# Patient Record
Sex: FEMALE | Race: WHITE | NOT HISPANIC OR LATINO | Employment: UNEMPLOYED | URBAN - METROPOLITAN AREA
[De-identification: names, ages, dates, MRNs, and addresses within clinical notes are randomized per-mention and may not be internally consistent; named-entity substitution may affect disease eponyms.]

---

## 2017-01-10 ENCOUNTER — HOSPITAL ENCOUNTER (OUTPATIENT)
Dept: ULTRASOUND IMAGING | Facility: HOSPITAL | Age: 42
Discharge: HOME | End: 2017-01-10
Attending: UROLOGY
Payer: COMMERCIAL

## 2017-01-10 DIAGNOSIS — N13.30 HYDRONEPHROSIS: ICD-10-CM

## 2017-01-10 PROCEDURE — 76770 US EXAM ABDO BACK WALL COMP: CPT

## 2017-02-16 ENCOUNTER — ALLSCRIPTS OFFICE VISIT (OUTPATIENT)
Dept: OTHER | Facility: OTHER | Age: 42
End: 2017-02-16

## 2018-01-13 VITALS
SYSTOLIC BLOOD PRESSURE: 112 MMHG | HEART RATE: 68 BPM | DIASTOLIC BLOOD PRESSURE: 72 MMHG | HEIGHT: 64 IN | BODY MASS INDEX: 28.17 KG/M2 | WEIGHT: 165 LBS

## 2018-02-04 ENCOUNTER — APPOINTMENT (EMERGENCY)
Dept: RADIOLOGY | Facility: HOSPITAL | Age: 43
End: 2018-02-04
Payer: COMMERCIAL

## 2018-02-04 ENCOUNTER — APPOINTMENT (EMERGENCY)
Dept: CT IMAGING | Facility: HOSPITAL | Age: 43
End: 2018-02-04
Payer: COMMERCIAL

## 2018-02-04 ENCOUNTER — HOSPITAL ENCOUNTER (EMERGENCY)
Facility: HOSPITAL | Age: 43
Discharge: HOME | End: 2018-02-05
Attending: EMERGENCY MEDICINE | Admitting: EMERGENCY MEDICINE
Payer: COMMERCIAL

## 2018-02-04 DIAGNOSIS — R10.11 RUQ ABDOMINAL PAIN: Primary | ICD-10-CM

## 2018-02-04 DIAGNOSIS — R07.89 CHEST WALL PAIN: ICD-10-CM

## 2018-02-04 LAB
ALBUMIN SERPL BCP-MCNC: 3.5 G/DL (ref 3.5–5)
ALP SERPL-CCNC: 50 U/L (ref 46–116)
ALT SERPL W P-5'-P-CCNC: 21 U/L (ref 12–78)
ANION GAP SERPL CALCULATED.3IONS-SCNC: 9 MMOL/L (ref 4–13)
APTT PPP: 29 SECONDS (ref 23–35)
AST SERPL W P-5'-P-CCNC: 20 U/L (ref 5–45)
B-HCG SERPL-ACNC: <2 MIU/ML
BASOPHILS # BLD AUTO: 0.03 THOUSANDS/ΜL (ref 0–0.1)
BASOPHILS NFR BLD AUTO: 0 % (ref 0–1)
BILIRUB SERPL-MCNC: 0.1 MG/DL (ref 0.2–1)
BUN SERPL-MCNC: 9 MG/DL (ref 5–25)
CALCIUM SERPL-MCNC: 9.4 MG/DL (ref 8.3–10.1)
CHLORIDE SERPL-SCNC: 104 MMOL/L (ref 100–108)
CK SERPL-CCNC: 83 U/L (ref 26–192)
CO2 SERPL-SCNC: 28 MMOL/L (ref 21–32)
CREAT SERPL-MCNC: 0.84 MG/DL (ref 0.6–1.3)
DEPRECATED D DIMER PPP: <270 NG/ML (FEU) (ref 0–424)
EOSINOPHIL # BLD AUTO: 0.2 THOUSAND/ΜL (ref 0–0.61)
EOSINOPHIL NFR BLD AUTO: 2 % (ref 0–6)
ERYTHROCYTE [DISTWIDTH] IN BLOOD BY AUTOMATED COUNT: 14.7 % (ref 11.6–15.1)
GFR SERPL CREATININE-BSD FRML MDRD: 87 ML/MIN/1.73SQ M
GLUCOSE SERPL-MCNC: 103 MG/DL (ref 65–140)
HCT VFR BLD AUTO: 35.8 % (ref 34.8–46.1)
HGB BLD-MCNC: 11.7 G/DL (ref 11.5–15.4)
INR PPP: 0.92 (ref 0.86–1.16)
LACTATE SERPL-SCNC: 1.8 MMOL/L (ref 0.5–2)
LIPASE SERPL-CCNC: 214 U/L (ref 73–393)
LYMPHOCYTES # BLD AUTO: 2.71 THOUSANDS/ΜL (ref 0.6–4.47)
LYMPHOCYTES NFR BLD AUTO: 33 % (ref 14–44)
MCH RBC QN AUTO: 27.1 PG (ref 26.8–34.3)
MCHC RBC AUTO-ENTMCNC: 32.7 G/DL (ref 31.4–37.4)
MCV RBC AUTO: 83 FL (ref 82–98)
MONOCYTES # BLD AUTO: 0.94 THOUSAND/ΜL (ref 0.17–1.22)
MONOCYTES NFR BLD AUTO: 11 % (ref 4–12)
NEUTROPHILS # BLD AUTO: 4.38 THOUSANDS/ΜL (ref 1.85–7.62)
NEUTS SEG NFR BLD AUTO: 54 % (ref 43–75)
PLATELET # BLD AUTO: 275 THOUSANDS/UL (ref 149–390)
PMV BLD AUTO: 10.6 FL (ref 8.9–12.7)
POTASSIUM SERPL-SCNC: 4.1 MMOL/L (ref 3.5–5.3)
PROT SERPL-MCNC: 7.4 G/DL (ref 6.4–8.2)
PROTHROMBIN TIME: 12.6 SECONDS (ref 12.1–14.4)
RBC # BLD AUTO: 4.31 MILLION/UL (ref 3.81–5.12)
SODIUM SERPL-SCNC: 141 MMOL/L (ref 136–145)
TROPONIN I SERPL-MCNC: <0.02 NG/ML
WBC # BLD AUTO: 8.26 THOUSAND/UL (ref 4.31–10.16)

## 2018-02-04 PROCEDURE — 85610 PROTHROMBIN TIME: CPT | Performed by: EMERGENCY MEDICINE

## 2018-02-04 PROCEDURE — 71275 CT ANGIOGRAPHY CHEST: CPT

## 2018-02-04 PROCEDURE — 83605 ASSAY OF LACTIC ACID: CPT | Performed by: EMERGENCY MEDICINE

## 2018-02-04 PROCEDURE — 71046 X-RAY EXAM CHEST 2 VIEWS: CPT

## 2018-02-04 PROCEDURE — 83690 ASSAY OF LIPASE: CPT | Performed by: EMERGENCY MEDICINE

## 2018-02-04 PROCEDURE — 85025 COMPLETE CBC W/AUTO DIFF WBC: CPT | Performed by: EMERGENCY MEDICINE

## 2018-02-04 PROCEDURE — 96360 HYDRATION IV INFUSION INIT: CPT

## 2018-02-04 PROCEDURE — 84484 ASSAY OF TROPONIN QUANT: CPT | Performed by: EMERGENCY MEDICINE

## 2018-02-04 PROCEDURE — 80053 COMPREHEN METABOLIC PANEL: CPT | Performed by: EMERGENCY MEDICINE

## 2018-02-04 PROCEDURE — 96361 HYDRATE IV INFUSION ADD-ON: CPT

## 2018-02-04 PROCEDURE — 82550 ASSAY OF CK (CPK): CPT | Performed by: EMERGENCY MEDICINE

## 2018-02-04 PROCEDURE — 85379 FIBRIN DEGRADATION QUANT: CPT | Performed by: EMERGENCY MEDICINE

## 2018-02-04 PROCEDURE — 36415 COLL VENOUS BLD VENIPUNCTURE: CPT

## 2018-02-04 PROCEDURE — 93005 ELECTROCARDIOGRAM TRACING: CPT

## 2018-02-04 PROCEDURE — 85730 THROMBOPLASTIN TIME PARTIAL: CPT | Performed by: EMERGENCY MEDICINE

## 2018-02-04 PROCEDURE — 84702 CHORIONIC GONADOTROPIN TEST: CPT | Performed by: EMERGENCY MEDICINE

## 2018-02-04 PROCEDURE — 74177 CT ABD & PELVIS W/CONTRAST: CPT

## 2018-02-04 RX ORDER — SODIUM CHLORIDE 9 MG/ML
125 INJECTION, SOLUTION INTRAVENOUS CONTINUOUS
Status: DISCONTINUED | OUTPATIENT
Start: 2018-02-04 | End: 2018-02-05 | Stop reason: HOSPADM

## 2018-02-04 RX ADMIN — SODIUM CHLORIDE 1000 ML: 0.9 INJECTION, SOLUTION INTRAVENOUS at 22:59

## 2018-02-04 RX ADMIN — IOHEXOL 100 ML: 350 INJECTION, SOLUTION INTRAVENOUS at 22:58

## 2018-02-05 VITALS
HEART RATE: 76 BPM | BODY MASS INDEX: 29.1 KG/M2 | SYSTOLIC BLOOD PRESSURE: 123 MMHG | TEMPERATURE: 97.9 F | OXYGEN SATURATION: 100 % | DIASTOLIC BLOOD PRESSURE: 72 MMHG | RESPIRATION RATE: 16 BRPM | WEIGHT: 169.53 LBS

## 2018-02-05 LAB
ATRIAL RATE: 78 BPM
CLARITY, POC: CLEAR
COLOR, POC: CLEAR
EXT BILIRUBIN, UA: NEGATIVE
EXT BLOOD URINE: NEGATIVE
EXT GLUCOSE, UA: NEGATIVE
EXT KETONES: NEGATIVE
EXT NITRITE, UA: NEGATIVE
EXT PH, UA: 7.5
EXT PROTEIN, UA: NEGATIVE
EXT SPECIFIC GRAVITY, UA: 1
EXT UROBILINOGEN: 0.2
P AXIS: 50 DEGREES
PR INTERVAL: 136 MS
QRS AXIS: 53 DEGREES
QRSD INTERVAL: 78 MS
QT INTERVAL: 382 MS
QTC INTERVAL: 435 MS
T WAVE AXIS: 16 DEGREES
VENTRICULAR RATE: 78 BPM
WBC # BLD EST: NEGATIVE 10*3/UL

## 2018-02-05 PROCEDURE — 81002 URINALYSIS NONAUTO W/O SCOPE: CPT | Performed by: EMERGENCY MEDICINE

## 2018-02-05 PROCEDURE — 99284 EMERGENCY DEPT VISIT MOD MDM: CPT

## 2018-02-05 PROCEDURE — 93010 ELECTROCARDIOGRAM REPORT: CPT | Performed by: INTERNAL MEDICINE

## 2018-02-05 RX ORDER — OMEPRAZOLE 20 MG/1
20 CAPSULE, DELAYED RELEASE ORAL DAILY
Qty: 20 CAPSULE | Refills: 0 | Status: SHIPPED | OUTPATIENT
Start: 2018-02-05 | End: 2020-12-26 | Stop reason: ALTCHOICE

## 2018-02-05 RX ORDER — NAPROXEN SODIUM 550 MG/1
550 TABLET ORAL 2 TIMES DAILY WITH MEALS
Qty: 20 TABLET | Refills: 0 | Status: SHIPPED | OUTPATIENT
Start: 2018-02-05 | End: 2020-12-26 | Stop reason: ALTCHOICE

## 2018-02-05 NOTE — DISCHARGE INSTRUCTIONS
Abdominal Pain   WHAT YOU NEED TO KNOW:   Abdominal pain can be dull, achy, or sharp  You may have pain in one area of your abdomen, or in your entire abdomen  Your pain may be caused by a condition such as constipation, food sensitivity or poisoning, infection, or a blockage  Abdominal pain can also be from a hernia, appendicitis, or an ulcer  Liver, gallbladder, or kidney conditions can also cause abdominal pain  The cause of your abdominal pain may be unknown  DISCHARGE INSTRUCTIONS:   Return to the emergency department if:   · You have new chest pain or shortness of breath  · You have pulsing pain in your upper abdomen or lower back that suddenly becomes constant  · Your pain is in the right lower abdominal area and worsens with movement  · You have a fever over 100 4°F (38°C) or shaking chills  · You are vomiting and cannot keep food or liquids down  · Your pain does not improve or gets worse over the next 8 to 12 hours  · You see blood in your vomit or bowel movements, or they look black and tarry  · Your skin or the whites of your eyes turn yellow  · You are a woman and have a large amount of vaginal bleeding that is not your monthly period  Contact your healthcare provider if:   · You have pain in your lower back  · You are a man and have pain in your testicles  · You have pain when you urinate  · You have questions or concerns about your condition or care  Follow up with your healthcare provider within 24 hours or as directed:  Write down your questions so you remember to ask them during your visits  Medicines:   · Medicines  may be given to calm your stomach and prevent vomiting or to decrease pain  Ask how to take pain medicine safely  · Take your medicine as directed  Contact your healthcare provider if you think your medicine is not helping or if you have side effects  Tell him of her if you are allergic to any medicine   Keep a list of the medicines, vitamins, and herbs you take  Include the amounts, and when and why you take them  Bring the list or the pill bottles to follow-up visits  Carry your medicine list with you in case of an emergency  © 2017 [address]  Information is for End User's use only and may not be sold, redistributed or otherwise used for commercial purposes  All illustrations and images included in CareNotes® are the copyrighted property of [employer]  or [employer]  The above information is an  only  It is not intended as medical advice for individual conditions or treatments  Talk to your doctor, nurse or pharmacist before following any medical regimen to see if it is safe and effective for you  Chest Wall Pain   WHAT YOU NEED TO KNOW:   Chest wall pain may be caused by problems with the muscles, cartilage, or bones of the chest wall  Chest wall pain may also be caused by pain that spreads to your chest from another part of your body  The pain may be aching, severe, dull, or sharp  It may come and go, or it may be constant  The pain may be worse when you move in certain ways, breathe deeply, or cough  DISCHARGE INSTRUCTIONS:   Call 911 if:   · You have any of the following signs of a heart attack:      ¨ Squeezing, pressure, or pain in your chest that lasts longer than 5 minutes or returns    ¨ Discomfort or pain in your back, neck, jaw, stomach, or arm     ¨ Trouble breathing    ¨ Nausea or vomiting    ¨ Lightheadedness or a sudden cold sweat, especially with chest pain or trouble breathing    Return to the emergency department if:   · You have severe pain  Contact your healthcare provider if:   · You develop a rash  · You have other new symptoms  · Your pain does not improve, even with treatment  · You have questions or concerns about your condition or care  Medicines:   You may need any of the following:  · NSAIDs , such as ibuprofen, help decrease swelling, pain, and fever  This medicine is available with or without a doctor's order  NSAIDs can cause stomach bleeding or kidney problems in certain people  If you take blood thinner medicine, always ask your healthcare provider if NSAIDs are safe for you  Always read the medicine label and follow directions  · Acetaminophen  decreases pain  It is available without a doctor's order  Ask how much to take and how often to take it  Follow directions  Acetaminophen can cause liver damage if not taken correctly  · A cream  may be applied to your chest to decrease pain  · Take your medicine as directed  Contact your healthcare provider if you think your medicine is not helping or if you have side effects  Tell him of her if you are allergic to any medicine  Keep a list of the medicines, vitamins, and herbs you take  Include the amounts, and when and why you take them  Bring the list or the pill bottles to follow-up visits  Carry your medicine list with you in case of an emergency  Follow up with your healthcare provider as directed:  Write down your questions so you remember to ask them during your visits  Self-care:   · Rest  as needed  Avoid activities that make your chest wall pain worse  · Apply heat  on your chest for 20 to 30 minutes every 2 hours for as many days as directed  Heat helps decrease pain and muscle spasms  · Apply ice  on your chest for 15 to 20 minutes every hour or as directed  Use an ice pack, or put crushed ice in a plastic bag  Cover it with a towel  Ice helps prevent tissue damage and decreases swelling and pain  © 2017 [address] [employer]  Information is for End User's use only and may not be sold, redistributed or otherwise used for commercial purposes  All illustrations and images included in CareNotes® are the copyrighted property of [employer]  or [address]  The above information is an  only   It is not intended as medical advice for individual conditions or treatments  Talk to your doctor, nurse or pharmacist before following any medical regimen to see if it is safe and effective for you

## 2018-02-05 NOTE — ED PROVIDER NOTES
History  Chief Complaint   Patient presents with    Abdominal Pain     Pt presents to ED from home w/ abd pain URQ for two days  Pt (+) constipation, but had BM today  Pt (+) hx IBS  Patient is a 40year old female with 2 days of RUQ abdominal pain and interscapular back pain  (+) pleuritic pain  No sob  No cough  No fever  No N/V/D  No GI bleeding  No urinary sx  Has had prior TL and laparoscopic endometriosis surgery in the past  States she drove here but daughter can drive home  Declines pain medication  Had sinus infection 1 week ago  Was last seen in this ED on 12/8/16 for right flank pain  [facility] website checked on this patient and no Rx found  (+) constipation  History provided by:  Patient and relative (daughter)   used: No        Prior to Admission Medications   Prescriptions Last Dose Informant Patient Reported? Taking? CHLOROPHYLL PO   Yes No   Sig: Take 1 Dose by mouth daily   LYSINE PO   Yes No   Sig: Take 1 tablet by mouth daily   Spirulina 500 MG TABS   Yes No   Sig: Take 1 tablet by mouth daily   UNKNOWN TO PATIENT   Yes No   UNKNOWN TO PATIENT   Yes No   UNKNOWN TO PATIENT   Yes No   UNKNOWN TO PATIENT   Yes No   b complex vitamins tablet   Yes No   Sig: Take 1 tablet by mouth daily   ibuprofen (MOTRIN) 600 mg tablet   No No   Sig: Take 1 tablet by mouth every 8 (eight) hours as needed for mild pain or moderate pain (with food) for up to 7 days   multivitamin (THERAGRAN) TABS   Yes No   Sig: Take 1 tablet by mouth daily      Facility-Administered Medications: None       Past Medical History:   Diagnosis Date    Asthma     ACTIVITY INDUCED ASTHMA    IBS (irritable bowel syndrome)        Past Surgical History:   Procedure Laterality Date    ABDOMINAL SURGERY      LAPROSCOPY FOR ENDOMETRIOSIS    CERVICAL BIOPSY  W/ LOOP ELECTRODE EXCISION      FINGER SURGERY      LEFT MIDDLE FINGER SURGERY 5 YRS AGO    TUBAL LIGATION         History reviewed   No pertinent family history  I have reviewed and agree with the history as documented  Social History   Substance Use Topics    Smoking status: Former Smoker    Smokeless tobacco: Never Used      Comment: NO SMOKING IN 11 YEARS    Alcohol use No        Review of Systems   Constitutional: Negative for fever  Respiratory: Negative for cough and shortness of breath  Cardiovascular: Negative for chest pain  Gastrointestinal: Positive for abdominal pain and constipation  Negative for diarrhea, nausea and vomiting  Genitourinary: Negative for difficulty urinating  Musculoskeletal: Positive for back pain  All other systems reviewed and are negative  Physical Exam  ED Triage Vitals [02/04/18 2114]   Temperature Pulse Respirations Blood Pressure SpO2   97 9 °F (36 6 °C) 74 16 130/83 100 %      Temp Source Heart Rate Source Patient Position - Orthostatic VS BP Location FiO2 (%)   Oral Monitor Lying Right arm --      Pain Score       6           Orthostatic Vital Signs  Vitals:    02/04/18 2114   BP: 130/83   Pulse: 74   Patient Position - Orthostatic VS: Lying       Physical Exam   Constitutional: She is oriented to person, place, and time  She appears well-developed and well-nourished  She appears distressed (mild)  HENT:   Head: Normocephalic and atraumatic  Mouth/Throat: Oropharynx is clear and moist    Eyes: No scleral icterus  Neck: No tracheal deviation present  Cardiovascular: Normal rate, regular rhythm and normal heart sounds  No murmur heard  Pulmonary/Chest: Effort normal and breath sounds normal  No stridor  No respiratory distress  She has no wheezes  She has no rales  She exhibits tenderness (R lower lateral chest wall tenderness)  Abdominal: Soft  Bowel sounds are normal  She exhibits no distension  There is tenderness (RUQ)  There is no rebound and no guarding  Musculoskeletal: She exhibits no edema, tenderness (No back tenderness) or deformity     Neurological: She is alert and oriented to person, place, and time  Skin: Skin is warm and dry  No rash noted  Psychiatric: She has a normal mood and affect  Nursing note and vitals reviewed  ED Medications  Medications   sodium chloride 0 9 % infusion (not administered)   sodium chloride 0 9 % bolus 1,000 mL (1,000 mL Intravenous New Bag 2/4/18 2259)   iohexol (OMNIPAQUE) 350 MG/ML injection (MULTI-DOSE) 100 mL (100 mL Intravenous Given 2/4/18 2258)       Diagnostic Studies  Results Reviewed     Procedure Component Value Units Date/Time    POCT urinalysis dipstick [[id number]]  (Normal) Resulted:  02/05/18 0012    Lab Status:  Final result Specimen:  Urine Updated:  02/05/18 0012     Color, UA clear     Clarity, UA clear     EXT Glucose, UA negative     EXT Bilirubin, UA (Ref: Negative) negative     EXT Ketones, UA (Ref: Negative) negative     EXT Spec Grav, UA 1 005     EXT Blood, UA (Ref: Negative) negative     EXT pH, UA 7 5     EXT Protein, UA (Ref: Negative) negative     EXT Urobilinogen, UA (Ref: 0 2- 1 0) 0 2     EXT Leukocytes, UA (Ref: Negative) negative     EXT Nitrite, UA (Ref: Negative) negative    Lactic acid, plasma [[id number]]  (Normal) Collected:  02/04/18 2258    Lab Status:  Final result Specimen:  Blood from Arm, Right Updated:  02/04/18 2327     LACTIC ACID 1 8 mmol/L     Narrative:         Result may be elevated if tourniquet was used during collection  Troponin I [[id number]]  (Normal) Collected:  02/04/18 2258    Lab Status:  Final result Specimen:  Blood from Arm, Right Updated:  02/04/18 2326     Troponin I <0 02 ng/mL     Narrative:         [employer] Chemistry analyzer 99% cutoff is > 0 04 ng/mL in network labs    o cTnI 99% cutoff is useful only when applied to patients in the clinical setting of myocardial ischemia  o cTnI 99% cutoff should be interpreted in the context of clinical history, ECG findings and possibly cardiac imaging to establish correct diagnosis    o cTnI 99% cutoff may be suggestive but clearly not indicative of a coronary event without the clinical setting of myocardial ischemia  D-Dimer [[id number]]  (Normal) Collected:  02/04/18 2258    Lab Status:  Final result Specimen:  Blood from Arm, Right Updated:  02/04/18 2323     D-Dimer, Quant <270 ng/ml (FEU)     Protime-INR [[id number]]  (Normal) Collected:  02/04/18 2258    Lab Status:  Final result Specimen:  Blood from Arm, Right Updated:  02/04/18 2319     Protime 12 6 seconds      INR 0 92    APTT [[id number]]  (Normal) Collected:  02/04/18 2258    Lab Status:  Final result Specimen:  Blood from Arm, Right Updated:  02/04/18 2319     PTT 29 seconds     Narrative:          Therapeutic Heparin Range = 60-90 seconds    Quantitative hCG [[id number]]  (Normal) Collected:  02/04/18 2156    Lab Status:  Final result Specimen:  Blood from Arm, Right Updated:  02/04/18 2231     HCG, Quant <2 mIU/mL     Narrative:          Expected Ranges:     Approximate               Approximate HCG  Gestation age          Concentration ( mIU/mL)  _____________          ______________________   Reeda Ponto                      HCG values  0 2-1                       5-50  1-2                           2-3                         100-5000  3-4                         500-71929  4-5                         1000-61636  5-6                         [id number]  6-8                         [id number]  8-12                        [id number]    Lipase [[id number]]  (Normal) Collected:  02/04/18 2156    Lab Status:  Final result Specimen:  Blood from Arm, Right Updated:  02/04/18 2231     Lipase 214 u/L     CK Total with Reflex CKMB [[id number]]  (Normal) Collected:  02/04/18 2156    Lab Status:  Final result Specimen:  Blood from Arm, Right Updated:  02/04/18 2231     Total CK 83 U/L     Comprehensive metabolic panel [[id number]]  (Abnormal) Collected:  02/04/18 2156    Lab Status:  Final result Specimen:  Blood from Arm, Right Updated:  02/04/18 2223     Sodium 141 mmol/L Potassium 4 1 mmol/L      Chloride 104 mmol/L      CO2 28 mmol/L      Anion Gap 9 mmol/L      BUN 9 mg/dL      Creatinine 0 84 mg/dL      Glucose 103 mg/dL      Calcium 9 4 mg/dL      AST 20 U/L      ALT 21 U/L      Alkaline Phosphatase 50 U/L      Total Protein 7 4 g/dL      Albumin 3 5 g/dL      Total Bilirubin 0 10 (L) mg/dL      eGFR 87 ml/min/1 73sq m     Narrative:         National Kidney Disease Education Program recommendations are as follows:  GFR calculation is accurate only with a steady state creatinine  Chronic Kidney disease less than 60 ml/min/1 73 sq  meters  Kidney failure less than 15 ml/min/1 73 sq  meters  CBC and differential [[id number]]  (Normal) Collected:  02/04/18 2156    Lab Status:  Final result Specimen:  Blood from Arm, Right Updated:  02/04/18 2209     WBC 8 26 Thousand/uL      RBC 4 31 Million/uL      Hemoglobin 11 7 g/dL      Hematocrit 35 8 %      MCV 83 fL      MCH 27 1 pg      MCHC 32 7 g/dL      RDW 14 7 %      MPV 10 6 fL      Platelets 994 Thousands/uL      Neutrophils Relative 54 %      Lymphocytes Relative 33 %      Monocytes Relative 11 %      Eosinophils Relative 2 %      Basophils Relative 0 %      Neutrophils Absolute 4 38 Thousands/µL      Lymphocytes Absolute 2 71 Thousands/µL      Monocytes Absolute 0 94 Thousand/µL      Eosinophils Absolute 0 20 Thousand/µL      Basophils Absolute 0 03 Thousands/µL                  PE Study with CT Abdomen and Pelvis with contrast   ED Interpretation by Julieta Montesinos MD (02/05 0005)   COMPARISON:   No relevant prior studies available  FINDINGS:   Pulmonary arteries: No filling defects identified within the pulmonary arteries to suggest pulmonary   embolism  Aorta: No acute findings  No thoracic aortic aneurysm  Lungs: No acute findings visualized  No mass  No consolidative infiltrate  Pleural space: No significant effusion  No pneumothorax  Heart: No significant cardiomegaly  No pericardial effusion     Bones/joints: No acute fracture  No dislocation  Soft tissues: Unremarkable  Lymph nodes: No significant lymph node enlargement  IMPRESSION:   No evidence of pulmonary embolism or other acute findings in the chest    COMPARISON:   CT RENAL STONE STUDY ABD-PELVIS WO CONTRAST 2016-12-08 16:00   FINDINGS:   Lower thorax: No acute findings  ABDOMEN:   Liver: Unremarkable  No obvious mass  Gallbladder and bile ducts: The gallbladder is contracted  No calcified gallstones visualized  No   significant biliary ductal dilatation  Pancreas: Unremarkable  No ductal dilation  No obvious mass  Spleen: Unremarkable  No splenomegaly  Adrenals: Unremarkable  No adrenal nodules or masses identified  Kidneys and ureters: Unremarkable  No solid mass  No hydronephrosis  Stomach and bowel: No evidence of bowel obstruction  No significant bowel wall thickening   appreciated  Appendix: The appendix is normal in caliber  No evidence of appendicitis  PELVIS:   Bladder: Unremarkable  No obvious mass  Reproductive: 1 2 cm left ovarian cyst or follicle  ABDOMEN and PELVIS:   Intraperitoneal space: Unremarkable  No free air  No significant fluid collection  Bones/joints: Grade I spondylolisthesis at L5/S1  No acute fractures  No dislocation  Soft tissues: No significant abnormalities in the superficial soft tissues  Vasculature: Unremarkable  No abdominal aortic aneurysm  Lymph nodes: No significant lymph node enlargement  IMPRESSION:   No acute findings visualized in the abdomen or pelvis  Thank you for allowing us to participate in the care of your patient  Dictated and Authenticated by: Gale Verdugo MD   02/04/2018 11:24 PM [place] Time ([id number])      XR chest 2 views   ED Interpretation by Ozzie Herrera MD (02/04 0689)   No acute disease read by me                    Procedures  ECG 12 Lead Documentation  Date/Time: 2/4/2018 11:14 PM  Performed by: Ava Ott  Authorized by: Ava Ott Indications / Diagnosis:  Upper back pain, abdominal pain  ECG reviewed by me, the ED Provider: yes    Patient location:  ED  Previous ECG:     Previous ECG:  Compared to current    Comparison ECG info:  4/25/12    Similarity:  No change  Quality:     Tracing quality:  Limited by artifact  Rate:     ECG rate:  78    ECG rate assessment: normal    Rhythm:     Rhythm: sinus rhythm    Ectopy:     Ectopy: none    QRS:     QRS axis:  Normal  Conduction:     Conduction: normal    ST segments:     ST segments:  Normal  T waves:     T waves: non-specific             Phone Contacts  ED Phone Contact    ED Course  ED Course as of Feb 05 0019   Mon Feb 05, 2018   0013 No acute abdomen prior to discharge  MDM  Number of Diagnoses or Management Options  Diagnosis management comments: DDx including but not limited to: appendicitis, gastroenteritis, gastritis, PUD, GERD, gastroparesis, hepatitis, pancreatitis, colitis, enteritis, diverticulitis, food poisoning, mesenteric adenitis, mesenteric ischemia, IBD, IBS, ileus, bowel obstruction, volvulus, internal hernia, AAA, cholecystitis, biliary colic, choledocholithiasis, pelvic pathology, renal colic, pyelonephritis, UTI, PE, PTX, pneumonia; doubt dissection or ACS or MI          Amount and/or Complexity of Data Reviewed  Clinical lab tests: ordered and reviewed  Tests in the radiology section of CPT®: ordered and reviewed  Decide to obtain previous medical records or to obtain history from someone other than the patient: yes  Review and summarize past medical records: yes  Independent visualization of images, tracings, or specimens: yes      CritCare Time    Disposition  Final diagnoses:   RUQ abdominal pain   Chest wall pain     Time reflects when diagnosis was documented in both MDM as applicable and the Disposition within this note     Time User Action Codes Description Comment    2/5/2018 12:18 AM Leatha Rogel Add [R10 11] RUQ abdominal pain     2/5/2018 12:18 AM Doris Houser [R07 89] Chest wall pain       ED Disposition     ED Disposition Condition Comment    Discharge  [facility] discharge to home/self care  Condition at discharge: Stable        Follow-up Information     Follow up With Specialties Details Why Contact Info    info link  Call in 2 days Return sooner if increased pain, fever, vomiting, diarrhea, difficulty breathing, weakness, difficulty urinating  [phone]          Patient's Medications   Discharge Prescriptions    NAPROXEN SODIUM (ANAPROX) 550 MG TABLET    Take 1 tablet (550 mg total) by mouth 2 (two) times a day with meals for 10 days       Start Date: 2/5/2018  End Date: 2/15/2018       Order Dose: 550 mg       Quantity: 20 tablet    Refills: 0    OMEPRAZOLE (PRILOSEC) 20 MG DELAYED RELEASE CAPSULE    Take 1 capsule (20 mg total) by mouth daily for 20 days       Start Date: 2/5/2018  End Date: 2/25/2018       Order Dose: 20 mg       Quantity: 20 capsule    Refills: 0     No discharge procedures on file      ED Provider  Electronically Signed by           Otilio Moscoso MD  02/05/18 44

## 2018-02-12 ENCOUNTER — TELEPHONE (OUTPATIENT)
Dept: UROLOGY | Facility: AMBULATORY SURGERY CENTER | Age: 43
End: 2018-02-12

## 2018-02-12 NOTE — TELEPHONE ENCOUNTER
Pt called stating she had been in ER recently and has already scheduled f/u with Urology  She wanted to know if any testing would be needed based on ER visit  LMOM stating nothing else would be required for f/u  Pt was seen at ED for RUQ abdominal pain

## 2018-02-16 DIAGNOSIS — N13.30 HYDRONEPHROSIS: ICD-10-CM

## 2018-03-05 ENCOUNTER — HOSPITAL ENCOUNTER (EMERGENCY)
Facility: HOSPITAL | Age: 43
Discharge: HOME | End: 2018-03-05
Attending: EMERGENCY MEDICINE
Payer: COMMERCIAL

## 2018-03-05 VITALS
TEMPERATURE: 98.1 F | RESPIRATION RATE: 20 BRPM | BODY MASS INDEX: 27.46 KG/M2 | SYSTOLIC BLOOD PRESSURE: 145 MMHG | WEIGHT: 160 LBS | HEART RATE: 84 BPM | OXYGEN SATURATION: 100 % | DIASTOLIC BLOOD PRESSURE: 63 MMHG

## 2018-03-05 DIAGNOSIS — J40 BRONCHITIS: Primary | ICD-10-CM

## 2018-03-05 PROCEDURE — 99284 EMERGENCY DEPT VISIT MOD MDM: CPT

## 2018-03-05 RX ORDER — ALBUTEROL SULFATE 90 UG/1
2 AEROSOL, METERED RESPIRATORY (INHALATION) ONCE
Status: COMPLETED | OUTPATIENT
Start: 2018-03-05 | End: 2018-03-05

## 2018-03-05 RX ORDER — AZITHROMYCIN 200 MG/5ML
POWDER, FOR SUSPENSION ORAL
Qty: 36.5 ML | Refills: 0 | Status: SHIPPED | OUTPATIENT
Start: 2018-03-05 | End: 2020-12-30 | Stop reason: HOSPADM

## 2018-03-05 RX ORDER — AZITHROMYCIN 250 MG/1
500 TABLET, FILM COATED ORAL ONCE
Status: DISCONTINUED | OUTPATIENT
Start: 2018-03-05 | End: 2018-03-05

## 2018-03-05 RX ORDER — AZITHROMYCIN 200 MG/5ML
500 POWDER, FOR SUSPENSION ORAL ONCE
Status: COMPLETED | OUTPATIENT
Start: 2018-03-05 | End: 2018-03-05

## 2018-03-05 RX ORDER — PREDNISONE 20 MG/1
40 TABLET ORAL DAILY
Qty: 8 TABLET | Refills: 0 | Status: SHIPPED | OUTPATIENT
Start: 2018-03-05 | End: 2018-03-09

## 2018-03-05 RX ORDER — PREDNISONE 20 MG/1
60 TABLET ORAL ONCE
Status: COMPLETED | OUTPATIENT
Start: 2018-03-05 | End: 2018-03-05

## 2018-03-05 RX ADMIN — ALBUTEROL SULFATE 2 PUFF: 90 AEROSOL, METERED RESPIRATORY (INHALATION) at 21:26

## 2018-03-05 RX ADMIN — AZITHROMYCIN 500 MG: 1200 POWDER, FOR SUSPENSION ORAL at 21:26

## 2018-03-05 RX ADMIN — PREDNISONE 60 MG: 20 TABLET ORAL at 21:25

## 2018-03-06 NOTE — DISCHARGE INSTRUCTIONS
Acute Bronchitis   WHAT YOU NEED TO KNOW:   Acute bronchitis is swelling and irritation in the air passages of your lungs  This irritation may cause you to cough or have other breathing problems  Acute bronchitis often starts because of another illness, such as a cold or the flu  The illness spreads from your nose and throat to your windpipe and airways  Bronchitis is often called a chest cold  Acute bronchitis lasts about 3 to 6 weeks and is usually not a serious illness  Your cough can last for several weeks  DISCHARGE INSTRUCTIONS:   Return to the emergency department if:   · You cough up blood  · Your lips or fingernails turn blue  · You feel like you are not getting enough air when you breathe  Contact your healthcare provider if:   · You have a fever  · Your breathing problems do not go away or get worse  · Your cough does not get better within 4 weeks  · You have questions or concerns about your condition or care  Self-care:   · Get more rest   Rest helps your body to heal  Slowly start to do more each day  Rest when you feel it is needed  · Avoid irritants in the air  Avoid chemicals, fumes, and dust  Wear a face mask if you must work around dust or fumes  Stay inside on days when air pollution levels are high  If you have allergies, stay inside when pollen counts are high  Do not use aerosol products, such as spray-on deodorant, bug spray, and hair spray  · Do not smoke or be around others who smoke  Nicotine and other chemicals in cigarettes and cigars damages the cilia that move mucus out of your lungs  Ask your healthcare provider for information if you currently smoke and need help to quit  E-cigarettes or smokeless tobacco still contain nicotine  Talk to your healthcare provider before you use these products  · Drink liquids as directed  Liquids help keep your air passages moist and help you cough up mucus   You may need to drink more liquids when you have acute bronchitis  Ask how much liquid to drink each day and which liquids are best for you  · Use a humidifier or vaporizer  Use a cool mist humidifier or a vaporizer to increase air moisture in your home  This may make it easier for you to breathe and help decrease your cough  Decrease risk for acute bronchitis:   · Get the vaccinations you need  Ask your healthcare provider if you should get vaccinated against the flu or pneumonia  · Prevent the spread of germs  You can decrease your risk of acute bronchitis and other illnesses by doing the following:     [facility] your hands often with soap and water  Carry germ-killing hand lotion or gel with you  You can use the lotion or gel to clean your hands when soap and water are not available  ¨ Do not touch your eyes, nose, or mouth unless you have washed your hands first     ¨ Always cover your mouth when you cough to prevent the spread of germs  It is best to cough into a tissue or your shirt sleeve instead of into your hand  Ask those around you cover their mouths when they cough  ¨ Try to avoid people who have a cold or the flu  If you are sick, stay away from others as much as possible  Medicines: Your healthcare provider may  give you any of the following:  · Ibuprofen or acetaminophen  are medicines that help lower your fever  They are available without a doctor's order  Ask your healthcare provider which medicine is right for you  Ask how much to take and how often to take it  Follow directions  These medicines can cause stomach bleeding if not taken correctly  Ibuprofen can cause kidney damage  Do not take ibuprofen if you have kidney disease, an ulcer, or allergies to aspirin  Acetaminophen can cause liver damage  Do not take more than 4,000 milligrams in 24 hours  · Decongestants  help loosen mucus in your lungs and make it easier to cough up  This can help you breathe easier  · Cough suppressants  decrease your urge to cough   If your cough produces mucus, do not take a cough suppressant unless your healthcare provider tells you to  Your healthcare provider may suggest that you take a cough suppressant at night so you can rest     · Inhalers  may be given  Your healthcare provider may give you one or more inhalers to help you breathe easier and cough less  An inhaler gives your medicine to open your airways  Ask your healthcare provider to show you how to use your inhaler correctly  · Take your medicine as directed  Contact your healthcare provider if you think your medicine is not helping or if you have side effects  Tell him of her if you are allergic to any medicine  Keep a list of the medicines, vitamins, and herbs you take  Include the amounts, and when and why you take them  Bring the list or the pill bottles to follow-up visits  Carry your medicine list with you in case of an emergency  Follow up with your healthcare provider as directed:  Write down questions you have so you will remember to ask them during your follow-up visits  © 2017 [employer] Information is for End User's use only and may not be sold, redistributed or otherwise used for commercial purposes  All illustrations and images included in CareNotes® are the copyrighted property of [employer]  or [employer]  The above information is an  only  It is not intended as medical advice for individual conditions or treatments  Talk to your doctor, nurse or pharmacist before following any medical regimen to see if it is safe and effective for you  Dysphagia   WHAT YOU NEED TO KNOW:   Dysphagia is trouble swallowing  It occurs when you have trouble moving food or liquid from your mouth to your esophagus or down to your stomach  It may occur when you eat, drink, or any time you try to swallow  DISCHARGE INSTRUCTIONS:   Return to the emergency department if:   · You choke on your own saliva  · You have chest pain  · You have shortness of breath  · You cannot eat or drink liquids at all  Contact your healthcare provider if:   · You lose weight without trying  · Your signs and symptoms get worse, or you have new signs or symptoms  · You have signs or symptoms of dehydration, such as increased thirst, dark yellow urine, or little or no urine  · You get colds often  · You have questions or concerns about your condition or care  Nutrition:  You may need to change the texture of the foods you eat to help reduce choking problems  Your healthcare provider may show you how to thicken liquids or soften foods to make them easier to swallow  Follow up with your healthcare provider as directed:  Write down your questions so you remember to ask them during your visits  © 2017 [facility] Information is for End User's use only and may not be sold, redistributed or otherwise used for commercial purposes  All illustrations and images included in CareNotes® are the copyrighted property of A [employer]  or [employer]  The above information is an  only  It is not intended as medical advice for individual conditions or treatments  Talk to your doctor, nurse or pharmacist before following any medical regimen to see if it is safe and effective for you

## 2018-03-06 NOTE — ED PROVIDER NOTES
History  Chief Complaint   Patient presents with    Shortness of Breath     pt reports starting with chest tightness yesterday, pt states she is unable to take a deep breathe + cough and some chest congestion with sore throat, herbal remedies taken at home with no relief, daughter with flu 2 weeks ago     78-year-old female presents to the emergency department with complaints of upper respiratory symptoms  States she has had a cough with sore throat and difficulty taking a deep breath over the past 2-3 days  States she has had a small amount of mucus production with cough  Denies shortness of breath at rest   States that she has been taking over-the-counter medications without relief of symptoms  Also requesting with medication  Patient states that she choked on a pepper several weeks ago and has had difficulty swallowing pills  States that she has been modifying her diet and that she is not having issues swallowing food  She denies pain in her chest or pain with swallowing  States she had a similar instance several years ago and had endoscopy done showing small tear in her soften his which healed without difficulty  Will give follow-up with Gastroenterology          History provided by:  Patient   used: No    Shortness of Breath   Severity:  Mild  Onset quality:  Gradual  Duration:  3 days  Timing:  Constant  Progression:  Waxing and waning  Chronicity:  New  Context: URI    Context: not activity, not animal exposure, not emotional upset, not fumes, not known allergens, not occupational exposure, not pollens, not smoke exposure, not strong odors and not weather changes    Relieved by:  Nothing  Associated symptoms: cough and sore throat    Associated symptoms: no abdominal pain, no chest pain, no claudication, no diaphoresis, no ear pain, no fever, no headaches, no hemoptysis, no neck pain, no PND, no rash, no sputum production, no syncope, no swollen glands, no vomiting and no wheezing        Prior to Admission Medications   Prescriptions Last Dose Informant Patient Reported? Taking? CHLOROPHYLL PO   Yes No   Sig: Take 1 Dose by mouth daily   LYSINE PO   Yes No   Sig: Take 1 tablet by mouth daily   Spirulina 500 MG TABS   Yes No   Sig: Take 1 tablet by mouth daily   UNKNOWN TO PATIENT   Yes No   UNKNOWN TO PATIENT   Yes No   UNKNOWN TO PATIENT   Yes No   UNKNOWN TO PATIENT   Yes No   b complex vitamins tablet   Yes No   Sig: Take 1 tablet by mouth daily   ibuprofen (MOTRIN) 600 mg tablet   No No   Sig: Take 1 tablet by mouth every 8 (eight) hours as needed for mild pain or moderate pain (with food) for up to 7 days   multivitamin (THERAGRAN) TABS   Yes No   Sig: Take 1 tablet by mouth daily   naproxen sodium (ANAPROX) 550 mg tablet   No No   Sig: Take 1 tablet (550 mg total) by mouth 2 (two) times a day with meals for 10 days   omeprazole (PriLOSEC) 20 mg delayed release capsule   No No   Sig: Take 1 capsule (20 mg total) by mouth daily for 20 days      Facility-Administered Medications: None       Past Medical History:   Diagnosis Date    Asthma     ACTIVITY INDUCED ASTHMA    IBS (irritable bowel syndrome)        Past Surgical History:   Procedure Laterality Date    ABDOMINAL SURGERY      LAPROSCOPY FOR ENDOMETRIOSIS    CERVICAL BIOPSY  W/ LOOP ELECTRODE EXCISION      FINGER SURGERY      LEFT MIDDLE FINGER SURGERY 5 YRS AGO    TUBAL LIGATION         History reviewed  No pertinent family history  I have reviewed and agree with the history as documented  Social History   Substance Use Topics    Smoking status: Former Smoker    Smokeless tobacco: Never Used      Comment: NO SMOKING IN 11 YEARS    Alcohol use No        Review of Systems   Constitutional: Negative for activity change, chills, diaphoresis and fever  HENT: Positive for sore throat  Negative for dental problem, drooling, ear pain, mouth sores, sinus pressure, sneezing, tinnitus and trouble swallowing      Eyes: Negative for pain, discharge and itching  Respiratory: Positive for cough and shortness of breath  Negative for hemoptysis, sputum production, chest tightness and wheezing  Cardiovascular: Negative for chest pain, claudication, syncope and PND  Gastrointestinal: Negative for abdominal pain and vomiting  Musculoskeletal: Negative for neck pain  Skin: Negative for rash and wound  Neurological: Negative for dizziness, light-headedness and headaches  All other systems reviewed and are negative  Physical Exam  ED Triage Vitals [03/05/18 2028]   Temperature Pulse Respirations Blood Pressure SpO2   98 1 °F (36 7 °C) 84 20 145/63 100 %      Temp Source Heart Rate Source Patient Position - Orthostatic VS BP Location FiO2 (%)   Oral Monitor Sitting Left arm --      Pain Score       2           Orthostatic Vital Signs  Vitals:    03/05/18 2028   BP: 145/63   Pulse: 84   Patient Position - Orthostatic VS: Sitting       Physical Exam   Constitutional: She is oriented to person, place, and time  Vital signs are normal  She appears well-developed and well-nourished  No distress  HENT:   Head: Normocephalic and atraumatic  Right Ear: Hearing, tympanic membrane, external ear and ear canal normal    Left Ear: Hearing, tympanic membrane, external ear and ear canal normal    Nose: Nose normal    Mouth/Throat: Uvula is midline and oropharynx is clear and moist  No oropharyngeal exudate  Eyes: Conjunctivae, EOM and lids are normal    Cardiovascular: Normal rate and regular rhythm  Exam reveals no gallop and no friction rub  No murmur heard  Pulmonary/Chest: Effort normal and breath sounds normal  No respiratory distress  She has no wheezes  She has no rhonchi  She has no rales  She exhibits no tenderness  Barky breath sounds with cough  Musculoskeletal: Normal range of motion  Neurological: She is alert and oriented to person, place, and time  Skin: She is not diaphoretic     Psychiatric: She has a normal mood and affect  Her behavior is normal    Vitals reviewed  ED Medications  Medications   predniSONE tablet 60 mg (60 mg Oral Given 3/5/18 2125)   albuterol (PROVENTIL HFA,VENTOLIN HFA) inhaler 2 puff (2 puffs Inhalation Given 3/5/18 2126)   azithromycin (ZITHROMAX) oral suspension 500 mg (500 mg Oral Given 3/5/18 2126)       Diagnostic Studies  Results Reviewed     None                 No orders to display              Procedures  Procedures       Phone Contacts  ED Phone Contact    ED Course  ED Course                                MDM  Number of Diagnoses or Management Options  Bronchitis:   Diagnosis management comments: Differential diagnosis includes but not limited to:  Upper respiratory infection, bronchitis, esophageal stricture  CritCare Time    Disposition  Final diagnoses:   Bronchitis     Time reflects when diagnosis was documented in both MDM as applicable and the Disposition within this note     Time User Action Codes Description Comment    3/5/2018  9:22 PM Rama Snyder Add [J40] Bronchitis       ED Disposition     ED Disposition Condition Comment    Discharge  [facility] discharge to home/self care  Condition at discharge: Stable        Follow-up Information     Follow up With Specialties Details Why [id number] [facility] Internal Medicine Schedule an appointment as soon as possible for a visit  [address] [place] [address] [facility] Gastroenterology Schedule an appointment as soon as possible for a visit  Anil Cardenashannahrosalio  [address]   [phone]        Discharge Medication List as of 3/5/2018  9:25 PM      START taking these medications    Details   azithromycin (ZITHROMAX) 200 mg/5 mL suspension 364 mg (9 1 ml) by mouth daily for 4 days  , Print      predniSONE 20 mg tablet Take 2 tablets (40 mg total) by mouth daily for 4 days, Starting Mon 3/5/2018, Until Fri 3/9/2018, Print         CONTINUE these medications which have NOT CHANGED    Details   b complex vitamins tablet Take 1 tablet by mouth daily, Until Discontinued, Historical Med      CHLOROPHYLL PO Take 1 Dose by mouth daily, Until Discontinued, Historical Med      ibuprofen (MOTRIN) 600 mg tablet Take 1 tablet by mouth every 8 (eight) hours as needed for mild pain or moderate pain (with food) for up to 7 days, Starting 12/8/2016, Until u 12/15/16, Print      LYSINE PO Take 1 tablet by mouth daily, Until Discontinued, Historical Med      multivitamin (THERAGRAN) TABS Take 1 tablet by mouth daily, Until Discontinued, Historical Med      naproxen sodium (ANAPROX) 550 mg tablet Take 1 tablet (550 mg total) by mouth 2 (two) times a day with meals for 10 days, Starting Mon 2/5/2018, Until Thu 2/15/2018, Print      omeprazole (PriLOSEC) 20 mg delayed release capsule Take 1 capsule (20 mg total) by mouth daily for 20 days, Starting Mon 2/5/2018, Until Sun 2/25/2018, Print      Spirulina 500 MG TABS Take 1 tablet by mouth daily, Until Discontinued, Historical Med      !! UNKNOWN TO PATIENT Until Discontinued, Historical Med      !! UNKNOWN TO PATIENT Until Discontinued, Historical Med      !! UNKNOWN TO PATIENT Until Discontinued, Historical Med      !! UNKNOWN TO PATIENT Until Discontinued, Historical Med       !! - Potential duplicate medications found  Please discuss with provider  No discharge procedures on file      ED Provider  Electronically Signed by           Jimenez Paredes PA-C  03/06/18 0550

## 2018-03-09 ENCOUNTER — LAB REQUISITION (OUTPATIENT)
Dept: LAB | Facility: HOSPITAL | Age: 43
End: 2018-03-09
Payer: COMMERCIAL

## 2018-03-09 DIAGNOSIS — Z00.00 ENCOUNTER FOR GENERAL ADULT MEDICAL EXAMINATION WITHOUT ABNORMAL FINDINGS: ICD-10-CM

## 2018-03-09 PROCEDURE — 86803 HEPATITIS C AB TEST: CPT | Performed by: PREVENTIVE MEDICINE

## 2018-03-11 LAB — HCV AB SER QL: NORMAL

## 2018-04-21 ENCOUNTER — APPOINTMENT (EMERGENCY)
Dept: RADIOLOGY | Facility: HOSPITAL | Age: 43
End: 2018-04-21
Payer: COMMERCIAL

## 2018-04-21 ENCOUNTER — HOSPITAL ENCOUNTER (EMERGENCY)
Facility: HOSPITAL | Age: 43
Discharge: HOME | End: 2018-04-21
Attending: EMERGENCY MEDICINE | Admitting: EMERGENCY MEDICINE
Payer: COMMERCIAL

## 2018-04-21 VITALS
WEIGHT: 164.46 LBS | RESPIRATION RATE: 20 BRPM | HEART RATE: 65 BPM | BODY MASS INDEX: 28.23 KG/M2 | OXYGEN SATURATION: 99 % | DIASTOLIC BLOOD PRESSURE: 72 MMHG | SYSTOLIC BLOOD PRESSURE: 125 MMHG | TEMPERATURE: 98.1 F

## 2018-04-21 DIAGNOSIS — K22.89 ESOPHAGEAL PAIN: Primary | ICD-10-CM

## 2018-04-21 PROCEDURE — 99284 EMERGENCY DEPT VISIT MOD MDM: CPT

## 2018-04-21 PROCEDURE — 71046 X-RAY EXAM CHEST 2 VIEWS: CPT

## 2018-04-21 RX ORDER — SUCRALFATE ORAL 1 G/10ML
1 SUSPENSION ORAL DAILY
COMMUNITY
End: 2021-01-18

## 2018-04-21 RX ORDER — MAGNESIUM HYDROXIDE/ALUMINUM HYDROXICE/SIMETHICONE 120; 1200; 1200 MG/30ML; MG/30ML; MG/30ML
20 SUSPENSION ORAL ONCE
Status: COMPLETED | OUTPATIENT
Start: 2018-04-21 | End: 2018-04-21

## 2018-04-21 RX ADMIN — LIDOCAINE HYDROCHLORIDE 15 ML: 20 SOLUTION ORAL; TOPICAL at 01:54

## 2018-04-21 RX ADMIN — ALUMINUM HYDROXIDE, MAGNESIUM HYDROXIDE, AND SIMETHICONE 20 ML: 200; 200; 20 SUSPENSION ORAL at 01:55

## 2018-04-21 NOTE — ED PROVIDER NOTES
History  Chief Complaint   Patient presents with    Breathing Difficulty     per pt  "she swallowed some motrin tablets due to some abdominal pain and it got stuck in her throat causing her some breathing difficulty, pt states she swallowed the pill @ 6pm yesterday "     Pt in ER with c/o difficulty breathing  She states that she took a tab of motrin tonight, felt that it was stuck, attempted to dislodge it by drinking water, and admits that although it feels like it is no longer there, she states that she feels as if she can't breathe  Pt with similar symptoms after she choked on a pepper over 1mth ago, and on a piece of cilantro yesterday  She has seen her PCP and is scheduled for an upper GI series on Tues  Pt is currently laying in bed comfortably, speaking in complete sentences and is not in any distress            Prior to Admission Medications   Prescriptions Last Dose Informant Patient Reported? Taking? CHLOROPHYLL PO   Yes No   Sig: Take 1 Dose by mouth daily   LYSINE PO   Yes No   Sig: Take 1 tablet by mouth daily   Spirulina 500 MG TABS   Yes No   Sig: Take 1 tablet by mouth daily   UNKNOWN TO PATIENT   Yes No   UNKNOWN TO PATIENT   Yes No   UNKNOWN TO PATIENT   Yes No   UNKNOWN TO PATIENT   Yes No   azithromycin (ZITHROMAX) 200 mg/5 mL suspension   No No   Si mg (9 1 ml) by mouth daily for 4 days     b complex vitamins tablet   Yes No   Sig: Take 1 tablet by mouth daily   ibuprofen (MOTRIN) 600 mg tablet   No No   Sig: Take 1 tablet by mouth every 8 (eight) hours as needed for mild pain or moderate pain (with food) for up to 7 days   multivitamin (THERAGRAN) TABS   Yes No   Sig: Take 1 tablet by mouth daily   naproxen sodium (ANAPROX) 550 mg tablet   No No   Sig: Take 1 tablet (550 mg total) by mouth 2 (two) times a day with meals for 10 days   omeprazole (PriLOSEC) 20 mg delayed release capsule   No No   Sig: Take 1 capsule (20 mg total) by mouth daily for 20 days   sucralfate (CARAFATE) 1 g/10 mL suspension   Yes Yes   Sig: Take 1 g by mouth daily      Facility-Administered Medications: None       Past Medical History:   Diagnosis Date    Asthma     ACTIVITY INDUCED ASTHMA    IBS (irritable bowel syndrome)        Past Surgical History:   Procedure Laterality Date    ABDOMINAL SURGERY      LAPROSCOPY FOR ENDOMETRIOSIS    CERVICAL BIOPSY  W/ LOOP ELECTRODE EXCISION      FINGER SURGERY      LEFT MIDDLE FINGER SURGERY 5 YRS AGO    TUBAL LIGATION         History reviewed  No pertinent family history  I have reviewed and agree with the history as documented  Social History   Substance Use Topics    Smoking status: Former Smoker    Smokeless tobacco: Never Used      Comment: NO SMOKING IN 11 YEARS    Alcohol use No        Review of Systems   Constitutional: Negative for chills and fever  Respiratory: Positive for choking and shortness of breath  Negative for cough, chest tightness, wheezing and stridor  Cardiovascular: Negative for chest pain  All other systems reviewed and are negative  Physical Exam  ED Triage Vitals [04/21/18 0115]   Temperature Pulse Respirations Blood Pressure SpO2   98 1 °F (36 7 °C) 65 20 125/72 99 %      Temp Source Heart Rate Source Patient Position - Orthostatic VS BP Location FiO2 (%)   Oral Monitor Lying Right arm --      Pain Score       5           Orthostatic Vital Signs  Vitals:    04/21/18 0115   BP: 125/72   Pulse: 65   Patient Position - Orthostatic VS: Lying       Physical Exam   Constitutional: She appears well-developed and well-nourished  No distress  HENT:   Head: Normocephalic and atraumatic  Mouth/Throat: Uvula is midline and mucous membranes are normal  No trismus in the jaw  No uvula swelling  No oropharyngeal exudate, posterior oropharyngeal edema, posterior oropharyngeal erythema or tonsillar abscesses  Eyes: Conjunctivae are normal  Pupils are equal, round, and reactive to light  Neck: Normal range of motion  Neck supple  Cardiovascular: Normal rate, regular rhythm and normal heart sounds  No murmur heard  Pulmonary/Chest: Effort normal and breath sounds normal  No respiratory distress  She has no wheezes  Abdominal: Soft  Bowel sounds are normal  She exhibits no distension  There is no tenderness  Musculoskeletal: Normal range of motion  She exhibits no edema or deformity  Neurological: She is alert  No cranial nerve deficit  Skin: Skin is warm and dry  No rash noted  She is not diaphoretic  No pallor  Psychiatric: Her behavior is normal  Her mood appears anxious  Nursing note and vitals reviewed  ED Medications  Medications   aluminum-magnesium hydroxide-simethicone (MYLANTA) 200-200-20 mg/5 mL oral suspension 20 mL (20 mL Oral Given 4/21/18 0155)   lidocaine viscous (XYLOCAINE) 2 % mucosal solution 15 mL (15 mL Swish & Swallow Given 4/21/18 0154)       Diagnostic Studies  Results Reviewed     None                 XR chest 2 views   ED Interpretation by Jennie Machuca DO (04/21 0234)   nad      Final Result by Marty Layton MD (04/21 1022)      No acute cardiopulmonary disease  Workstation performed: [id number]                    Procedures  Procedures       Phone Contacts  ED Phone Contact    ED Course  ED Course as of Apr 22 0808   Sat Apr 21, 2018   0232 XR chest 2 views                               MDM  Number of Diagnoses or Management Options  Esophageal pain:     CritCare Time    Disposition  Final diagnoses:   Esophageal pain     Time reflects when diagnosis was documented in both MDM as applicable and the Disposition within this note     Time User Action Codes Description Comment    4/21/2018  2:40 AM Lexine Alt Add [K22 8] Esophageal pain       ED Disposition     ED Disposition Condition Comment    Discharge  [facility] discharge to home/self care      Condition at discharge: Stable        Follow-up Information     Follow up With Specialties Details Why Contact Info Schedule an appointment as soon as possible for a visit in 1 day for follow up         Discharge Medication List as of 4/21/2018  2:46 AM      CONTINUE these medications which have NOT CHANGED    Details   sucralfate (CARAFATE) 1 g/10 mL suspension Take 1 g by mouth daily, Historical Med      azithromycin (ZITHROMAX) 200 mg/5 mL suspension 364 mg (9 1 ml) by mouth daily for 4 days  , Print      b complex vitamins tablet Take 1 tablet by mouth daily, Until Discontinued, Historical Med      CHLOROPHYLL PO Take 1 Dose by mouth daily, Until Discontinued, Historical Med      ibuprofen (MOTRIN) 600 mg tablet Take 1 tablet by mouth every 8 (eight) hours as needed for mild pain or moderate pain (with food) for up to 7 days, Starting 12/8/2016, Until u 12/15/16, Print      LYSINE PO Take 1 tablet by mouth daily, Until Discontinued, Historical Med      multivitamin (THERAGRAN) TABS Take 1 tablet by mouth daily, Until Discontinued, Historical Med      naproxen sodium (ANAPROX) 550 mg tablet Take 1 tablet (550 mg total) by mouth 2 (two) times a day with meals for 10 days, Starting Mon 2/5/2018, Until Thu 2/15/2018, Print      omeprazole (PriLOSEC) 20 mg delayed release capsule Take 1 capsule (20 mg total) by mouth daily for 20 days, Starting Mon 2/5/2018, Until Sun 2/25/2018, Print      Spirulina 500 MG TABS Take 1 tablet by mouth daily, Until Discontinued, Historical Med      !! UNKNOWN TO PATIENT Until Discontinued, Historical Med      !! UNKNOWN TO PATIENT Until Discontinued, Historical Med      !! UNKNOWN TO PATIENT Until Discontinued, Historical Med      !! UNKNOWN TO PATIENT Until Discontinued, Historical Med       !! - Potential duplicate medications found  Please discuss with provider  No discharge procedures on file      ED Provider  Electronically Signed by           Lilia Arthur DO  04/22/18 0869

## 2018-04-21 NOTE — DISCHARGE INSTRUCTIONS
Keep your appointment for your upper GI series on Tuesday  Esophagitis   WHAT YOU NEED TO KNOW:   Esophagitis is inflammation or irritation of the lining of the esophagus  DISCHARGE INSTRUCTIONS:   Call 911 for any of the following:   · You have chest pain that does not go away within a few minutes or gets worse  Return to the emergency department if:   · You feel like you have food stuck in your throat and you cannot cough it out  Contact your healthcare provider if:   · You have new or worsening symptoms, even after treatment  · You have questions or concerns about your condition or care  Medicines:   · Medicines  may be given to fight an infection or to control stomach acid  · Take your medicine as directed  Contact your healthcare provider if you think your medicine is not helping or if you have side effects  Tell him of her if you are allergic to any medicine  Keep a list of the medicines, vitamins, and herbs you take  Include the amounts, and when and why you take them  Bring the list or the pill bottles to follow-up visits  Carry your medicine list with you in case of an emergency  Follow up with your healthcare provider as directed: You may need ongoing tests or treatment  Write down your questions so you remember to ask them during your visits  Do not smoke:  Nicotine and other chemicals in cigarettes and cigars can cause blood vessel and lung damage  Ask your healthcare provider for information if you currently smoke and need help to quit  E-cigarettes or smokeless tobacco still contain nicotine  Talk to your healthcare provider before you use these products  Do not drink alcohol:  Alcohol can irritate your esophagus  Talk to your healthcare provider if you need help to stop drinking  Keep batteries and similar objects out of the reach of children:  Babies often put items in their mouths to explore them  Button batteries are easy to swallow and can cause serious damage  Keep the battery covers of electronic devices such as remote controls taped closed  Store all batteries and toxic materials where children cannot get to them  Use childproof locks to keep children away from dangerous materials  Manage or prevent esophagitis:   · Limit or do not eat foods that can lead to esophagitis  Foods such as oranges and salsa can irritate your esophagus  Caffeine and chocolate can cause acid reflux  High-fat and fried foods make your stomach digest food more slowly  This increases the amount of stomach acid your esophagus is exposed to  Eat small meals, and drink water with your meals  Soft foods such as yogurt and applesauce may help soothe your throat  Do not eat for at least 3 hours before you go to bed  · Prevent acid reflux  Do not bend over unless it is necessary  Acid may back up into your esophagus when you bend over  If possible, keep the head of your bed elevated while you sleep  This will help keep acid from backing up  Manage stress  Stress can make your symptoms worse or cause stomach acid to back up  · Drink more liquid when you take pills  Drink a full glass of water when you take your pills  Ask your healthcare provider if you can take your pills at least an hour before you go to bed  © 2017 [employer]  Information is for End User's use only and may not be sold, redistributed or otherwise used for commercial purposes  All illustrations and images included in CareNotes® are the copyrighted property of [employer]  or [employer]  The above information is an  only  It is not intended as medical advice for individual conditions or treatments  Talk to your doctor, nurse or pharmacist before following any medical regimen to see if it is safe and effective for you

## 2020-12-24 ENCOUNTER — HOSPITAL ENCOUNTER (EMERGENCY)
Facility: HOSPITAL | Age: 45
Discharge: HOME | DRG: 046 | End: 2020-12-24
Attending: EMERGENCY MEDICINE | Admitting: EMERGENCY MEDICINE
Payer: COMMERCIAL

## 2020-12-24 VITALS
OXYGEN SATURATION: 98 % | BODY MASS INDEX: 28.32 KG/M2 | WEIGHT: 165 LBS | DIASTOLIC BLOOD PRESSURE: 70 MMHG | TEMPERATURE: 98.4 F | SYSTOLIC BLOOD PRESSURE: 116 MMHG | HEART RATE: 75 BPM | RESPIRATION RATE: 18 BRPM

## 2020-12-24 DIAGNOSIS — G43.909 MIGRAINE: Primary | ICD-10-CM

## 2020-12-24 LAB
BILIRUB UR QL STRIP: NEGATIVE
CLARITY UR: CLEAR
COLOR UR: YELLOW
EXT PREG TEST URINE: NEGATIVE
EXT. CONTROL ED NAV: NORMAL
GLUCOSE UR STRIP-MCNC: NEGATIVE MG/DL
HGB UR QL STRIP.AUTO: NEGATIVE
KETONES UR STRIP-MCNC: ABNORMAL MG/DL
LEUKOCYTE ESTERASE UR QL STRIP: NEGATIVE
NITRITE UR QL STRIP: NEGATIVE
PH UR STRIP.AUTO: 5.5 [PH] (ref 4.5–8)
PROT UR STRIP-MCNC: NEGATIVE MG/DL
SP GR UR STRIP.AUTO: 1.01 (ref 1–1.03)
UROBILINOGEN UR QL STRIP.AUTO: 0.2 E.U./DL

## 2020-12-24 PROCEDURE — 96375 TX/PRO/DX INJ NEW DRUG ADDON: CPT

## 2020-12-24 PROCEDURE — 99284 EMERGENCY DEPT VISIT MOD MDM: CPT | Performed by: PHYSICIAN ASSISTANT

## 2020-12-24 PROCEDURE — 99283 EMERGENCY DEPT VISIT LOW MDM: CPT

## 2020-12-24 PROCEDURE — 81003 URINALYSIS AUTO W/O SCOPE: CPT

## 2020-12-24 PROCEDURE — 96365 THER/PROPH/DIAG IV INF INIT: CPT

## 2020-12-24 PROCEDURE — 81025 URINE PREGNANCY TEST: CPT | Performed by: PHYSICIAN ASSISTANT

## 2020-12-24 RX ORDER — CYCLOBENZAPRINE HCL 10 MG
10 TABLET ORAL ONCE
Status: COMPLETED | OUTPATIENT
Start: 2020-12-24 | End: 2020-12-24

## 2020-12-24 RX ORDER — DEXAMETHASONE SODIUM PHOSPHATE 4 MG/ML
10 INJECTION, SOLUTION INTRA-ARTICULAR; INTRALESIONAL; INTRAMUSCULAR; INTRAVENOUS; SOFT TISSUE ONCE
Status: COMPLETED | OUTPATIENT
Start: 2020-12-24 | End: 2020-12-24

## 2020-12-24 RX ORDER — METOCLOPRAMIDE HYDROCHLORIDE 5 MG/ML
10 INJECTION INTRAMUSCULAR; INTRAVENOUS ONCE
Status: COMPLETED | OUTPATIENT
Start: 2020-12-24 | End: 2020-12-24

## 2020-12-24 RX ORDER — DIPHENHYDRAMINE HYDROCHLORIDE 50 MG/ML
25 INJECTION INTRAMUSCULAR; INTRAVENOUS ONCE
Status: COMPLETED | OUTPATIENT
Start: 2020-12-24 | End: 2020-12-24

## 2020-12-24 RX ORDER — MAGNESIUM SULFATE HEPTAHYDRATE 40 MG/ML
2 INJECTION, SOLUTION INTRAVENOUS ONCE
Status: COMPLETED | OUTPATIENT
Start: 2020-12-24 | End: 2020-12-24

## 2020-12-24 RX ADMIN — CYCLOBENZAPRINE HYDROCHLORIDE 10 MG: 10 TABLET, FILM COATED ORAL at 22:12

## 2020-12-24 RX ADMIN — MAGNESIUM SULFATE HEPTAHYDRATE 2 G: 40 INJECTION, SOLUTION INTRAVENOUS at 22:09

## 2020-12-24 RX ADMIN — SODIUM CHLORIDE 1000 ML: 0.9 INJECTION, SOLUTION INTRAVENOUS at 22:11

## 2020-12-24 RX ADMIN — DEXAMETHASONE SODIUM PHOSPHATE 10 MG: 4 INJECTION, SOLUTION INTRAMUSCULAR; INTRAVENOUS at 21:44

## 2020-12-24 RX ADMIN — DIPHENHYDRAMINE HYDROCHLORIDE 25 MG: 50 INJECTION, SOLUTION INTRAMUSCULAR; INTRAVENOUS at 21:45

## 2020-12-24 RX ADMIN — METOCLOPRAMIDE HYDROCHLORIDE 10 MG: 5 INJECTION INTRAMUSCULAR; INTRAVENOUS at 21:45

## 2020-12-25 NOTE — DISCHARGE INSTRUCTIONS
VESTIBULAR MIGRAINE    A vestibular migraine is a nervous system problem that causes repeated dizziness (or vertigo) in people who have a history of migraine symptoms  Unlike traditional migraines, you may not always have a headache  There are many names for this type of problem  Your doctor might also call it:  Migraine-associated vertigo  Migrainous vertigo  Migraine-related vestibulopathy     What Are the Symptoms? Vestibular migraines dont always cause headaches  The main symptom is dizziness that comes and goes  Vestibular refers to the inner ear, which controls your hearing and balance  If youre having a vestibular migraine, you may feel:  Dizziness that lasts more than a few minutes  Nausea and vomiting  Balance problems  Extreme motion sensitivity -- feeling sick or dizzy when you move your head, eyes, or body  Feeling disoriented or confused  Feeling unsteady, like youre in a rocking boat  Sensitivity to sound    You could get dizzy and have balance problems without having a migraine at all  Other times, the vertigo symptoms happen before, during, or after the headache  Sometimes, you might have migraines for years before the vertigo symptoms begin  What Causes Them? Doctors aren't sure  Like migraines, there are a lot of theories  But how it really happens is poorly understood  They credit it to misfires between nerve cells in your brain  Who Gets Them? It's hard to tell how many people are living with this condition  The symptoms mimic a lot of other diseases  Researchers think they affect about 1% of the population  But that number could be higher  Like traditional migraines, theyre more common in women than men  Vertigo symptoms tend to first strike around age 36  But the condition doesn't just affect adults  Kids can get it, too  How Are Vestibular Migraines Diagnosed? Theres no blood or imaging test that can tell for sure   But the International Headache Society and other organizations recently set up the first criteria to help your doctor diagnose the disorder  You could be having a vestibular migraine if:  You have migraines or had them in the past   You have at least 5 episodes of vertigo that make you feel like you are spinning or moving  This isnt the same as motion sickness or feeling faint  These feelings last between 5 minutes to 72 hours  Your symptoms are moderate to severe  That means they stop you from doing everyday tasks or theyre so bad you can't do anything at all  At least half of the episodes happen with one of the following migraine symptoms:  A headache that has two of these characteristics: is one-sided, pulsing, moderate to severe, or gets worse with activity  Sensitivity to light or sound  Seeing shimmering or flashing lights in your vision (a migraine with aura)  How Are They Treated? Theres no specific medication for vestibular migraines  Your doctor will prescribe different drugs to stop an attack when it happens  This is called abortive therapy  Triptans  Take these migraine meds at the first sign of headache symptoms  Vestibular suppressant  It can ease your dizziness and motion sensitivity  This type of drug works on the balance center in your inner ear  Your doctor might prescribe benzodiazepines like lorazepam (Ativan), anti-nausea drugs like promethazine and antihistamines like meclizine  If you have frequent or disabling vestibular migraines, your doctor may try drugs similar to traditional migraine prevention meds  You take these regularly to reduce the severity or frequency of the headaches  These include seizure medicines, blood pressure medicines (like beta blockers and calcium channel blockers), and some antidepressants   CGRP inhibitors are a new class of preventive medicine that your doctor may recommend if other medicines dont help

## 2020-12-26 ENCOUNTER — HOSPITAL ENCOUNTER (INPATIENT)
Facility: HOSPITAL | Age: 45
LOS: 4 days | Discharge: HOME | DRG: 046 | End: 2020-12-30
Attending: EMERGENCY MEDICINE | Admitting: INTERNAL MEDICINE
Payer: COMMERCIAL

## 2020-12-26 ENCOUNTER — APPOINTMENT (EMERGENCY)
Dept: CT IMAGING | Facility: HOSPITAL | Age: 45
DRG: 046 | End: 2020-12-26
Payer: COMMERCIAL

## 2020-12-26 DIAGNOSIS — I77.71 DISSECTION OF CAROTID ARTERY (HCC): Primary | ICD-10-CM

## 2020-12-26 DIAGNOSIS — I77.71 INTERNAL CAROTID ARTERY DISSECTION (HCC): ICD-10-CM

## 2020-12-26 DIAGNOSIS — R51.9 ACUTE NONINTRACTABLE HEADACHE, UNSPECIFIED HEADACHE TYPE: ICD-10-CM

## 2020-12-26 PROBLEM — D64.9 ANEMIA: Status: ACTIVE | Noted: 2020-12-26

## 2020-12-26 LAB
ALBUMIN SERPL BCP-MCNC: 3.9 G/DL (ref 3.5–5)
ALP SERPL-CCNC: 48 U/L (ref 46–116)
ALT SERPL W P-5'-P-CCNC: 21 U/L (ref 12–78)
ANION GAP SERPL CALCULATED.3IONS-SCNC: 10 MMOL/L (ref 4–13)
APTT PPP: 25 SECONDS (ref 23–37)
AST SERPL W P-5'-P-CCNC: 14 U/L (ref 5–45)
BASOPHILS # BLD AUTO: 0.08 THOUSANDS/ΜL (ref 0–0.1)
BASOPHILS NFR BLD AUTO: 1 % (ref 0–1)
BILIRUB SERPL-MCNC: 0.18 MG/DL (ref 0.2–1)
BUN SERPL-MCNC: 11 MG/DL (ref 5–25)
CALCIUM SERPL-MCNC: 9 MG/DL (ref 8.3–10.1)
CHLORIDE SERPL-SCNC: 106 MMOL/L (ref 100–108)
CO2 SERPL-SCNC: 26 MMOL/L (ref 21–32)
CREAT SERPL-MCNC: 0.92 MG/DL (ref 0.6–1.3)
EOSINOPHIL # BLD AUTO: 0.08 THOUSAND/ΜL (ref 0–0.61)
EOSINOPHIL NFR BLD AUTO: 1 % (ref 0–6)
ERYTHROCYTE [DISTWIDTH] IN BLOOD BY AUTOMATED COUNT: 18.6 % (ref 11.6–15.1)
GFR SERPL CREATININE-BSD FRML MDRD: 76 ML/MIN/1.73SQ M
GLUCOSE SERPL-MCNC: 95 MG/DL (ref 65–140)
HCT VFR BLD AUTO: 36.6 % (ref 34.8–46.1)
HGB BLD-MCNC: 11.4 G/DL (ref 11.5–15.4)
HOLD SPECIMEN: NORMAL
IMM GRANULOCYTES # BLD AUTO: 0.03 THOUSAND/UL (ref 0–0.2)
IMM GRANULOCYTES NFR BLD AUTO: 0 % (ref 0–2)
INR PPP: 1.04 (ref 0.84–1.19)
LYMPHOCYTES # BLD AUTO: 2.5 THOUSANDS/ΜL (ref 0.6–4.47)
LYMPHOCYTES NFR BLD AUTO: 31 % (ref 14–44)
MCH RBC QN AUTO: 24.9 PG (ref 26.8–34.3)
MCHC RBC AUTO-ENTMCNC: 31.1 G/DL (ref 31.4–37.4)
MCV RBC AUTO: 80 FL (ref 82–98)
MONOCYTES # BLD AUTO: 0.76 THOUSAND/ΜL (ref 0.17–1.22)
MONOCYTES NFR BLD AUTO: 9 % (ref 4–12)
NEUTROPHILS # BLD AUTO: 4.72 THOUSANDS/ΜL (ref 1.85–7.62)
NEUTS SEG NFR BLD AUTO: 58 % (ref 43–75)
NRBC BLD AUTO-RTO: 0 /100 WBCS
PLATELET # BLD AUTO: 265 THOUSANDS/UL (ref 149–390)
PMV BLD AUTO: 11.5 FL (ref 8.9–12.7)
POTASSIUM SERPL-SCNC: 4.1 MMOL/L (ref 3.5–5.3)
PROT SERPL-MCNC: 7.5 G/DL (ref 6.4–8.2)
PROTHROMBIN TIME: 13.8 SECONDS (ref 11.6–14.5)
RBC # BLD AUTO: 4.58 MILLION/UL (ref 3.81–5.12)
SODIUM SERPL-SCNC: 142 MMOL/L (ref 136–145)
WBC # BLD AUTO: 8.17 THOUSAND/UL (ref 4.31–10.16)

## 2020-12-26 PROCEDURE — 85610 PROTHROMBIN TIME: CPT | Performed by: EMERGENCY MEDICINE

## 2020-12-26 PROCEDURE — 70496 CT ANGIOGRAPHY HEAD: CPT

## 2020-12-26 PROCEDURE — 96365 THER/PROPH/DIAG IV INF INIT: CPT

## 2020-12-26 PROCEDURE — 99291 CRITICAL CARE FIRST HOUR: CPT | Performed by: EMERGENCY MEDICINE

## 2020-12-26 PROCEDURE — 99285 EMERGENCY DEPT VISIT HI MDM: CPT

## 2020-12-26 PROCEDURE — 70498 CT ANGIOGRAPHY NECK: CPT

## 2020-12-26 PROCEDURE — 85730 THROMBOPLASTIN TIME PARTIAL: CPT | Performed by: EMERGENCY MEDICINE

## 2020-12-26 PROCEDURE — 36415 COLL VENOUS BLD VENIPUNCTURE: CPT

## 2020-12-26 PROCEDURE — 80053 COMPREHEN METABOLIC PANEL: CPT | Performed by: EMERGENCY MEDICINE

## 2020-12-26 PROCEDURE — 96375 TX/PRO/DX INJ NEW DRUG ADDON: CPT

## 2020-12-26 PROCEDURE — 85025 COMPLETE CBC W/AUTO DIFF WBC: CPT | Performed by: EMERGENCY MEDICINE

## 2020-12-26 PROCEDURE — 99223 1ST HOSP IP/OBS HIGH 75: CPT | Performed by: PHYSICIAN ASSISTANT

## 2020-12-26 RX ORDER — ACETAMINOPHEN 325 MG/1
650 TABLET ORAL EVERY 6 HOURS PRN
Status: DISCONTINUED | OUTPATIENT
Start: 2020-12-26 | End: 2020-12-30 | Stop reason: HOSPADM

## 2020-12-26 RX ORDER — ASPIRIN 81 MG/1
81 TABLET, CHEWABLE ORAL ONCE
Status: COMPLETED | OUTPATIENT
Start: 2020-12-26 | End: 2020-12-26

## 2020-12-26 RX ORDER — METOCLOPRAMIDE HYDROCHLORIDE 5 MG/ML
10 INJECTION INTRAMUSCULAR; INTRAVENOUS ONCE
Status: COMPLETED | OUTPATIENT
Start: 2020-12-26 | End: 2020-12-26

## 2020-12-26 RX ORDER — MELATONIN
1000 DAILY
COMMUNITY

## 2020-12-26 RX ORDER — HEPARIN SODIUM 1000 [USP'U]/ML
4000 INJECTION, SOLUTION INTRAVENOUS; SUBCUTANEOUS
Status: ACTIVE | OUTPATIENT
Start: 2020-12-26 | End: 2020-12-28

## 2020-12-26 RX ORDER — HEPARIN SODIUM 1000 [USP'U]/ML
4000 INJECTION, SOLUTION INTRAVENOUS; SUBCUTANEOUS ONCE
Status: COMPLETED | OUTPATIENT
Start: 2020-12-26 | End: 2020-12-26

## 2020-12-26 RX ORDER — MAGNESIUM SULFATE HEPTAHYDRATE 40 MG/ML
2 INJECTION, SOLUTION INTRAVENOUS ONCE
Status: COMPLETED | OUTPATIENT
Start: 2020-12-26 | End: 2020-12-26

## 2020-12-26 RX ORDER — KETOROLAC TROMETHAMINE 30 MG/ML
15 INJECTION, SOLUTION INTRAMUSCULAR; INTRAVENOUS ONCE
Status: COMPLETED | OUTPATIENT
Start: 2020-12-26 | End: 2020-12-26

## 2020-12-26 RX ORDER — ASPIRIN 81 MG/1
81 TABLET ORAL DAILY
Status: DISCONTINUED | OUTPATIENT
Start: 2020-12-27 | End: 2020-12-30 | Stop reason: HOSPADM

## 2020-12-26 RX ORDER — DIPHENHYDRAMINE HYDROCHLORIDE 50 MG/ML
25 INJECTION INTRAMUSCULAR; INTRAVENOUS ONCE
Status: COMPLETED | OUTPATIENT
Start: 2020-12-26 | End: 2020-12-26

## 2020-12-26 RX ORDER — HEPARIN SODIUM 1000 [USP'U]/ML
2000 INJECTION, SOLUTION INTRAVENOUS; SUBCUTANEOUS
Status: DISPENSED | OUTPATIENT
Start: 2020-12-26 | End: 2020-12-28

## 2020-12-26 RX ORDER — HEPARIN SODIUM 10000 [USP'U]/100ML
3-20 INJECTION, SOLUTION INTRAVENOUS
Status: DISPENSED | OUTPATIENT
Start: 2020-12-26 | End: 2020-12-28

## 2020-12-26 RX ADMIN — KETOROLAC TROMETHAMINE 15 MG: 30 INJECTION, SOLUTION INTRAMUSCULAR at 19:11

## 2020-12-26 RX ADMIN — DIPHENHYDRAMINE HYDROCHLORIDE 25 MG: 50 INJECTION, SOLUTION INTRAMUSCULAR; INTRAVENOUS at 19:10

## 2020-12-26 RX ADMIN — IOHEXOL 85 ML: 350 INJECTION, SOLUTION INTRAVENOUS at 19:36

## 2020-12-26 RX ADMIN — HEPARIN SODIUM 12 UNITS/KG/HR: 10000 INJECTION, SOLUTION INTRAVENOUS at 22:50

## 2020-12-26 RX ADMIN — HEPARIN SODIUM 4000 UNITS: 1000 INJECTION INTRAVENOUS; SUBCUTANEOUS at 22:50

## 2020-12-26 RX ADMIN — ASPIRIN 81 MG CHEWABLE TABLET 81 MG: 81 TABLET CHEWABLE at 22:24

## 2020-12-26 RX ADMIN — METOCLOPRAMIDE HYDROCHLORIDE 10 MG: 5 INJECTION INTRAMUSCULAR; INTRAVENOUS at 19:11

## 2020-12-26 RX ADMIN — MAGNESIUM SULFATE HEPTAHYDRATE 2 G: 40 INJECTION, SOLUTION INTRAVENOUS at 19:16

## 2020-12-26 NOTE — LETTER
[facility] FLOOR MED SURG UNIT    [place] [address]  Dept: [phone]    December 30, 2020     Patient: Reji Carbajal   YOB: 1975   Date of Visit: 12/26/2020       To Whom it May Concern:    Reji Carbajal is under my professional care  She was seen in the hospital from 12/26/2020   to 12/30/20  She may complete online coursework but avoid clinical activities at this time       If you have any questions or concerns, please don't hesitate to call           Sincerely,          Aydin So MD

## 2020-12-27 LAB
APTT PPP: 53 SECONDS (ref 23–37)
APTT PPP: 71 SECONDS (ref 23–37)
APTT PPP: 93 SECONDS (ref 23–37)
ERYTHROCYTE [DISTWIDTH] IN BLOOD BY AUTOMATED COUNT: 18.6 % (ref 11.6–15.1)
FERRITIN SERPL-MCNC: 8 NG/ML (ref 8–388)
HCT VFR BLD AUTO: 35.7 % (ref 34.8–46.1)
HGB BLD-MCNC: 11.3 G/DL (ref 11.5–15.4)
IRON SATN MFR SERPL: 11 %
IRON SERPL-MCNC: 48 UG/DL (ref 50–170)
MCH RBC QN AUTO: 24.8 PG (ref 26.8–34.3)
MCHC RBC AUTO-ENTMCNC: 31.7 G/DL (ref 31.4–37.4)
MCV RBC AUTO: 79 FL (ref 82–98)
PLATELET # BLD AUTO: 245 THOUSANDS/UL (ref 149–390)
PMV BLD AUTO: 11.1 FL (ref 8.9–12.7)
RBC # BLD AUTO: 4.55 MILLION/UL (ref 3.81–5.12)
TIBC SERPL-MCNC: 420 UG/DL (ref 250–450)
WBC # BLD AUTO: 7.14 THOUSAND/UL (ref 4.31–10.16)

## 2020-12-27 PROCEDURE — 85027 COMPLETE CBC AUTOMATED: CPT | Performed by: PHYSICIAN ASSISTANT

## 2020-12-27 PROCEDURE — NC001 PR NO CHARGE: Performed by: NEUROLOGICAL SURGERY

## 2020-12-27 PROCEDURE — 85730 THROMBOPLASTIN TIME PARTIAL: CPT | Performed by: INTERNAL MEDICINE

## 2020-12-27 PROCEDURE — 99254 IP/OBS CNSLTJ NEW/EST MOD 60: CPT | Performed by: SURGERY

## 2020-12-27 PROCEDURE — 99255 IP/OBS CONSLTJ NEW/EST HI 80: CPT | Performed by: PSYCHIATRY & NEUROLOGY

## 2020-12-27 PROCEDURE — 99232 SBSQ HOSP IP/OBS MODERATE 35: CPT | Performed by: INTERNAL MEDICINE

## 2020-12-27 PROCEDURE — 83550 IRON BINDING TEST: CPT | Performed by: CHIROPRACTOR

## 2020-12-27 PROCEDURE — NC001 PR NO CHARGE: Performed by: SURGERY

## 2020-12-27 PROCEDURE — 83540 ASSAY OF IRON: CPT | Performed by: CHIROPRACTOR

## 2020-12-27 PROCEDURE — 82728 ASSAY OF FERRITIN: CPT | Performed by: CHIROPRACTOR

## 2020-12-27 RX ADMIN — HEPARIN SODIUM 14 UNITS/KG/HR: 10000 INJECTION, SOLUTION INTRAVENOUS at 11:49

## 2020-12-27 RX ADMIN — ASPIRIN 81 MG: 81 TABLET ORAL at 09:19

## 2020-12-27 RX ADMIN — HEPARIN SODIUM 2000 UNITS: 1000 INJECTION INTRAVENOUS; SUBCUTANEOUS at 11:48

## 2020-12-27 RX ADMIN — ACETAMINOPHEN 650 MG: 325 TABLET, FILM COATED ORAL at 07:40

## 2020-12-27 RX ADMIN — ACETAMINOPHEN 650 MG: 325 TABLET, FILM COATED ORAL at 19:05

## 2020-12-27 NOTE — PLAN OF CARE
Problem: PAIN - ADULT  Goal: Verbalizes/displays adequate comfort level or baseline comfort level  Description: Interventions:  - Encourage patient to monitor pain and request assistance  - Assess pain using appropriate pain scale  - Administer analgesics based on type and severity of pain and evaluate response  - Implement non-pharmacological measures as appropriate and evaluate response  - Consider cultural and social influences on pain and pain management  - Notify physician/advanced practitioner if interventions unsuccessful or patient reports new pain  Outcome: Progressing     Problem: INFECTION - ADULT  Goal: Absence or prevention of progression during hospitalization  Description: INTERVENTIONS:  - Assess and monitor for signs and symptoms of infection  - Monitor lab/diagnostic results  - Monitor all insertion sites, i e  indwelling lines, tubes, and drains  - Monitor endotracheal if appropriate and nasal secretions for changes in amount and color  - [place] appropriate cooling/warming therapies per order  - Administer medications as ordered  - Instruct and encourage patient and family to use good hand hygiene technique  - Identify and instruct in appropriate isolation precautions for identified infection/condition  Outcome: Progressing  Goal: Absence of fever/infection during neutropenic period  Description: INTERVENTIONS:  - Monitor WBC    Outcome: Progressing     Problem: SAFETY ADULT  Goal: Patient will remain free of falls  Description: INTERVENTIONS:  - Assess patient frequently for physical needs  -  Identify cognitive and physical deficits and behaviors that affect risk of falls    -  [place] fall precautions as indicated by assessment   - Educate patient/family on patient safety including physical limitations  - Instruct patient to call for assistance with activity based on assessment  - Modify environment to reduce risk of injury  - Consider OT/PT consult to assist with strengthening/mobility  Outcome: Progressing  Goal: Maintain or return to baseline ADL function  Description: INTERVENTIONS:  -  Assess patient's ability to carry out ADLs; assess patient's baseline for ADL function and identify physical deficits which impact ability to perform ADLs (bathing, care of mouth/teeth, toileting, grooming, dressing, etc )  - Assess/evaluate cause of self-care deficits   - Assess range of motion  - Assess patient's mobility; develop plan if impaired  - Assess patient's need for assistive devices and provide as appropriate  - Encourage maximum independence but intervene and supervise when necessary  - Involve family in performance of ADLs  - Assess for home care needs following discharge   - Consider OT consult to assist with ADL evaluation and planning for discharge  - Provide patient education as appropriate  Outcome: Progressing  Goal: Maintain or return mobility status to optimal level  Description: INTERVENTIONS:  - Assess patient's baseline mobility status (ambulation, transfers, stairs, etc )    - Identify cognitive and physical deficits and behaviors that affect mobility  - Identify mobility aids required to assist with transfers and/or ambulation (gait belt, sit-to-stand, lift, walker, cane, etc )  - [place] fall precautions as indicated by assessment  - Record patient progress and toleration of activity level on Mobility SBAR; progress patient to next Phase/Stage  - Instruct patient to call for assistance with activity based on assessment  - Consider rehabilitation consult to assist with strengthening/weightbearing, etc   Outcome: Progressing     Problem: DISCHARGE PLANNING  Goal: Discharge to home or other facility with appropriate resources  Description: INTERVENTIONS:  - Identify barriers to discharge w/patient and caregiver  - Arrange for needed discharge resources and transportation as appropriate  - Identify discharge learning needs (meds, wound care, etc )  - Arrange for interpretive services to assist at discharge as needed  - Refer to Case Management Department for coordinating discharge planning if the patient needs post-hospital services based on physician/advanced practitioner order or complex needs related to functional status, cognitive ability, or social support system  Outcome: Progressing     Problem: Knowledge Deficit  Goal: Patient/family/caregiver demonstrates understanding of disease process, treatment plan, medications, and discharge instructions  Description: Complete learning assessment and assess knowledge base    Interventions:  - Provide teaching at level of understanding  - Provide teaching via preferred learning methods  Outcome: Progressing

## 2020-12-27 NOTE — CONSULTS
PATIENT NAME: Zafar Young  YOB: 1975   MEDICAL RECORD NUMBER: [id number]  Chief Complaint/Reason for Consult: carotid dissection     HPI: Zafar Young is a 41 y o  female with history of IBS who came in with a new onset headache which has been bothering her since she had chiropractic manipulation of the neck last Thursday  She was noted on CTA h/n to have occlusion of the ICA (per personal review there may be some trace flow through it actually)  She has no associated neurological symptoms other than headache  She did feel a little light headed and off but is currently at baseline  Headache Is left temple, occipital  She gets monthly headaches with her periods but this was much more severe, persistent  She did have some photophobia with it         PAST MEDICAL HISTORY  Past Medical History:   Diagnosis Date    Asthma     ACTIVITY INDUCED ASTHMA    IBS (irritable bowel syndrome)         PAST SURGICAL HISTORY  Past Surgical History:   Procedure Laterality Date    ABDOMINAL SURGERY      LAPROSCOPY FOR ENDOMETRIOSIS    CERVICAL BIOPSY  W/ LOOP ELECTRODE EXCISION      FINGER SURGERY      LEFT MIDDLE FINGER SURGERY 5 YRS AGO    TUBAL LIGATION          ALLERGIES: Levaquin [levofloxacin], Metronidazole, Other, and Latex     CURRENT MEDICATIONS  Scheduled Meds:  Current Facility-Administered Medications   Medication Dose Route Frequency Provider Last Rate    acetaminophen  650 mg Oral Q6H PRN Leonid Kid, PA-C      aspirin  81 mg Oral Daily Leonid Kid, PA-C      heparin (porcine)  3-20 Units/kg/hr (Order-Specific) Intravenous Titrated Leonid Kid, PA-C 14 Units/kg/hr (12/27/20 1149)    heparin (porcine)  2,000 Units Intravenous Q1H PRN Leonid Kid, PA-C      heparin (porcine)  4,000 Units Intravenous Q1H PRN Leonid Kid, PA-C       Continuous Infusions:heparin (porcine), 3-20 Units/kg/hr (Order-Specific), Last Rate: 14 Units/kg/hr (12/27/20 1149)      PRN Meds:   acetaminophen   heparin (porcine)    heparin (porcine)     SOCIAL HISTORY   reports that she has quit smoking  She has never used smokeless tobacco  She reports previous alcohol use  She reports that she does not use drugs  FAMILY HISTORY  History reviewed  No pertinent family history  REVIEW OF SYSTEMS   Extensive 12 point ROS conducted, negative except for HPI  PHYSICAL EXAMINATION  Temp:  [98 2 °F (36 8 °C)-99 °F (37 2 °C)] 99 °F (37 2 °C)  HR:  [64-80] 67  Resp:  [16-18] 18  BP: (103-126)/(53-82) 111/55   General Examination: In no apparent distress, well developed and well nourished, and cooperative   HEENT: Normocephalic, Atraumatic  Moist mucus membranes  Anicteric  PPC    CVS: Regular rate and rhythm  S1 S2 noted  No audible murmurs  No carotids bruits  Peripheral pulses palpable throughout   Lungs: Clear to auscultation bilaterally  No rales, rhonchi, wheezing  Abdomen: Bowel sounds positive  Non- tender  Non-distended  No organomegaly  Ext: No edema   Psych: Thought content - No VH/AH  No delusions  Though Process - logical    Skin - No rash    Neurological Examination:   Mental Status: The patient was awake, alert, attentive, oriented to person, place, and time  Recent and remote memory intact to conversaon with no evidence of language dysfunction  Satisfactory fund of knowledge  Normal attention span and concentration  Cranial Nerves:   I: smell Not tested   II: visual fields Full to confrontation  Pupils equal, round, reactive to light with normal accomodation  Fundus: unable to visualize  III,IV,VI: extraocular muscles EOMI, no nystagmus   V: masseter and pterygoid strength full  Sensation in the V1 through V3 distributions  VII: Face is symmetric with no weakness noted  VIII: Audition intact to finger rub bilaterally  IX/X: Uvula midline  Soft palate elevation symmetric  XI: Trapezius and SCM strength 5/5 B/L     XII: Tongue midline with no atrophy or fasciculations with appropriate movement  Motor Examination:   No pronator drift  Bulk: Normal  No atrophy Tone: Normal  Fasciculations: None  Full strength b/l UE/LE  Reflexes:   2+ symmetric b/l  Toes down  Coordination: Patient able to perform normal finger-to-nose and heel to shin appropriately  Sensory: Non lateralizing b/l  Gait:normal stance and posture, negative rombergs  Couldn't ambulate further due to IV lines and such       Labs:  Recent Results (from the past 24 hour(s))   CBC and differential    Collection Time: 12/26/20  6:11 PM   Result Value Ref Range    WBC 8 17 4 31 - 10 16 Thousand/uL    RBC 4 58 3 81 - 5 12 Million/uL    Hemoglobin 11 4 (L) 11 5 - 15 4 g/dL    Hematocrit 36 6 34 8 - 46 1 %    MCV 80 (L) 82 - 98 fL    MCH 24 9 (L) 26 8 - 34 3 pg    MCHC 31 1 (L) 31 4 - 37 4 g/dL    RDW 18 6 (H) 11 6 - 15 1 %    MPV 11 5 8 9 - 12 7 fL    Platelets 590 180 - 907 Thousands/uL    nRBC 0 /100 WBCs    Neutrophils Relative 58 43 - 75 %    Immat GRANS % 0 0 - 2 %    Lymphocytes Relative 31 14 - 44 %    Monocytes Relative 9 4 - 12 %    Eosinophils Relative 1 0 - 6 %    Basophils Relative 1 0 - 1 %    Neutrophils Absolute 4 72 1 85 - 7 62 Thousands/µL    Immature Grans Absolute 0 03 0 00 - 0 20 Thousand/uL    Lymphocytes Absolute 2 50 0 60 - 4 47 Thousands/µL    Monocytes Absolute 0 76 0 17 - 1 22 Thousand/µL    Eosinophils Absolute 0 08 0 00 - 0 61 Thousand/µL    Basophils Absolute 0 08 0 00 - 0 10 Thousands/µL   Comprehensive metabolic panel    Collection Time: 12/26/20  6:11 PM   Result Value Ref Range    Sodium 142 136 - 145 mmol/L    Potassium 4 1 3 5 - 5 3 mmol/L    Chloride 106 100 - 108 mmol/L    CO2 26 21 - 32 mmol/L    ANION GAP 10 4 - 13 mmol/L    BUN 11 5 - 25 mg/dL    Creatinine 0 92 0 60 - 1 30 mg/dL    Glucose 95 65 - 140 mg/dL    Calcium 9 0 8 3 - 10 1 mg/dL    AST 14 5 - 45 U/L    ALT 21 12 - 78 U/L    Alkaline Phosphatase 48 46 - 116 U/L    Total Protein 7 5 6 4 - 8 2 g/dL    Albumin 3 9 3 5 - 5 0 g/dL    Total Bilirubin 0 18 (L) 0 20 - 1 00 mg/dL    eGFR 76 ml/min/1 73sq m   Green / Black tube on hold    Collection Time: 12/26/20  6:11 PM   Result Value Ref Range    Extra Tube Hold for add-ons  APTT six (6) hours after Heparin bolus/drip initiation or dosing change    Collection Time: 12/26/20 10:32 PM   Result Value Ref Range    PTT 25 23 - 37 seconds   Protime-INR    Collection Time: 12/26/20 10:32 PM   Result Value Ref Range    Protime 13 8 11 6 - 14 5 seconds    INR 1 04 0 84 - 1 19   CBC    Collection Time: 12/27/20  4:46 AM   Result Value Ref Range    WBC 7 14 4 31 - 10 16 Thousand/uL    RBC 4 55 3 81 - 5 12 Million/uL    Hemoglobin 11 3 (L) 11 5 - 15 4 g/dL    Hematocrit 35 7 34 8 - 46 1 %    MCV 79 (L) 82 - 98 fL    MCH 24 8 (L) 26 8 - 34 3 pg    MCHC 31 7 31 4 - 37 4 g/dL    RDW 18 6 (H) 11 6 - 15 1 %    Platelets 415 343 - 462 Thousands/uL    MPV 11 1 8 9 - 12 7 fL   APTT    Collection Time: 12/27/20  4:46 AM   Result Value Ref Range    PTT 71 (H) 23 - 37 seconds   APTT    Collection Time: 12/27/20 10:43 AM   Result Value Ref Range    PTT 53 (H) 23 - 37 seconds            panel  Results from last 7 days   Lab Units 12/26/20  1811   POTASSIUM mmol/L 4 1   CHLORIDE mmol/L 106   BUN mg/dL 11   CREATININE mg/dL 0 92      Results from last 7 days   Lab Units 12/27/20  0446   HEMATOCRIT % 35 7   HEMOGLOBIN g/dL 11 3*   MCH pg 24 8*   MCHC g/dL 31 7   MCV fL 79*   MPV fL 11 1   PLATELETS Thousands/uL 245   RDW % 18 6*   WBC Thousand/uL 7 14      Results from last 7 days   Lab Units 12/27/20  1043  12/26/20  2232   INR   --   --  1 04   PTT seconds 53*   < > 25    < > = values in this interval not displayed        Results from last 7 days   Lab Units 12/26/20  1811   SODIUM mmol/L 142   CO2 mmol/L 26   BUN mg/dL 11   CREATININE mg/dL 0 92      Lab Results   Component Value Date    ALT 21 12/26/2020    AST 14 12/26/2020    ALKPHOS 48 12/26/2020    TBILI 0 18 (L) 12/26/2020          Invalid input(s): TRIGLYCERIDE   Lab Results   Component Value Date    HGBA1C 5 3 12/21/2018    TSH i: No results found for: TSH Imaging: CT head         ASSESSMENT/PLAN  42 yo female with carotid dissection with near occlusion  Agree with heparin infusion and aspirin  On Monday 12/28/20 can transition to NOAC and aspirin combination  Would repeat CTA in 2-3 weeks, if improving, continuing medical management, if not but still has some patency as I suspect then would consider a stent  Would have her f/u with neuro intervention, Dr Danielle White to follow this CTA  In 3 weeks  Will also f/u with vascular neurology  Call with questions

## 2020-12-27 NOTE — PROGRESS NOTES
Progress Note - Vascular Surgery   Deanna Tierney 41 y o  female MRN: [id number]  Unit/Bed#: S -01 Encounter: [id number]    Assessment/Plan:  41 y o  female with R ICA dissection c/w total occlusion following chiropractic manipulation    · Continue ASA  · Continue heparin gtt  · Follow up Neurology c/s  · Continue neurochecks  · Care per primary team    Subjective/Objective     Subjective: No acute events  No new complaints  Objective:     Vitals: Temp:  [98 2 °F (36 8 °C)-98 4 °F (36 9 °C)] 98 4 °F (36 9 °C)  HR:  [66-80] 70  Resp:  [16] 16  BP: (103-126)/(53-78) 107/53  Body mass index is 28 32 kg/m²  I/O       12/25 0701 - 12/26 0700 12/26 0701 - 12/27 0700    IV Piggyback  50    Total Intake(mL/kg)  50 (0 7)    Net  +50                Physical Exam:  GEN: NAD  HEENT: MMM  CV: warm/well perfused  Lung: Normal effort  Ab: Soft, NT/ND  Extrem: No CCE  Neuro:  A+Ox3, no focal neurologic deficits    Lab, Imaging and other studies:   CBC with diff:   Lab Results   Component Value Date    WBC 7 14 12/27/2020    HGB 11 3 (L) 12/27/2020    HCT 35 7 12/27/2020    MCV 79 (L) 12/27/2020     12/27/2020    MCH 24 8 (L) 12/27/2020    MCHC 31 7 12/27/2020    RDW 18 6 (H) 12/27/2020    MPV 11 1 12/27/2020    NRBC 0 12/26/2020   , BMP/CMP:   Lab Results   Component Value Date    SODIUM 142 12/26/2020    K 4 1 12/26/2020     12/26/2020    CO2 26 12/26/2020    BUN 11 12/26/2020    CREATININE 0 92 12/26/2020    CALCIUM 9 0 12/26/2020    AST 14 12/26/2020    ALT 21 12/26/2020    ALKPHOS 48 12/26/2020    EGFR 76 12/26/2020   , Magnesium: No components found for: MAG, Coags:   Lab Results   Component Value Date    PTT 71 (H) 12/27/2020    INR 1 04 12/26/2020     VTE Pharmacologic Prophylaxis: Heparin  VTE Mechanical Prophylaxis: sequential compression device

## 2020-12-27 NOTE — CONSULTS
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) following cervical chiropractic manipulation    Plan:  · Recommend medicine admission, no acute vascular intervention  · Recommend Neurology consult  · Heparin gtt, ACS low protocol  · ASA 81  · Continue neurochecks    History of Present Illness     HPI:  Unruly Andrade is a 41 y o  female who presents with headache  Patient underwent chiropractic manipulation last Sunday (12/20) and has had persistent headache since  The headache has been intermittent since then  Improved with NSAIDs  Denies weakness, numbness, tingling, difficulty speaking, facial droop, change in vision or n/v  She presented to the ED on 12/24 with headache and was treated with migraine cocktail and recommended Neurology follow up at the time  She presented to the ED today given persistence of her symptoms  Workup in the ED notable for CTA demonstrating total occlusion of R ICA  Inpatient consult to Vascular Surgery  Consult performed by: Darren Curtis MD  Consult ordered by: La Smith DO           Review of Systems   Constitutional: Negative for chills and fever  HENT: Negative  Eyes: Negative for visual disturbance  Respiratory: Negative for shortness of breath  Cardiovascular: Negative for chest pain  Gastrointestinal: Negative for abdominal pain, nausea and vomiting  Endocrine: Negative  Genitourinary: Negative  Musculoskeletal: Negative  Negative for neck pain  Skin: Negative  Allergic/Immunologic: Negative  Neurological: Positive for headaches  Negative for dizziness, seizures, syncope, facial asymmetry, speech difficulty, weakness, light-headedness and numbness  Hematological: Negative  Psychiatric/Behavioral: Negative          Historical Information   Past Medical History:   Diagnosis Date    Asthma     ACTIVITY INDUCED ASTHMA    IBS (irritable bowel syndrome)      Past Surgical History:   Procedure Laterality Date    ABDOMINAL SURGERY      LAPROSCOPY FOR ENDOMETRIOSIS    CERVICAL BIOPSY  W/ LOOP ELECTRODE EXCISION      FINGER SURGERY      LEFT MIDDLE FINGER SURGERY 5 YRS AGO    TUBAL LIGATION       Social History   Social History     Substance and Sexual Activity   Alcohol Use Not Currently     Social History     Substance and Sexual Activity   Drug Use No     E-Cigarette/Vaping    E-Cigarette Use Never User      E-Cigarette/Vaping Substances     Social History     Tobacco Use   Smoking Status Former Smoker   Smokeless Tobacco Never Used   Tobacco Comment    NO SMOKING IN 11 YEARS     Family History: History reviewed  No pertinent family history  Meds/Allergies   current meds:   Current Facility-Administered Medications   Medication Dose Route Frequency    heparin (porcine) 25,000 units in 0 45% NaCl 250 mL infusion (premix)  3-20 Units/kg/hr (Order-Specific) Intravenous Titrated    heparin (porcine) injection 2,000 Units  2,000 Units Intravenous Q1H PRN    heparin (porcine) injection 4,000 Units  4,000 Units Intravenous Once    heparin (porcine) injection 4,000 Units  4,000 Units Intravenous Q1H PRN     Allergies   Allergen Reactions    Levaquin [Levofloxacin] Other (See Comments)     "nervous system reaction, anxiety for 2 months"      Metronidazole Hives    Other      "i took it for a yeast infection it started with the letter M"    Latex Rash       Objective   First Vitals:   Blood Pressure: 124/78 (12/26/20 1743)  Pulse: 80 (12/26/20 1743)  Temperature: 98 2 °F (36 8 °C) (12/26/20 1746)  Temp Source: Oral (12/26/20 1743)  Respirations: 16 (12/26/20 1743)  Height: 5' 4" (162 6 cm) (12/26/20 1743)  Weight - Scale: 74 8 kg (165 lb) (12/26/20 1743)  SpO2: 100 % (12/26/20 1743)    Current Vitals:   Blood Pressure: 113/61 (12/26/20 2110)  Pulse: 76 (12/26/20 2110)  Temperature: 98 2 °F (36 8 °C) (12/26/20 1746)  Temp Source: Oral (12/26/20 1746)  Respirations: 16 (12/26/20 1743)  Height: 5' 4" (162 6 cm) (12/26/20 1743)  Weight - Scale: 74 8 kg (165 lb) (12/26/20 1743)  SpO2: 99 % (12/26/20 2110)    No intake or output data in the 24 hours ending 12/26/20 2146    Invasive Devices     Peripheral Intravenous Line            Peripheral IV 12/26/20 Right Antecubital less than 1 day                Physical Exam  Constitutional:       General: She is not in acute distress  Appearance: Normal appearance  She is not ill-appearing  HENT:      Head: Normocephalic and atraumatic  Nose: Nose normal    Eyes:      Extraocular Movements: Extraocular movements intact  Neck:      Musculoskeletal: Normal range of motion and neck supple  Cardiovascular:      Comments: Warm/well perfused  Pulmonary:      Effort: Pulmonary effort is normal  No respiratory distress  Genitourinary:     Comments: Deferred  Musculoskeletal:         General: No swelling, tenderness or deformity  Skin:     General: Skin is warm and dry  Neurological:      General: No focal deficit present  Mental Status: She is alert and oriented to person, place, and time  Cranial Nerves: No cranial nerve deficit  Sensory: No sensory deficit  Motor: No weakness  Comments: No facial asymmetry or tongue deviation  Strength 5/5 throughout  Sensation grossly intact     Psychiatric:         Mood and Affect: Mood normal          Behavior: Behavior normal          Lab Results:   CBC:   Lab Results   Component Value Date    WBC 8 17 12/26/2020    HGB 11 4 (L) 12/26/2020    HCT 36 6 12/26/2020    MCV 80 (L) 12/26/2020     12/26/2020    MCH 24 9 (L) 12/26/2020    MCHC 31 1 (L) 12/26/2020    RDW 18 6 (H) 12/26/2020    MPV 11 5 12/26/2020    NRBC 0 12/26/2020   , CMP:   Lab Results   Component Value Date    SODIUM 142 12/26/2020    K 4 1 12/26/2020     12/26/2020    CO2 26 12/26/2020    BUN 11 12/26/2020    CREATININE 0 92 12/26/2020    CALCIUM 9 0 12/26/2020    AST 14 12/26/2020    ALT 21 12/26/2020    ALKPHOS 48 12/26/2020    EGFR 76 12/26/2020   , Coagulation: No results found for: PT, INR, APTT  Imaging: I have personally reviewed pertinent reports  and I have personally reviewed pertinent films in PACS  CTA head and neck with and without contrast   Final Result by Sonia Peacock MD (12/26 2103)      High-grade stenosis terminating in occlusion at the C1 level of the right cervical ICA suggesting dissection  There is decreased flow distally in the right cervical and right cavernous ICA possibly from retrograde flow  No evidence of acute intracranial hemorrhage               I personally discussed this study with Desi Yuan on 12/26/2020 at 8:37 PM                            Workstation performed: [id number]

## 2020-12-27 NOTE — PROGRESS NOTES
Progress Note Adry Gayle 1975, 41 y o  female MRN: [id number]    Unit/Bed#: S -01 Encounter: [id number]    Primary Care Provider: Julián Johnson MD   Date and time admitted to hospital: 2020  5:48 PM      Acute nonintractable headache  Assessment & Plan  · Likely due to above  · Pain control  · Monitor neuro checks  Anemia  Assessment & Plan  · Hg 11 4 on admission  11 3 this AM  · Continue to monitor, repeat CBC in AM     * Internal carotid artery dissection [facility])  Assessment & Plan  · Presented with a persistent headache which began after chiropractic manipulation of her neck on 2020  · CTA head and neck: "High-grade stenosis terminating in occlusion at the C1 level of the right cervical ICA suggesting dissection  There is decreased flow distally in the right cervical and right cavernous ICA possibly from retrograde flow "  · Admit to step down level 2 for close monitoring  · Monitor neuro checks q1h and notify of any changes  · Vascular surgery consulted, no acute vascular intervention recommended at this time  · Started on IV heparin while in the ED which will be continued  · Aspirin 81 mg daily  · Neurology consulted  VTE Pharmacologic Prophylaxis:   Pharmacologic: Heparin  Mechanical VTE Prophylaxis in Place: Yes    Discussions with Specialists or Other Care Team Provider:     Education and Discussions with Family / Patient: yes    Current Length of Stay: 1 day(s)    Current Patient Status: Inpatient     Discharge Plan / Estimated Discharge Date: unknown at present    Code Status: Level 1 - Full Code      Subjective:      Complaining of mild pain in left shin  Otherwise headache has improved    Started having her regular periods today    Objective:     Vitals:   Temp (24hrs), Av 5 °F (36 9 °C), Min:98 2 °F (36 8 °C), Max:99 °F (37 2 °C)    Temp:  [98 2 °F (36 8 °C)-99 °F (37 2 °C)] 99 °F (37 2 °C)  HR:  [64-80] 67  Resp:  [16-18] 18  BP: (103-126)/(53-82) 111/55  SpO2:  [98 %-100 %] 100 %  Body mass index is 28 32 kg/m²  Input and Output Summary (last 24 hours): Intake/Output Summary (Last 24 hours) at 12/27/2020 1148  Last data filed at 12/27/2020 0918  Gross per 24 hour   Intake 190 ml   Output 650 ml   Net -460 ml       Physical Exam:     Physical Exam     Gen -Patient comfortable at rest  Neck- Supple  No thyromegaly or lymphadenopathy  Lungs-Clear bilaterally without any wheeze or rales   Heart S1-S2, regular rate and rhythm, no murmurs  Abdomen-soft nontender, no organomegaly  Bowel sounds present  Extremities-no cyanosi,  clubbing or edema  Skin- no rash  Neuro-nonfocal       Additional Data:     Labs:    Results from last 7 days   Lab Units 12/27/20  0446 12/26/20  1811   WBC Thousand/uL 7 14 8 17   HEMOGLOBIN g/dL 11 3* 11 4*   HEMATOCRIT % 35 7 36 6   PLATELETS Thousands/uL 245 265   NEUTROS PCT %  --  58   LYMPHS PCT %  --  31   MONOS PCT %  --  9   EOS PCT %  --  1     Results from last 7 days   Lab Units 12/26/20  1811   POTASSIUM mmol/L 4 1   CHLORIDE mmol/L 106   CO2 mmol/L 26   BUN mg/dL 11   CREATININE mg/dL 0 92   CALCIUM mg/dL 9 0   ALK PHOS U/L 48   ALT U/L 21   AST U/L 14     Results from last 7 days   Lab Units 12/26/20  2232   INR  1 04       * I Have Reviewed All Lab Data Listed Above  * Additional Pertinent Lab Tests Reviewed:  All Labs Within Last 24 Hours Reviewed    Imaging:    Imaging Reports Reviewed Today Include: CTA head & Neck 12/26          Last 24 Hours Medication List:   Current Facility-Administered Medications   Medication Dose Route Frequency Provider Last Rate    acetaminophen  650 mg Oral Q6H PRN Theadora Tenafly, PA-C      aspirin  81 mg Oral Daily Theadora Tenafly, PA-C      heparin (porcine)  3-20 Units/kg/hr (Order-Specific) Intravenous Titrated Theadora Tenafly, PA-C 12 Units/kg/hr (12/26/20 2250)    heparin (porcine)  2,000 Units Intravenous Q1H PRN Theadora Tenafly, PA-C      heparin (porcine) 4,000 Units Intravenous Q1H PRN Stephanie Wolfe PA-C          Today, Patient Was Seen By: Dianelys Marin MD    ** Please Note: This note has been constructed using a voice recognition system   **

## 2020-12-27 NOTE — ED PROVIDER NOTES
nHistory  Chief Complaint   Patient presents with    Headache - New Onset or New Symptoms     Pt complains of a headache since monday  Pt states that she was here on thursday for the same and since then the headache has changes and gotten worse       History provided by:  Patient and spouse  Headache - New Onset or New Symptoms  Pain location:  L temporal, frontal and occipital  Quality:  Dull  Radiates to:  Eyes  Severity currently:  9/10  Severity at highest:  10/10  Onset quality:  Gradual  Duration:  5 days  Timing:  Intermittent  Progression:  Waxing and waning  Chronicity:  New  Context comment:  Started day after chiropractic manipulation of the neck which was new for her, no history of migraines, seen here yesterday felt better upon discharge, worsened shortly after getting home continued worsened today prompting ED visit  Relieved by:  None tried  Worsened by:  Nothing  Ineffective treatments:  None tried  Associated symptoms: no abdominal pain, no congestion, no cough, no diarrhea, no dizziness, no eye pain, no fever, no nausea, no neck pain, no neck stiffness, no numbness, no sinus pressure, no sore throat and no vomiting        Prior to Admission Medications   Prescriptions Last Dose Informant Patient Reported? Taking? CHLOROPHYLL PO   Yes No   Sig: Take 1 Dose by mouth daily   LYSINE PO   Yes No   Sig: Take 1 tablet by mouth daily   Spirulina 500 MG TABS   Yes No   Sig: Take 1 tablet by mouth daily   UNKNOWN TO PATIENT   Yes No   UNKNOWN TO PATIENT   Yes No   UNKNOWN TO PATIENT   Yes No   UNKNOWN TO PATIENT   Yes No   azithromycin (ZITHROMAX) 200 mg/5 mL suspension   No No   Si mg (9 1 ml) by mouth daily for 4 days     b complex vitamins tablet   Yes No   Sig: Take 1 tablet by mouth daily   ibuprofen (MOTRIN) 600 mg tablet   No No   Sig: Take 1 tablet by mouth every 8 (eight) hours as needed for mild pain or moderate pain (with food) for up to 7 days   multivitamin (THERAGRAN) TABS   Yes No Sig: Take 1 tablet by mouth daily   naproxen sodium (ANAPROX) 550 mg tablet   No No   Sig: Take 1 tablet (550 mg total) by mouth 2 (two) times a day with meals for 10 days   omeprazole (PriLOSEC) 20 mg delayed release capsule   No No   Sig: Take 1 capsule (20 mg total) by mouth daily for 20 days   sucralfate (CARAFATE) 1 g/10 mL suspension   Yes No   Sig: Take 1 g by mouth daily      Facility-Administered Medications: None       Past Medical History:   Diagnosis Date    Asthma     ACTIVITY INDUCED ASTHMA    IBS (irritable bowel syndrome)        Past Surgical History:   Procedure Laterality Date    ABDOMINAL SURGERY      LAPROSCOPY FOR ENDOMETRIOSIS    CERVICAL BIOPSY  W/ LOOP ELECTRODE EXCISION      FINGER SURGERY      LEFT MIDDLE FINGER SURGERY 5 YRS AGO    TUBAL LIGATION         History reviewed  No pertinent family history  I have reviewed and agree with the history as documented  E-Cigarette/Vaping    E-Cigarette Use Never User      E-Cigarette/Vaping Substances     Social History     Tobacco Use    Smoking status: Former Smoker    Smokeless tobacco: Never Used    Tobacco comment: NO SMOKING IN 11 YEARS   Substance Use Topics    Alcohol use: Not Currently    Drug use: No       Review of Systems   Constitutional: Negative for activity change, chills, diaphoresis and fever  HENT: Negative for congestion, sinus pressure and sore throat  Eyes: Negative for pain and visual disturbance  Respiratory: Negative for cough, chest tightness, shortness of breath, wheezing and stridor  Cardiovascular: Negative for chest pain and palpitations  Gastrointestinal: Negative for abdominal distention, abdominal pain, constipation, diarrhea, nausea and vomiting  Genitourinary: Negative for dysuria and frequency  Musculoskeletal: Negative for neck pain and neck stiffness  Skin: Negative for rash  Neurological: Negative for dizziness, speech difficulty, light-headedness, numbness and headaches  Physical Exam  Physical Exam  Vitals signs reviewed  Constitutional:       General: She is in acute distress (Patient appears uncomfortable)  Appearance: She is well-developed  She is not diaphoretic  HENT:      Head: Normocephalic and atraumatic  Right Ear: External ear normal       Left Ear: External ear normal       Nose: Nose normal    Eyes:      General:         Right eye: No discharge  Left eye: No discharge  Pupils: Pupils are equal, round, and reactive to light  Neck:      Musculoskeletal: Normal range of motion and neck supple  Trachea: No tracheal deviation  Cardiovascular:      Rate and Rhythm: Normal rate and regular rhythm  Heart sounds: Normal heart sounds  No murmur  Pulmonary:      Effort: Pulmonary effort is normal  No respiratory distress  Breath sounds: Normal breath sounds  No stridor  Abdominal:      General: There is no distension  Palpations: Abdomen is soft  Tenderness: There is no abdominal tenderness  There is no guarding or rebound  Musculoskeletal: Normal range of motion  Skin:     General: Skin is warm and dry  Coloration: Skin is not pale  Findings: No erythema  Neurological:      General: No focal deficit present  Mental Status: She is alert and oriented to person, place, and time        Comments: Nonfocal neurological examination         Vital Signs  ED Triage Vitals   Temperature Pulse Respirations Blood Pressure SpO2   12/26/20 1746 12/26/20 1743 12/26/20 1743 12/26/20 1743 12/26/20 1743   98 2 °F (36 8 °C) 80 16 124/78 100 %      Temp Source Heart Rate Source Patient Position - Orthostatic VS BP Location FiO2 (%)   12/26/20 1743 12/26/20 1743 12/26/20 1743 12/26/20 1743 --   Oral Monitor Lying Right arm       Pain Score       12/26/20 1743       8           Vitals:    12/26/20 1743 12/26/20 1903 12/26/20 2110   BP: 124/78 123/68 113/61   Pulse: 80 69 76   Patient Position - Orthostatic VS: Lying Sitting Lying         Visual Acuity      ED Medications  Medications   heparin (porcine) 25,000 units in 0 45% NaCl 250 mL infusion (premix) (12 Units/kg/hr × 70 kg (Order-Specific) Intravenous New Bag 12/26/20 2250)   heparin (porcine) injection 4,000 Units (has no administration in time range)   heparin (porcine) injection 2,000 Units (has no administration in time range)   ketorolac (TORADOL) injection 15 mg (15 mg Intravenous Given 12/26/20 1911)   metoclopramide (REGLAN) injection 10 mg (10 mg Intravenous Given 12/26/20 1911)   diphenhydrAMINE (BENADRYL) injection 25 mg (25 mg Intravenous Given 12/26/20 1910)   magnesium sulfate 2 g/50 mL IVPB (premix) 2 g (0 g Intravenous Stopped 12/26/20 1946)   iohexol (OMNIPAQUE) 350 MG/ML injection (MULTI-DOSE) 85 mL (85 mL Intravenous Given 12/26/20 1936)   aspirin chewable tablet 81 mg (81 mg Oral Given 12/26/20 2224)   heparin (porcine) injection 4,000 Units (4,000 Units Intravenous Given 12/26/20 2250)       Diagnostic Studies  Results Reviewed     Procedure Component Value Units Date/Time    APTT six (6) hours after Heparin bolus/drip initiation or dosing change [[id number]] Collected: 12/26/20 2232    Lab Status: In process Specimen: Blood from Arm, Right Updated: 12/26/20 2235    Protime-INR [[id number]] Collected: 12/26/20 2232    Lab Status: In process Specimen: Blood from Arm, Right Updated: 12/26/20 2235    Mehama draw [[id number]] Collected: 12/26/20 1811    Lab Status: Final result Specimen: Blood from Arm, Right Updated: 12/26/20 2002    Narrative: The following orders were created for panel order Meh[place] draw    Procedure                               Abnormality         Status                     ---------                               -----------         ------                     Vale Dang Top on KYTQ[[id number]]                           Final result               Gold top on ZLVA[[id number]]                                 Final result Green / Black tube on hold[[id number]]                       Final result                 Please view results for these tests on the individual orders      Comprehensive metabolic panel [[id number]]  (Abnormal) Collected: 12/26/20 1811    Lab Status: Final result Specimen: Blood from Arm, Right Updated: 12/26/20 1846     Sodium 142 mmol/L      Potassium 4 1 mmol/L      Chloride 106 mmol/L      CO2 26 mmol/L      ANION GAP 10 mmol/L      BUN 11 mg/dL      Creatinine 0 92 mg/dL      Glucose 95 mg/dL      Calcium 9 0 mg/dL      AST 14 U/L      ALT 21 U/L      Alkaline Phosphatase 48 U/L      Total Protein 7 5 g/dL      Albumin 3 9 g/dL      Total Bilirubin 0 18 mg/dL      eGFR 76 ml/min/1 73sq m     Narrative:      [place] guidelines for Chronic Kidney Disease (CKD):     Stage 1 with normal or high GFR (GFR > 90 mL/min/1 73 square meters)    Stage 2 Mild CKD (GFR = 60-89 mL/min/1 73 square meters)    Stage 3A Moderate CKD (GFR = 45-59 mL/min/1 73 square meters)    Stage 3B Moderate CKD (GFR = 30-44 mL/min/1 73 square meters)    Stage 4 Severe CKD (GFR = 15-29 mL/min/1 73 square meters)    Stage 5 End Stage CKD (GFR <15 mL/min/1 73 square meters)  Note: GFR calculation is accurate only with a steady state creatinine    CBC and differential [[id number]]  (Abnormal) Collected: 12/26/20 1811    Lab Status: Final result Specimen: Blood from Arm, Right Updated: 12/26/20 1830     WBC 8 17 Thousand/uL      RBC 4 58 Million/uL      Hemoglobin 11 4 g/dL      Hematocrit 36 6 %      MCV 80 fL      MCH 24 9 pg      MCHC 31 1 g/dL      RDW 18 6 %      MPV 11 5 fL      Platelets 022 Thousands/uL      nRBC 0 /100 WBCs      Neutrophils Relative 58 %      Immat GRANS % 0 %      Lymphocytes Relative 31 %      Monocytes Relative 9 %      Eosinophils Relative 1 %      Basophils Relative 1 %      Neutrophils Absolute 4 72 Thousands/µL      Immature Grans Absolute 0 03 Thousand/uL      Lymphocytes Absolute 2 50 Thousands/µL      Monocytes Absolute 0 76 Thousand/µL      Eosinophils Absolute 0 08 Thousand/µL      Basophils Absolute 0 08 Thousands/µL                  CTA head and neck with and without contrast   Final Result by Tasia Fuller MD (12/26 2103)      High-grade stenosis terminating in occlusion at the C1 level of the right cervical ICA suggesting dissection  There is decreased flow distally in the right cervical and right cavernous ICA possibly from retrograde flow  No evidence of acute intracranial hemorrhage  I personally discussed this study with Lucinda Tenorio on 12/26/2020 at 8:37 PM                            Workstation performed: [id number]                    Procedures  CriticalCare Time  Performed by: Miya Monahan DO  Authorized by: Miya Monahan DO     Critical care provider statement:     Critical care time (minutes):  40    Critical care time was exclusive of:  Separately billable procedures and treating other patients    Critical care was necessary to treat or prevent imminent or life-threatening deterioration of the following conditions: Carotid artery dissection requiring heparin drip  Critical care was time spent personally by me on the following activities:  Obtaining history from patient or surrogate, development of treatment plan with patient or surrogate, discussions with consultants, evaluation of patient's response to treatment, examination of patient, ordering and performing treatments and interventions, ordering and review of laboratory studies, ordering and review of radiographic studies, re-evaluation of patient's condition and review of old charts             ED Course  ED Course as of Dec 26 2252   Sat Dec 26, 2020   2050 Discussed case with Radiology patient with internal carotid dissection, discussed this with the patient as well as surgical resident who is covering for vascular surgery        2215 Vascular surgery seen and evaluated the patient, requesting starting heparin, requesting starting      2241 Discussion with critical care, step-down level to admission  Will discussed case with Internal Medicine  MDM  Number of Diagnoses or Management Options  Acute nonintractable headache, unspecified headache type: new and requires workup  Dissection of carotid artery [facility]): new and requires workup  Diagnosis management comments:       Initial ED assessment:  43-year-old female, headache, these are new for her, did have chiropractic manipulation the day before this started      Initial DDx includes but is not limited to:   Migraine, vertebral artery dissection, tension headache, intracranial neoplasm    Initial ED plan:   Blood work, CTA head and neck, disposition pending ED workup, migraine cocktail        Final ED summary/disposition:   After evaluation and workup in the emergency department, found have internal carotid artery dissection, started on heparin drip, admitted to step-down level two        Amount and/or Complexity of Data Reviewed  Clinical lab tests: ordered and reviewed  Tests in the radiology section of CPT®: ordered and reviewed  Decide to obtain previous medical records or to obtain history from someone other than the patient: yes  Obtain history from someone other than the patient: yes  Review and summarize past medical records: yes  Discuss the patient with other providers: yes  Independent visualization of images, tracings, or specimens: yes        Disposition  Final diagnoses:   Dissection of carotid artery ([facility] [address] )   Acute nonintractable headache, unspecified headache type     Time reflects when diagnosis was documented in both MDM as applicable and the Disposition within this note     Time User Action Codes Description Comment    12/26/2020 10:48 PM Cherri De La Rosa Add [I77 71] Dissection of carotid artery ([facility] [address] )     12/26/2020 10:48 PM Cherri De La Rosa Add [R51 9] Acute nonintractable headache, unspecified headache type       ED Disposition     ED Disposition Condition Date/Time Comment    Admit Stable Sat Dec 26, 2020 10:48 PM Case was discussed with Critical care PA and Internal Medicine PA and surgery resident for vascular surgery and the patient's admission status was agreed to be Admission Status: inpatient status to the service of Dr Claudia Mathis   Follow-up Information    None         Patient's Medications   Discharge Prescriptions    No medications on file     No discharge procedures on file      PDMP Review     None          ED Provider  Electronically Signed by           Jonathan Jimenez DO  12/26/20 3536

## 2020-12-27 NOTE — UTILIZATION REVIEW
Initial Clinical Review    Admission: Date/Time/Statement:   Admission Orders (From admission, onward)     Ordered        12/26/20 2252  Inpatient Admission  Once                   Orders Placed This Encounter   Procedures    Inpatient Admission     Standing Status:   Standing     Number of Occurrences:   1     Order Specific Question:   Admitting Physician     Answer:   Compa Sher [[id number]]     Order Specific Question:   Level of Care     Answer:   Level 2 Stepdown / HOT [14]     Order Specific Question:   Estimated length of stay     Answer:   More than 2 Midnights     Order Specific Question:   Certification     Answer:   I certify that inpatient services are medically necessary for this patient for a duration of greater than two midnights  See H&P and MD Progress Notes for additional information about the patient's course of treatment  ED Arrival Information     Expected Arrival Acuity Means of Arrival Escorted By Service Admission Type    - 12/26/2020 17:00 Urgent Walk-In Family Member Hospitalist Urgent    Arrival Complaint    headaches         Chief Complaint   Patient presents with    Headache - New Onset or New Symptoms     Pt complains of a headache since monday  Pt states that she was here on thursday for the same and since then the headache has changes and gotten worse     Assessment/Plan: 42 yo  female presents to ED from home with HA since 12/21 after chiropractor adjustment 12/20, came to ED 12/24 for HA and was treated with migraine cocktail with neuro f/u recommended at the time  HA has  since worsened  On exam, no facial asymmetry or tongue deviation, strength 5/5   CTA ED suggests dissection R ICA  Pt given IV toradol, IV reglan, IV benedryl, IV mag, po ASA and Heparin bolus and heparin drip started in ED  Pt admitted as inpatient to level 2 stepdown with internal carotid artery dissection, acute intractable headache    Plan is for telemetry, neuro checks q1 h,continue IV heparin drip, neurology and vascular surgery consults and pain control  Vascular surgery- R ICA dissection/occlusion -recommend medical admission , no acute vascular intervention, continue heparin drip and ASA daily, continue neuro checks  12/27   Pt feels her HA has improved  Pt having her regular menses today, remains on heparin drip and ASA  Neuro checks WNL  Vascular surgery- No acute events overnight-Asymptomatic R ICA occlussion at the level of C1, almost cavernous portion of the ICA  No focal neurological deficits  Has intact [place] of sykes    -Neurosurgery-discussed case with neurology, asymptomatic high R ICA dissection and near occlusion w/o symptoms, Agree with AC and ASA  Will see pt as outpt in 1-2 wks with repeat CTA head and neck      ED Triage Vitals   Temperature Pulse Respirations Blood Pressure SpO2   12/26/20 1746 12/26/20 1743 12/26/20 1743 12/26/20 1743 12/26/20 1743   98 2 °F (36 8 °C) 80 16 124/78 100 %      Temp Source Heart Rate Source Patient Position - Orthostatic VS BP Location FiO2 (%)   12/26/20 1743 12/26/20 1743 12/26/20 1743 12/26/20 1743 --   Oral Monitor Lying Right arm       Pain Score       12/26/20 1743       8          Wt Readings from Last 1 Encounters:   12/26/20 74 8 kg (165 lb)     Additional Vital Signs:   Date/Time  Temp  Pulse  Resp  BP  MAP (mmHg)  SpO2  O2 Device  Patient Position - Orthostatic VS   12/27/20 1100  99 °F (37 2 °C)  67  18  111/55  75  100 %  None (Room air)  Lying   12/27/20 0747  98 4 °F (36 9 °C)  64  16  124/82    98 %  None (Room air)  Lying   12/27/20 0400              None (Room air)     12/27/20 0356  98 4 °F (36 9 °C)  70    107/53  77  100 %  None (Room air)  Lying   12/27/20 0200              None (Room air)     12/27/20 0143  98 4 °F (36 9 °C)  78    113/63  81  98 %  None (Room air)  Lying   12/27/20 0046    66  16  103/57    100 %       12/26/20 2324    71  16  126/66    99 %  None (Room air)  Lying   12/26/20 2110    76   113/61    99 %  None (Room air)  Lying     Date and Time Trauma Responsiveness Trauma Length of LOC (Seconds) R Pupil Size (mm) L Pupil Size (mm) R Pupil Reaction L Pupil Reaction   12/27/20 1000 -- -- 2 2 Brisk Brisk   12/27/20 0900 -- -- 2 2 Brisk Brisk   12/27/20 0800 -- -- 2 2 Brisk Brisk   12/27/20 0740 -- -- 2 2 Brisk Brisk   12/27/20 0600 -- -- 2 2 Brisk Brisk   12/27/20 0500 -- -- 2 2 Brisk Brisk   12/27/20 0400 -- -- 2 2 Brisk Brisk   12/27/20 0300 -- -- 2 2 Brisk Brisk   12/27/20 0200 -- -- 2 2 Brisk Brisk   12/27/20 0046 -- -- 2 2 Brisk Brisk   12/26/20 2347 -- -- 2 2 Brisk Brisk     Date and Time Eye Opening Best Verbal Response Best Motor Response [place] Coma Scale Score   12/27/20 1000 4 5 6 15   12/27/20 0900 4 5 6 15   12/27/20 0800 4 5 6 15   12/27/20 0600 4 5 6 15   12/27/20 0500 4 5 6 15   12/27/20 0400 4 5 6 15   12/27/20 0200 4 5 6 15   12/27/20 0046 4 5 6 15   12/26/20 2347 4 5 6 15   12/26/20 1838 4 5 6 15         Pertinent Labs/Diagnostic Test Results:   12/26  CTA head/neck  High-grade stenosis terminating in occlusion at the C1 level of the right cervical ICA suggesting dissection  There is decreased flow distally in the right cervical and right cavernous ICA possibly from retrograde flow    No evidence of acute intracranial hemorrhage        Results from last 7 days   Lab Units 12/27/20  0446 12/26/20  1811   WBC Thousand/uL 7 14 8 17   HEMOGLOBIN g/dL 11 3* 11 4*   HEMATOCRIT % 35 7 36 6   PLATELETS Thousands/uL 245 265   NEUTROS ABS Thousands/µL  --  4 72         Results from last 7 days   Lab Units 12/26/20  1811   SODIUM mmol/L 142   POTASSIUM mmol/L 4 1   CHLORIDE mmol/L 106   CO2 mmol/L 26   ANION GAP mmol/L 10   BUN mg/dL 11   CREATININE mg/dL 0 92   EGFR ml/min/1 73sq m 76   CALCIUM mg/dL 9 0     Results from last 7 days   Lab Units 12/26/20  1811   AST U/L 14   ALT U/L 21   ALK PHOS U/L 48   TOTAL PROTEIN g/dL 7 5   ALBUMIN g/dL 3 9   TOTAL BILIRUBIN mg/dL 0 18*         Results from last 7 days   Lab Units 12/26/20  1811   GLUCOSE RANDOM mg/dL 95           Results from last 7 days   Lab Units 12/27/20  1043 12/27/20  0446 12/26/20  2232   PROTIME seconds  --   --  13 8   INR   --   --  1 04   PTT seconds 53* 71* 25           Results from last 7 days   Lab Units 12/24/20  2131   CLARITY UA  Clear   COLOR UA  Yellow   SPEC GRAV UA  1 010   PH UA  5 5   GLUCOSE UA mg/dl Negative   KETONES UA mg/dl 15 (1+)*   BLOOD UA  Negative   PROTEIN UA mg/dl Negative   NITRITE UA  Negative   BILIRUBIN UA  Negative   UROBILINOGEN UA E U /dl 0 2   LEUKOCYTES UA  Negative         ED Treatment:   Medication Administration from 12/26/2020 1700 to 12/27/2020 0133       Date/Time Order Dose Route Action     12/26/2020 1911 ketorolac (TORADOL) injection 15 mg 15 mg Intravenous Given     12/26/2020 1911 metoclopramide (REGLAN) injection 10 mg 10 mg Intravenous Given     12/26/2020 1910 diphenhydrAMINE (BENADRYL) injection 25 mg 25 mg Intravenous Given     12/26/2020 1916 magnesium sulfate 2 g/50 mL IVPB (premix) 2 g 2 g Intravenous New Bag     12/26/2020 1936 iohexol (OMNIPAQUE) 350 MG/ML injection (MULTI-DOSE) 85 mL 85 mL Intravenous Given     12/26/2020 2224 aspirin chewable tablet 81 mg 81 mg Oral Given     12/26/2020 2250 heparin (porcine) injection 4,000 Units 4,000 Units Intravenous Given     12/26/2020 2250 heparin (porcine) 25,000 units in 0 45% NaCl 250 mL infusion (premix) 12 Units/kg/hr Intravenous New Bag        Past Medical History:   Diagnosis Date    Asthma     ACTIVITY INDUCED ASTHMA    IBS (irritable bowel syndrome)          Admitting Diagnosis: Dissection of carotid artery (HCC) [I77 71]  Internal carotid artery dissection (HCC) [I77 71]  Headache [R51 9]  Acute nonintractable headache, unspecified headache type [R51 9]  Age/Sex: 41 y o  female  Admission Orders:  Scheduled Medications:  aspirin, 81 mg, Oral, Daily      Continuous IV Infusions:  heparin (porcine), 3-20 Units/kg/hr (Order-Specific), Intravenous, Titrated      PRN Meds:  acetaminophen, 650 mg, Oral, Q6H PRN x1 12/27  heparin (porcine), 2,000 Units, Intravenous, Q1H PRN x1 12/27  heparin (porcine), 4,000 Units, Intravenous, Q1H PRN      Neuro checksq1 h  SCD's  Up w/ assist  Obtain PTT six (6) hours after Heparin bolus/drip initiation or dosing change, then every six (6  hours until 2 consecutive PTTs are therapeutic  All PTTs should be drawn peripherally  Do not use central lines unless ordered  When 2 consecutive PTTs are therapeutic (six hours apart), obtain PTT daily in AM   House diet    IP CONSULT TO VASCULAR SURGERY  IP CONSULT TO NEUROLOGY    Network Utilization Review Department  ATTENTION: Please call with any questions or concerns to [phone] and carefully listen to the prompts so that you are directed to the right person  All voicemails are confidential   Isabella Nicolas all requests for admission clinical reviews, approved or denied determinations and any other requests to dedicated fax number below belonging to the campus where the patient is receiving treatment   List of dedicated fax numbers for the Facilities:  [address] DENIALS (Administrative/Medical Necessity) [phone]   [address] (Maternity/NICU/Pediatrics) [phone]   [address] Mame Toro 6882 (Abdirashid Sanderson [facility] 103) [address] [place] [address] Gisell Price 14[address] [place] [address] [phone]

## 2020-12-27 NOTE — H&P
H&P- Shabbir Naidu 1975, 41 y o  female MRN: [id number]    Unit/Bed#: FT 04 Encounter: [id number]    Primary Care Provider: Tan Jaramillo MD   Date and time admitted to hospital: 12/26/2020  5:48 PM        * Internal carotid artery dissection [facility])  Assessment & Plan  · Presented with a persistent headache which began after chiropractic manipulation of her neck on 12/20/2020  · CTA head and neck: "High-grade stenosis terminating in occlusion at the C1 level of the right cervical ICA suggesting dissection  There is decreased flow distally in the right cervical and right cavernous ICA possibly from retrograde flow "  · Admit to step down level 2 for close monitoring  · Monitor neuro checks q1h and notify of any changes  · Vascular surgery consulted, no acute vascular intervention recommended at this time  · Started on IV heparin while in the ED which will be continued  · Aspirin 81 mg daily  · Neurology consulted  Acute nonintractable headache  Assessment & Plan  · Likely due to above  · Pain control  · Monitor neuro checks  Anemia  Assessment & Plan  · Hg 11 4 on admission  · Continue to monitor, repeat CBC in AM     VTE Prophylaxis: Heparin Drip  / sequential compression device   Code Status: level 1- full code  POLST: POLST is not applicable to this patient  Discussion with family: patient's ex- at bedside  Anticipated Length of Stay:  Patient will be admitted on an Inpatient basis with an anticipated length of stay of greater than 2 midnights  Justification for Hospital Stay: ICA dissection, need for IV heparin, close monitoring of neuro checks and neuro eval      Total Time for Visit, including Counseling / Coordination of Care: 1 hour  Greater than 50% of this total time spent on direct patient counseling and coordination of care  Chief Complaint:  headache    History of Present Illness:    Shabbir Naidu is a 41 y o  female who presents with headache   Patient reports that she went to her chiropractor on 12/20/2020 and had cervical manipulation and since then has had a posterior headache  She reports that on Wednesday she has an "energy manipulation" at yoga which improved her headache and reportedly did not involve any manipulation of her neck  Her headache returned the following day and she presented to her PCP where she was given a dose of Toradol and later that day presented to the ED for persistent headache where she received a migraine cocktail and reports noticing some improvement in her headache with this  She reports that yesterday her headache returned and she noticed that she was feeling "off balance " She notes that today her headache was persistent which prompted her to return to the hospital  She denies any vision changes, numbness/ weakness, speech difficulty or nausea/ vomiting  She reports that she does not get headaches often  She denies any recent fevers/ chills, cough, SOB or chest pain  She reports noticing some chest heaviness at this time which she states she "knows is anxiety "    Review of Systems:    Review of Systems   Constitutional: Negative for fever  Respiratory: Negative for shortness of breath  Cardiovascular: Negative for chest pain  Gastrointestinal: Negative for nausea and vomiting  Musculoskeletal: Positive for neck pain  Negative for back pain  Neurological: Positive for headaches  Negative for dizziness, speech difficulty, weakness and numbness  Psychiatric/Behavioral: The patient is nervous/anxious  All other systems reviewed and are negative        Past Medical and Surgical History:     Past Medical History:   Diagnosis Date    Asthma     ACTIVITY INDUCED ASTHMA    IBS (irritable bowel syndrome)        Past Surgical History:   Procedure Laterality Date    ABDOMINAL SURGERY      LAPROSCOPY FOR ENDOMETRIOSIS    CERVICAL BIOPSY  W/ LOOP ELECTRODE EXCISION      FINGER SURGERY      LEFT MIDDLE FINGER SURGERY 5 YRS AGO    TUBAL LIGATION         Meds/Allergies:    Prior to Admission medications    Medication Sig Start Date End Date Taking? Authorizing Provider   b complex vitamins tablet Take 1 tablet by mouth daily   Yes Historical Provider, MD   CHLOROPHYLL PO Take 1 Dose by mouth daily   Yes Historical Provider, MD   cholecalciferol (VITAMIN D3) 1,000 units tablet Take 1,000 Units by mouth daily   Yes Historical Provider, MD   LYSINE PO Take 1 tablet by mouth daily   Yes Historical Provider, MD   multivitamin (THERAGRAN) TABS Take 1 tablet by mouth daily   Yes Historical Provider, MD   NON FORMULARY Floradix Iron supplement   Yes Historical Provider, MD   Spirulina 500 MG TABS Take 1 tablet by mouth daily   Yes Historical Provider, MD   UNKNOWN TO PATIENT    Yes Historical Provider, MD   UNKNOWN TO PATIENT    Yes Historical Provider, MD   UNKNOWN TO PATIENT    Yes Historical Provider, MD   UNKNOWN TO PATIENT    Yes Historical Provider, MD   azithromycin (ZITHROMAX) 200 mg/5 mL suspension 364 mg (9 1 ml) by mouth daily for 4 days  Patient not taking: Reported on 12/26/2020 3/5/18   Han Thomas PA-C   ibuprofen (MOTRIN) 600 mg tablet Take 1 tablet by mouth every 8 (eight) hours as needed for mild pain or moderate pain (with food) for up to 7 days 12/8/16 12/15/16  Eva Hinojosa,    sucralfate (CARAFATE) 1 g/10 mL suspension Take 1 g by mouth daily    Historical Provider, MD   naproxen sodium (ANAPROX) 550 mg tablet Take 1 tablet (550 mg total) by mouth 2 (two) times a day with meals for 10 days 2/5/18 12/26/20  Rambo Garcia MD   omeprazole (PriLOSEC) 20 mg delayed release capsule Take 1 capsule (20 mg total) by mouth daily for 20 days 2/5/18 12/26/20  Rambo Garcia MD     I have reviewed home medications with patient personally  Allergies: Allergies   Allergen Reactions    Levaquin [Levofloxacin] Other (See Comments)     "nervous system reaction, anxiety for 2 months"      Metronidazole Hives    Other      "i took it for a yeast infection it started with the letter M"    Latex Rash       Social History:     Marital Status:    Patient Pre-hospital Living Situation: home  Patient Pre-hospital Level of Mobility: independent  Patient Pre-hospital Diet Restrictions: none  Substance Use History:   Social History     Substance and Sexual Activity   Alcohol Use Not Currently     Social History     Tobacco Use   Smoking Status Former Smoker   Smokeless Tobacco Never Used   Tobacco Comment    NO SMOKING IN 11 YEARS     Social History     Substance and Sexual Activity   Drug Use No       Family History:    History reviewed  No pertinent family history  Physical Exam:     Vitals:   Blood Pressure: 126/66 (12/26/20 2324)  Pulse: 71 (12/26/20 2324)  Temperature: 98 2 °F (36 8 °C) (12/26/20 1746)  Temp Source: Oral (12/26/20 1746)  Respirations: 16 (12/26/20 2324)  Height: 5' 4" (162 6 cm) (12/26/20 1743)  Weight - Scale: 74 8 kg (165 lb) (12/26/20 1743)  SpO2: 99 % (12/26/20 2324)    Physical Exam  Vitals signs and nursing note reviewed  Constitutional:       General: She is not in acute distress  Appearance: She is not ill-appearing or diaphoretic  HENT:      Head: Normocephalic and atraumatic  Eyes:      Extraocular Movements: Extraocular movements intact  Conjunctiva/sclera: Conjunctivae normal       Pupils: Pupils are equal, round, and reactive to light  Cardiovascular:      Rate and Rhythm: Normal rate and regular rhythm  Pulmonary:      Effort: Pulmonary effort is normal  No respiratory distress  Breath sounds: Normal breath sounds  Abdominal:      General: Bowel sounds are normal       Palpations: Abdomen is soft  Musculoskeletal:         General: No swelling or tenderness  Right lower leg: No edema  Left lower leg: No edema  Skin:     General: Skin is warm and dry  Neurological:      General: No focal deficit present        Mental Status: She is alert and oriented to person, place, and time  Sensory: No sensory deficit  Psychiatric:         Mood and Affect: Mood normal          Behavior: Behavior normal          Additional Data:     Lab Results: I have personally reviewed pertinent reports  Results from last 7 days   Lab Units 12/26/20  1811   WBC Thousand/uL 8 17   HEMOGLOBIN g/dL 11 4*   HEMATOCRIT % 36 6   PLATELETS Thousands/uL 265   NEUTROS PCT % 58   LYMPHS PCT % 31   MONOS PCT % 9   EOS PCT % 1     Results from last 7 days   Lab Units 12/26/20  1811   SODIUM mmol/L 142   POTASSIUM mmol/L 4 1   CHLORIDE mmol/L 106   CO2 mmol/L 26   BUN mg/dL 11   CREATININE mg/dL 0 92   ANION GAP mmol/L 10   CALCIUM mg/dL 9 0   ALBUMIN g/dL 3 9   TOTAL BILIRUBIN mg/dL 0 18*   ALK PHOS U/L 48   ALT U/L 21   AST U/L 14   GLUCOSE RANDOM mg/dL 95     Results from last 7 days   Lab Units 12/26/20  2232   INR  1 04                   Imaging: I have personally reviewed pertinent reports  CTA head and neck with and without contrast   Final Result by Salvador Snow MD (12/26 2103)      High-grade stenosis terminating in occlusion at the C1 level of the right cervical ICA suggesting dissection  There is decreased flow distally in the right cervical and right cavernous ICA possibly from retrograde flow  No evidence of acute intracranial hemorrhage  I personally discussed this study with Foster Salgado on 12/26/2020 at 8:37 PM                            Workstation performed: [id number]             [email or web address] / [email or web address] Records Reviewed: Yes     ** Please Note: This note has been constructed using a voice recognition system   **

## 2020-12-27 NOTE — ASSESSMENT & PLAN NOTE
· Presented with a persistent headache which began after chiropractic manipulation of her neck on 12/20/2020  · CTA head and neck: "High-grade stenosis terminating in occlusion at the C1 level of the right cervical ICA suggesting dissection  There is decreased flow distally in the right cervical and right cavernous ICA possibly from retrograde flow "  · Admit to step down level 2 for close monitoring  · Monitor neuro checks q1h and notify of any changes  · Vascular surgery consulted, no acute vascular intervention recommended at this time  · Started on IV heparin while in the ED which will be continued  · Aspirin 81 mg daily  · Neurology consulted

## 2020-12-27 NOTE — PROGRESS NOTES
Discussed with neurology  Asymptomatic high right carotid dissection and near occlusion without symptoms  Agree with AC and asa  Can see her in clinic in 1-2 weeks with repeat cta head and neck  Please call if develops any neurological change

## 2020-12-28 LAB
APTT PPP: 63 SECONDS (ref 23–37)
APTT PPP: 88 SECONDS (ref 23–37)
BASOPHILS # BLD AUTO: 0.05 THOUSANDS/ΜL (ref 0–0.1)
BASOPHILS NFR BLD AUTO: 1 % (ref 0–1)
EOSINOPHIL # BLD AUTO: 0.18 THOUSAND/ΜL (ref 0–0.61)
EOSINOPHIL NFR BLD AUTO: 2 % (ref 0–6)
ERYTHROCYTE [DISTWIDTH] IN BLOOD BY AUTOMATED COUNT: 18.5 % (ref 11.6–15.1)
HCT VFR BLD AUTO: 36.7 % (ref 34.8–46.1)
HGB BLD-MCNC: 11.6 G/DL (ref 11.5–15.4)
IMM GRANULOCYTES # BLD AUTO: 0.02 THOUSAND/UL (ref 0–0.2)
IMM GRANULOCYTES NFR BLD AUTO: 0 % (ref 0–2)
LYMPHOCYTES # BLD AUTO: 2.51 THOUSANDS/ΜL (ref 0.6–4.47)
LYMPHOCYTES NFR BLD AUTO: 33 % (ref 14–44)
MCH RBC QN AUTO: 25.5 PG (ref 26.8–34.3)
MCHC RBC AUTO-ENTMCNC: 31.6 G/DL (ref 31.4–37.4)
MCV RBC AUTO: 81 FL (ref 82–98)
MONOCYTES # BLD AUTO: 0.89 THOUSAND/ΜL (ref 0.17–1.22)
MONOCYTES NFR BLD AUTO: 12 % (ref 4–12)
NEUTROPHILS # BLD AUTO: 3.92 THOUSANDS/ΜL (ref 1.85–7.62)
NEUTS SEG NFR BLD AUTO: 52 % (ref 43–75)
NRBC BLD AUTO-RTO: 0 /100 WBCS
PLATELET # BLD AUTO: 249 THOUSANDS/UL (ref 149–390)
PMV BLD AUTO: 11 FL (ref 8.9–12.7)
RBC # BLD AUTO: 4.55 MILLION/UL (ref 3.81–5.12)
WBC # BLD AUTO: 7.57 THOUSAND/UL (ref 4.31–10.16)

## 2020-12-28 PROCEDURE — 85730 THROMBOPLASTIN TIME PARTIAL: CPT | Performed by: INTERNAL MEDICINE

## 2020-12-28 PROCEDURE — 85025 COMPLETE CBC W/AUTO DIFF WBC: CPT | Performed by: INTERNAL MEDICINE

## 2020-12-28 PROCEDURE — 99232 SBSQ HOSP IP/OBS MODERATE 35: CPT | Performed by: INTERNAL MEDICINE

## 2020-12-28 RX ORDER — ONDANSETRON 2 MG/ML
4 INJECTION INTRAMUSCULAR; INTRAVENOUS ONCE
Status: COMPLETED | OUTPATIENT
Start: 2020-12-28 | End: 2020-12-28

## 2020-12-28 RX ADMIN — ONDANSETRON 4 MG: 2 INJECTION INTRAMUSCULAR; INTRAVENOUS at 20:23

## 2020-12-28 RX ADMIN — ACETAMINOPHEN 650 MG: 325 TABLET, FILM COATED ORAL at 22:55

## 2020-12-28 RX ADMIN — ACETAMINOPHEN 650 MG: 325 TABLET, FILM COATED ORAL at 08:11

## 2020-12-28 RX ADMIN — ACETAMINOPHEN 650 MG: 325 TABLET, FILM COATED ORAL at 15:31

## 2020-12-28 RX ADMIN — ASPIRIN 81 MG: 81 TABLET ORAL at 08:12

## 2020-12-28 RX ADMIN — ACETAMINOPHEN 650 MG: 325 TABLET, FILM COATED ORAL at 01:05

## 2020-12-28 RX ADMIN — RIVAROXABAN 15 MG: 15 TABLET, FILM COATED ORAL at 16:34

## 2020-12-28 RX ADMIN — HEPARIN SODIUM 8.4 UNITS/KG/HR: 10000 INJECTION, SOLUTION INTRAVENOUS at 01:10

## 2020-12-28 NOTE — PROGRESS NOTES
Progress Note Lakeisha Hansen 1975, 41 y o  female MRN: [id number]    Unit/Bed#: S -01 Encounter: [id number]    Primary Care Provider: Mo Mcguire MD   Date and time admitted to hospital: 12/26/2020  5:48 PM    Acute nonintractable headache  Assessment & Plan  · Likely due to above  · Pain control  · Monitor neuro checks  Anemia  Assessment & Plan  · Resolved  · Hg 11 4 on admission  11 6 this AM  · Continue to monitor, repeat CBC in AM     * Internal carotid artery dissection [facility])  Assessment & Plan  · Presented with a persistent headache which began after chiropractic manipulation of her neck on 12/20/2020  · CTA head and neck: "High-grade stenosis terminating in occlusion at the C1 level of the right cervical ICA suggesting dissection  There is decreased flow distally in the right cervical and right cavernous ICA possibly from retrograde flow "  · Admit to step down level 2 for close monitoring  · Monitor neuro checks q4h and notify of any changes  · Vascular surgery consulted, no acute vascular intervention recommended at this time  · Neurosurgery consulted, no acute intervention recommended at this time, patient is to be seen in clinic in 1-2 weeks with repeat cta head and neck  · Started on IV heparin while in the ED which will be d/c and switched to Xarelto later today  · Aspirin 81 mg daily  · Neurology consulted appreciate reccomendations        VTE Pharmacologic Prophylaxis:   Pharmacologic: Heparin  Mechanical VTE Prophylaxis in Place: Yes    Discussions with Specialists or Other Care Team Provider: Neurology, Neurosurgery    Education and Discussions with Family / Patient:  Discussed plan with patient    Current Length of Stay: 2 day(s)    Current Patient Status: Inpatient     Discharge Plan / Estimated Discharge Date:  Tomorrow    Code Status: Level 1 - Full Code      Subjective:   Patient seen and examined  No acute events overnight    The patient reports that she has intermittent headache on the left side but her right-sided neck pressure is now resolved  The patient denies any nausea, vomiting, diarrhea, constipation, chest pain, shortness of breath, weakness  The patient reports that she did have a brief change in her vision in that her vision started to go dark and she felt like she was about to pass out while getting to go to the bathroom overnight  A 10 point review of systems was completed, unless noted otherwise all other systems are negative  Objective:     Vitals:   Temp (24hrs), Av 5 °F (36 9 °C), Min:98 1 °F (36 7 °C), Max:99 3 °F (37 4 °C)    Temp:  [98 1 °F (36 7 °C)-99 3 °F (37 4 °C)] 98 1 °F (36 7 °C)  HR:  [60-98] 62  Resp:  [16-18] 16  BP: (107-118)/(63-70) 107/64  SpO2:  [97 %-100 %] 98 %  Body mass index is 28 32 kg/m²  Input and Output Summary (last 24 hours): Intake/Output Summary (Last 24 hours) at 2020 1326  Last data filed at 2020 1101  Gross per 24 hour   Intake 1044 8 ml   Output 2300 ml   Net -1255 2 ml       Physical Exam:     Physical Exam  Vitals signs and nursing note reviewed  Constitutional:       General: She is not in acute distress  Appearance: Normal appearance  She is well-developed  She is not diaphoretic  HENT:      Head: Normocephalic and atraumatic  Nose: Nose normal    Eyes:      Extraocular Movements: Extraocular movements intact  Conjunctiva/sclera: Conjunctivae normal       Pupils: Pupils are equal, round, and reactive to light  Neck:      Musculoskeletal: Normal range of motion  Cardiovascular:      Rate and Rhythm: Normal rate and regular rhythm  Heart sounds: Normal heart sounds  No murmur  No friction rub  No gallop  Pulmonary:      Effort: Pulmonary effort is normal  No respiratory distress  Breath sounds: Normal breath sounds  Musculoskeletal: Normal range of motion  Skin:     General: Skin is warm and dry     Neurological:      General: No focal deficit present  Mental Status: She is alert and oriented to person, place, and time  Motor: No weakness  Psychiatric:         Behavior: Behavior normal          Thought Content: Thought content normal          Judgment: Judgment normal        Additional Data:     Labs: I have personally reviewed pertinent reports  Results from last 7 days   Lab Units 12/28/20  0140 12/27/20  0446 12/26/20  1811   WBC Thousand/uL 7 57 7 14 8 17   HEMOGLOBIN g/dL 11 6 11 3* 11 4*   HEMATOCRIT % 36 7 35 7 36 6   PLATELETS Thousands/uL 249 245 265   NEUTROS PCT % 52  --  58   MONOS PCT % 12  --  9      Results from last 7 days   Lab Units 12/26/20  1811   POTASSIUM mmol/L 4 1   CHLORIDE mmol/L 106   CO2 mmol/L 26   BUN mg/dL 11   CREATININE mg/dL 0 92   CALCIUM mg/dL 9 0   ALK PHOS U/L 48   ALT U/L 21   AST U/L 14              Results from last 7 days   Lab Units 12/28/20  0833 12/28/20  0140 12/27/20  1741  12/26/20  2232   INR   --   --   --   --  1 04   PTT seconds 63* 88* 93*   < > 25    < > = values in this interval not displayed  * Additional Pertinent Lab Tests Reviewed: [facility] Admission Reviewed    Radiology Results: I have personally reviewed pertinent reports  Cta Head And Neck With And Without Contrast    Result Date: 12/26/2020  Impression: High-grade stenosis terminating in occlusion at the C1 level of the right cervical ICA suggesting dissection  There is decreased flow distally in the right cervical and right cavernous ICA possibly from retrograde flow  No evidence of acute intracranial hemorrhage    I personally discussed this study with Ashleigh Duran on 12/26/2020 at 8:37 PM  Workstation performed: [id number]       Recent Cultures (last 7 days):           Last 24 Hours Medication List:   Current Facility-Administered Medications   Medication Dose Route Frequency Provider Last Rate    acetaminophen  650 mg Oral Q6H PRN Marko Peña PA-C      aspirin  81 mg Oral Daily Karina Turk PA-C      heparin (porcine)  3-20 Units/kg/hr (Order-Specific) Intravenous Titrated [facility] 12 Units/kg/hr (12/28/20 0755)    heparin (porcine)  2,000 Units Intravenous Q1H PRN [facility],       heparin (porcine)  4,000 Units Intravenous Q1H PRN [facility],       rivaroxaban  15 mg Oral Daily With [employer], DO          Today, Patient Was Seen By: [facility],     Portions of the record may have been created with voice recognition software  Occasional wrong word or "sound a like" substitutions may have occurred due to the inherent limitations of voice recognition software  Read the chart carefully and recognize, using context, where substitutions have occurred

## 2020-12-28 NOTE — PROGRESS NOTES
Patient laying comfortably in bed  Vitals WNL  Pt heparin drip stopped post xarelto dose as per order  All safety measures in place  Will continue monitor

## 2020-12-28 NOTE — ED PROVIDER NOTES
EMERGENCY MEDICINE NOTE        PATIENT IDENTIFICATION PHYSICIAN/SERVICE INFORMATION   Name: Unruly Andrade  MRN: [id number]  YOB: 1975  Age/Sex: 41 y o  female  Preferred Language: English  Code Status: Level 1 - Full Code  Encounter Date: 12/24/2020  Attending Physician: Deidra Cochran MD  Admitting Physician: No admitting provider for patient encounter  Primary Care Physician: Julián Johnson MD         Primary Care Phone: [phone] [facility]     Chief Complaint   Patient presents with    Headache     Pt reports left sided ha intermittently since Monday  Worse today  Pt was seen at PCP earlier  + dizziness and nausea when driving in the car only  Denies other complaints  H/O migraines  HISTORY OF PRESENT ILLNESS       Pt reports left sided ha intermittently since Monday  Worse today  Pt was seen at PCP earlier  + dizziness and nausea when driving in the car only  Denies other complaints  H/O migraines  Unruly Andrade is a 41 y o  female who presents due to Headache  Pt reports dull aching, left sided frontal/temporal headache onset Monday, intermittent and ongoing over the past 3-4 days with some improvement with Toradol shot given earlier today at PCP, though now recurrent and presenting to ED for reevaluation  Pt reports associated dizziness, imbalance, nausea made worse with driving, walking  Pt reports hx headaches, though sts dizziness is atypical feature--though notes that photophobia is consistent with her prior headaches  Pt reports that she did get adjusted earlier in the week, notes that this did not resolve any of her pain, though specifically asked about headache onset and states that this adjustment was after initial onset of symptoms  No other complaints at this time--friend present with her notes that no disordered speech/behavior/ambulation          History provided by:  Patient   used: No    Headache  Pain location:  L temporal and frontal  Quality:  Dull  Radiates to:  Does not radiate  Severity currently:  8/10  Severity at highest:  8/10  Onset quality:  Gradual  Timing:  Intermittent  Progression:  Waxing and waning  Chronicity:  New  Similar to prior headaches: no    Context: bright light    Context: not activity, not caffeine, not coughing, not defecating, not eating, not stress, not exposure to cold air, not intercourse, not loud noise and not straining    Relieved by:  Nothing  Worsened by:  Light  Ineffective treatments:  None tried  Associated symptoms: dizziness and photophobia    Associated symptoms: no abdominal pain, no back pain, no blurred vision, no congestion, no cough, no diarrhea, no drainage, no ear pain, no eye pain, no facial pain, no fatigue, no fever, no focal weakness, no hearing loss, no loss of balance, no myalgias, no nausea, no near-syncope, no neck pain, no neck stiffness, no numbness, no paresthesias, no seizures, no sinus pressure, no sore throat, no swollen glands, no syncope, no tingling, no URI, no visual change, no vomiting and no weakness    Risk factors: no anger, no family hx of SAH, does not have insomnia and lifestyle not sedentary          PAST MEDICAL AND SURGICAL HISTORY     Past Medical History:   Diagnosis Date    Asthma     ACTIVITY INDUCED ASTHMA    IBS (irritable bowel syndrome)        Past Surgical History:   Procedure Laterality Date    ABDOMINAL SURGERY      LAPROSCOPY FOR ENDOMETRIOSIS    CERVICAL BIOPSY  W/ LOOP ELECTRODE EXCISION      FINGER SURGERY      LEFT MIDDLE FINGER SURGERY 5 YRS AGO    TUBAL LIGATION         History reviewed  No pertinent family history      E-Cigarette/Vaping    E-Cigarette Use Never User      E-Cigarette/Vaping Substances     Social History     Tobacco Use    Smoking status: Former Smoker    Smokeless tobacco: Never Used    Tobacco comment: NO SMOKING IN 19 YEARS   Substance Use Topics    Alcohol use: Not Currently    Drug use: No         ALLERGIES Allergies   Allergen Reactions    Levaquin [Levofloxacin] Other (See Comments)     "nervous system reaction, anxiety for 2 months"   Metronidazole Hives    Other      "i took it for a yeast infection it started with the letter M"    Latex Rash         HOME MEDICATIONS     Prior to Admission Medications   Prescriptions Last Dose Informant Patient Reported? Taking? CHLOROPHYLL PO   Yes No   Sig: Take 1 Dose by mouth daily   LYSINE PO   Yes No   Sig: Take 1 tablet by mouth daily   Spirulina 500 MG TABS   Yes No   Sig: Take 1 tablet by mouth daily   UNKNOWN TO PATIENT   Yes No   UNKNOWN TO PATIENT   Yes No   UNKNOWN TO PATIENT   Yes No   UNKNOWN TO PATIENT   Yes No   azithromycin (ZITHROMAX) 200 mg/5 mL suspension   No No   Si mg (9 1 ml) by mouth daily for 4 days  Patient not taking: Reported on 2020   b complex vitamins tablet   Yes No   Sig: Take 1 tablet by mouth daily   ibuprofen (MOTRIN) 600 mg tablet   No No   Sig: Take 1 tablet by mouth every 8 (eight) hours as needed for mild pain or moderate pain (with food) for up to 7 days   multivitamin (THERAGRAN) TABS   Yes No   Sig: Take 1 tablet by mouth daily   sucralfate (CARAFATE) 1 g/10 mL suspension   Yes No   Sig: Take 1 g by mouth daily      Facility-Administered Medications: None         REVIEW OF SYSTEMS     Review of Systems   Constitutional: Negative for activity change, appetite change, chills, diaphoresis, fatigue and fever  HENT: Negative for congestion, ear discharge, ear pain, hearing loss, postnasal drip, sinus pressure and sore throat  Eyes: Positive for photophobia  Negative for blurred vision, pain and visual disturbance  Respiratory: Negative for cough and shortness of breath  Cardiovascular: Negative for chest pain, palpitations, syncope and near-syncope  Gastrointestinal: Negative for abdominal pain, diarrhea, nausea and vomiting     Musculoskeletal: Negative for back pain, myalgias, neck pain and neck stiffness  Skin: Negative for rash  Neurological: Positive for dizziness and headaches  Negative for tremors, focal weakness, seizures, syncope, facial asymmetry, speech difficulty, weakness, light-headedness, numbness, paresthesias and loss of balance  Psychiatric/Behavioral: The patient is not nervous/anxious  All other systems reviewed and are negative  PHYSICAL EXAMINATION     ED Triage Vitals   Temperature Pulse Respirations Blood Pressure SpO2   12/24/20 2108 12/24/20 2103 12/24/20 2103 12/24/20 2103 12/24/20 2103   98 4 °F (36 9 °C) 98 16 147/80 99 %      Temp Source Heart Rate Source Patient Position - Orthostatic VS BP Location FiO2 (%)   12/24/20 2108 12/24/20 2103 12/24/20 2103 12/24/20 2103 --   Oral Monitor Sitting Right arm       Pain Score       12/24/20 2103       9         Wt Readings from Last 3 Encounters:   12/26/20 74 8 kg (165 lb)   12/24/20 74 8 kg (165 lb)   04/21/18 74 6 kg (164 lb 7 4 oz)     Visual Acuity      Most Recent Value   L Pupil Size (mm)  3   R Pupil Size (mm)  3          Physical Exam  Vitals signs and nursing note reviewed  Constitutional:       General: She is not in acute distress  Appearance: She is well-developed  She is not ill-appearing, toxic-appearing or diaphoretic  HENT:      Head: Normocephalic and atraumatic  Mouth/Throat:      Mouth: Mucous membranes are moist    Eyes:      Conjunctiva/sclera: Conjunctivae normal       Pupils: Pupils are equal, round, and reactive to light  Neck:      Musculoskeletal: Normal range of motion and neck supple  Cardiovascular:      Rate and Rhythm: Normal rate and regular rhythm  Heart sounds: Normal heart sounds  No murmur  No friction rub  No gallop  Pulmonary:      Effort: Pulmonary effort is normal  No respiratory distress  Breath sounds: Normal breath sounds  No stridor  No wheezing, rhonchi or rales  Chest:      Chest wall: No tenderness  Musculoskeletal: Normal range of motion  Skin:     General: Skin is warm and dry  Capillary Refill: Capillary refill takes less than 2 seconds  Neurological:      Mental Status: She is alert and oriented to person, place, and time  She is not disoriented  GCS: GCS eye subscore is 4  GCS verbal subscore is 5  GCS motor subscore is 6  Cranial Nerves: No cranial nerve deficit  Sensory: No sensory deficit  Comments: Negative pronator drift  No facial droop noted  No abnormal speech, slurring, dysarthria or aphasia  No abnormal 2 point sensation  Negative Head Thrust    No focal deficits               DIAGNOSTIC RESULTS     Laboratory results:    Labs Reviewed   URINE MACROSCOPIC, POC - Abnormal       Result Value Ref Range Status    Color, UA Yellow   Final    Clarity, UA Clear   Final    pH, UA 5 5  4 5 - 8 0 Final    Leukocytes, UA Negative  Negative Final    Nitrite, UA Negative  Negative Final    Protein, UA Negative  Negative mg/dl Final    Glucose, UA Negative  Negative mg/dl Final    Ketones, UA 15 (1+) (*) Negative mg/dl Final    Urobilinogen, UA 0 2  0 2, 1 0 E U /dl E U /dl Final    Bilirubin, UA Negative  Negative Final    Blood, UA Negative  Negative Final    Specific [place], UA 1 010  1 003 - 1 030 Final    Narrative:     CLINITEK RESULT   POCT PREGNANCY, URINE - Normal    EXT PREG TEST UR (Ref: Negative) negative   Final    Control valid   Final       All labs reviewed and utilized in the medical decision making process    Radiology results:    No orders to display       All radiology studies independently viewed by me and interpreted by the radiologist       PROCEDURES     Procedures      Invasive Devices     None                 ASSESSMENT AND PLAN     MDM  Number of Diagnoses or Management Options  Migraine: new, no workup     Amount and/or Complexity of Data Reviewed  Review and summarize past medical records: yes    Risk of Complications, Morbidity, and/or Mortality  Presenting problems: moderate  Diagnostic procedures: moderate  Management options: moderate    Patient Progress  Patient progress: improved      Initial ED assessment:  Katlin Moraes is a 41 y o  female who presents with Headache  Vitals signs reviewed and WNL  Physical examination is unremarkable    Initial Ddx  includes but is not limited to:   Migraine headache, atypical migraine headache, tension headache, cluster headache; doubt ICH, SAH, tumor, meningitis, temporal arteritis, vertebral dissection, carbon monoxide poisoning, zoster, sinusitis  Initial ED plan:   Plan will be to perform treat symptomatically  Final ED summary/disposition: Ambulated patient throughout department, asked about symptoms of dizziness/imbalance--pt verbalizes pain, dizziness much improved  Discussed results of diagnostic testing with Patient and Friend with Permission in detail  Greater than 10 minutes of education regarding diagnosis, testing, and ample opportunity for questions  Home care recommendations given with discharge paperwork  Return to ED instructions given if new/worsening sxs  Verbalized understanding      MDM  Reviewed: previous chart, nursing note and vitals          ED COURSE OF CARE AND REASSESSMENT                                          Medications   sodium chloride 0 9 % bolus 1,000 mL (0 mL Intravenous Stopped 12/24/20 2331)   dexamethasone (DECADRON) injection 10 mg (10 mg Intravenous Given 12/24/20 2144)   metoclopramide (REGLAN) injection 10 mg (10 mg Intravenous Given 12/24/20 2145)   diphenhydrAMINE (BENADRYL) injection 25 mg (25 mg Intravenous Given 12/24/20 2145)   cyclobenzaprine (FLEXERIL) tablet 10 mg (10 mg Oral Given 12/24/20 2212)   magnesium sulfate 2 g/50 mL IVPB (premix) 2 g (0 g Intravenous Stopped 12/24/20 2307)         FINAL IMPRESSION     Final diagnoses:   Migraine         DISPOSITION AND PLANNING     Time reflects when diagnosis was documented in both MDM as applicable and the Disposition within this note     Time User Action Codes Description Comment    12/24/2020 11:20 PM Julita Branch Add [J33 [id number]] Migraine       ED Disposition     ED Disposition Condition Date/Time Comment    Discharge Stable Thu Dec 24, 2020 11:20 PM Jai Mckenzie discharge to home/self care              Follow-up Information     Follow up With Specialties Details Why Contact Info Additional Information    [facility] Call in 1 day For follow up [address] [facility] [address] [facility], [address] [place] [address]    Kimberley Padilla MD  Call in 1 day For follow up [facility] [phone]       [address] Emergency Department Emergency Medicine Go to  If symptoms worsen [address] [address]  [phone] AN ED, [address], [place], [place], [facility]  [address]  [place]  Call in 1 day  For follow up    Kimberley Padilla MD  [address]  [facility]  [address] [id number] 1732    Call in 1 day  For follow up    [place] [address]  [phone]  Go to   If symptoms worsen        DISCHARGE MEDICATIONS     Discharge Medication List as of 12/24/2020 11:20 PM      CONTINUE these medications which have NOT CHANGED    Details   b complex vitamins tablet Take 1 tablet by mouth daily, Until Discontinued, Historical Med      CHLOROPHYLL PO Take 1 Dose by mouth daily, Until Discontinued, Historical Med      LYSINE PO Take 1 tablet by mouth daily, Until Discontinued, Historical Med      multivitamin (THERAGRAN) TABS Take 1 tablet by mouth daily, Until Discontinued, Historical Med      Spirulina 500 MG TABS Take 1 tablet by mouth daily, Until Discontinued, Historical Med      !! UNKNOWN TO PATIENT Until Discontinued, Historical Med      !! UNKNOWN TO PATIENT Until Discontinued, Historical Med      !! UNKNOWN TO PATIENT Until Discontinued, Historical Med      !! UNKNOWN TO PATIENT Until Discontinued, Historical Med      azithromycin (ZITHROMAX) 200 mg/5 mL suspension 364 mg (9 1 ml) by mouth daily for 4 days  , Print      ibuprofen (MOTRIN) 600 mg tablet Take 1 tablet by mouth every 8 (eight) hours as needed for mild pain or moderate pain (with food) for up to 7 days, Starting 12/8/2016, Until Thu 12/15/16, Print      sucralfate (CARAFATE) 1 g/10 mL suspension Take 1 g by mouth daily, Historical Med      naproxen sodium (ANAPROX) 550 mg tablet Take 1 tablet (550 mg total) by mouth 2 (two) times a day with meals for 10 days, Starting Mon 2/5/2018, Until u 2/15/2018, Print      omeprazole (PriLOSEC) 20 mg delayed release capsule Take 1 capsule (20 mg total) by mouth daily for 20 days, Starting Mon 2/5/2018, Until Sun 2/25/2018, Print       !! - Potential duplicate medications found  Please discuss with provider  No discharge procedures on file      PDMP Review     None          MAXINE Anderson PA-C  12/27/20 1932

## 2020-12-28 NOTE — ASSESSMENT & PLAN NOTE
· Presented with a persistent headache which began after chiropractic manipulation of her neck on 12/20/2020  · CTA head and neck: "High-grade stenosis terminating in occlusion at the C1 level of the right cervical ICA suggesting dissection  There is decreased flow distally in the right cervical and right cavernous ICA possibly from retrograde flow "  · Admit to step down level 2 for close monitoring  · Monitor neuro checks q4h and notify of any changes  · Vascular surgery consulted, no acute vascular intervention recommended at this time  · Neurosurgery consulted, no acute intervention recommended at this time, patient is to be seen in clinic in 1-2 weeks with repeat cta head and neck  · Started on IV heparin while in the ED which will be d/c and switched to Xarelto later today  · Aspirin 81 mg daily     · Neurology consulted appreciate reccomendations

## 2020-12-28 NOTE — PROGRESS NOTES
Pt's neuro checks and vital signs stable  However, pt reported feeling dizziness/shakiness  SLIM notified  No new orders  Will continue to monitor

## 2020-12-29 ENCOUNTER — TELEPHONE (OUTPATIENT)
Dept: NEUROSURGERY | Facility: CLINIC | Age: 45
End: 2020-12-29

## 2020-12-29 LAB
ANION GAP SERPL CALCULATED.3IONS-SCNC: 7 MMOL/L (ref 4–13)
BUN SERPL-MCNC: 7 MG/DL (ref 5–25)
CALCIUM SERPL-MCNC: 9.3 MG/DL (ref 8.3–10.1)
CHLORIDE SERPL-SCNC: 104 MMOL/L (ref 100–108)
CO2 SERPL-SCNC: 26 MMOL/L (ref 21–32)
CREAT SERPL-MCNC: 0.88 MG/DL (ref 0.6–1.3)
ERYTHROCYTE [DISTWIDTH] IN BLOOD BY AUTOMATED COUNT: 18.6 % (ref 11.6–15.1)
GFR SERPL CREATININE-BSD FRML MDRD: 80 ML/MIN/1.73SQ M
GLUCOSE SERPL-MCNC: 90 MG/DL (ref 65–140)
HCT VFR BLD AUTO: 37.1 % (ref 34.8–46.1)
HGB BLD-MCNC: 11.6 G/DL (ref 11.5–15.4)
MCH RBC QN AUTO: 24.9 PG (ref 26.8–34.3)
MCHC RBC AUTO-ENTMCNC: 31.3 G/DL (ref 31.4–37.4)
MCV RBC AUTO: 80 FL (ref 82–98)
PLATELET # BLD AUTO: 267 THOUSANDS/UL (ref 149–390)
PMV BLD AUTO: 11.3 FL (ref 8.9–12.7)
POTASSIUM SERPL-SCNC: 4.1 MMOL/L (ref 3.5–5.3)
RBC # BLD AUTO: 4.65 MILLION/UL (ref 3.81–5.12)
SODIUM SERPL-SCNC: 137 MMOL/L (ref 136–145)
WBC # BLD AUTO: 6.41 THOUSAND/UL (ref 4.31–10.16)

## 2020-12-29 PROCEDURE — 99232 SBSQ HOSP IP/OBS MODERATE 35: CPT | Performed by: HOSPITALIST

## 2020-12-29 PROCEDURE — 85027 COMPLETE CBC AUTOMATED: CPT | Performed by: PSYCHIATRY & NEUROLOGY

## 2020-12-29 PROCEDURE — 80048 BASIC METABOLIC PNL TOTAL CA: CPT | Performed by: PSYCHIATRY & NEUROLOGY

## 2020-12-29 RX ADMIN — RIVAROXABAN 15 MG: 15 TABLET, FILM COATED ORAL at 16:20

## 2020-12-29 RX ADMIN — ACETAMINOPHEN 650 MG: 325 TABLET, FILM COATED ORAL at 16:20

## 2020-12-29 RX ADMIN — ACETAMINOPHEN 650 MG: 325 TABLET, FILM COATED ORAL at 05:29

## 2020-12-29 RX ADMIN — IRON SUCROSE 200 MG: 20 INJECTION, SOLUTION INTRAVENOUS at 10:03

## 2020-12-29 RX ADMIN — ASPIRIN 81 MG: 81 TABLET ORAL at 07:51

## 2020-12-29 NOTE — PROGRESS NOTES
Progress Note [place] [address] 1975, 41 y o  female MRN: [id number]    Unit/Bed#: S -01 Encounter: [id number]    Primary Care Provider: Renee Borrero MD   Date and time admitted to hospital: 12/26/2020  5:48 PM    Acute nonintractable headache  Assessment & Plan  · Likely due to above  · Pain control  · Monitor neuro checks  Anemia  Assessment & Plan  · Resolved  · Hg 11 4 on admission  · Iron given this AM  · Continue to monitor, repeat CBC in AM     * Internal carotid artery dissection [facility])  Assessment & Plan  · Presented with a persistent headache which began after chiropractic manipulation of her neck on 12/20/2020  · CTA head and neck: "High-grade stenosis terminating in occlusion at the C1 level of the right cervical ICA suggesting dissection  There is decreased flow distally in the right cervical and right cavernous ICA possibly from retrograde flow "  · Admit to step down level 2 for close monitoring  · Monitor neuro checks q4h and notify of any changes  · Vascular surgery consulted, no acute vascular intervention recommended at this time  · Neurosurgery consulted, no acute intervention recommended at this time, patient is to be seen in clinic in 1-2 weeks with repeat cta head and neck  · Started on IV heparin while in the ED which will be d/c and switched to Xarelto later today  · Aspirin 81 mg daily  · Neurology consulted appreciate reccomendations        VTE Pharmacologic Prophylaxis:   Pharmacologic: Rivaroxaban (Xarelto)  Mechanical VTE Prophylaxis in Place: Yes    Discussions with Specialists or Other Care Team Provider:  Neurology, neurosurgery    Education and Discussions with Family / Patient:  Discussed plan with patient    Current Length of Stay: 3 day(s)    Current Patient Status: Inpatient     Discharge Plan / Estimated Discharge Date:  Tomorrow    Code Status: Level 1 - Full Code      Subjective:   Patient seen and examined  No acute events overnight    The patient reports that she is improving and that her headache is significantly better, however she reports that she had is palpitations while getting up and is dizzy while standing  The patient was placed back on telemetry however as of this note no events have been noted on telemetry monitoring  The patient denied any weakness nausea vomiting loss of consciousness  A 10 point review of systems was completed, unless noted otherwise all other systems are negative  Objective:     Vitals:   Temp (24hrs), Av 9 °F (36 6 °C), Min:97 7 °F (36 5 °C), Max:98 2 °F (36 8 °C)    Temp:  [97 7 °F (36 5 °C)-98 2 °F (36 8 °C)] 97 7 °F (36 5 °C)  HR:  [71-86] 86  Resp:  [18] 18  BP: (101-114)/(59-60) 111/60  SpO2:  [95 %-99 %] 99 %  Body mass index is 28 32 kg/m²  Input and Output Summary (last 24 hours): Intake/Output Summary (Last 24 hours) at 2020 1605  Last data filed at 2020 1452  Gross per 24 hour   Intake 730 ml   Output 2950 ml   Net -2220 ml       Physical Exam:     Physical Exam  Vitals signs and nursing note reviewed  Constitutional:       General: She is not in acute distress  Appearance: Normal appearance  She is well-developed  She is not diaphoretic  HENT:      Head: Normocephalic and atraumatic  Nose: Nose normal    Eyes:      Extraocular Movements: Extraocular movements intact  Conjunctiva/sclera: Conjunctivae normal       Pupils: Pupils are equal, round, and reactive to light  Neck:      Musculoskeletal: Normal range of motion  Cardiovascular:      Rate and Rhythm: Normal rate and regular rhythm  Heart sounds: Normal heart sounds  No murmur  No friction rub  No gallop  Pulmonary:      Effort: Pulmonary effort is normal  No respiratory distress  Breath sounds: Normal breath sounds  Musculoskeletal: Normal range of motion  Skin:     General: Skin is warm and dry  Neurological:      General: No focal deficit present        Mental Status: She is alert and oriented to person, place, and time  Motor: No weakness  Psychiatric:         Behavior: Behavior normal          Thought Content: Thought content normal          Judgment: Judgment normal      Additional Data:     Labs: I have personally reviewed pertinent reports  Results from last 7 days   Lab Units 12/29/20  0519 12/28/20  0140 12/27/20  0446 12/26/20  1811   WBC Thousand/uL 6 41 7 57 7 14 8 17   HEMOGLOBIN g/dL 11 6 11 6 11 3* 11 4*   HEMATOCRIT % 37 1 36 7 35 7 36 6   PLATELETS Thousands/uL 267 249 245 265   NEUTROS PCT %  --  52  --  58   MONOS PCT %  --  12  --  9      Results from last 7 days   Lab Units 12/29/20  0519 12/26/20  1811   POTASSIUM mmol/L 4 1 4 1   CHLORIDE mmol/L 104 106   CO2 mmol/L 26 26   BUN mg/dL 7 11   CREATININE mg/dL 0 88 0 92   CALCIUM mg/dL 9 3 9 0   ALK PHOS U/L  --  48   ALT U/L  --  21   AST U/L  --  14              Results from last 7 days   Lab Units 12/28/20  0833 12/28/20  0140 12/27/20  1741  12/26/20  2232   INR   --   --   --   --  1 04   PTT seconds 63* 88* 93*   < > 25    < > = values in this interval not displayed  * Additional Pertinent Lab Tests Reviewed: Julio César 67 Admission Reviewed    Radiology Results: I have personally reviewed pertinent reports  Cta Head And Neck With And Without Contrast    Result Date: 12/26/2020  Impression: High-grade stenosis terminating in occlusion at the C1 level of the right cervical ICA suggesting dissection  There is decreased flow distally in the right cervical and right cavernous ICA possibly from retrograde flow  No evidence of acute intracranial hemorrhage    I personally discussed this study with Arturo Coley on 12/26/2020 at 8:37 PM  Workstation performed: [id number]       Recent Cultures (last 7 days):           Last 24 Hours Medication List:   Current Facility-Administered Medications   Medication Dose Route Frequency Provider Last Rate    acetaminophen  650 mg Oral Q6H PRN Emiliaan Webster PA-C      aspirin  81 mg Oral Daily Emiliana Webster PA-C      rivaroxaban  15 mg Oral Daily With [facility] Diagnostics[facility]          Today, Patient Was Seen By: Paris Humphrey DO    Portions of the record may have been created with voice recognition software  Occasional wrong word or "sound a like" substitutions may have occurred due to the inherent limitations of voice recognition software  Read the chart carefully and recognize, using context, where substitutions have occurred

## 2020-12-29 NOTE — ASSESSMENT & PLAN NOTE
· Resolved  · Hg 11 4 on admission    · Iron given this AM  · Continue to monitor, repeat CBC in AM

## 2020-12-29 NOTE — PLAN OF CARE
Problem: PAIN - ADULT  Goal: Verbalizes/displays adequate comfort level or baseline comfort level  Description: Interventions:  - Encourage patient to monitor pain and request assistance  - Assess pain using appropriate pain scale  - Administer analgesics based on type and severity of pain and evaluate response  - Implement non-pharmacological measures as appropriate and evaluate response  - Consider cultural and social influences on pain and pain management  - Notify physician/advanced practitioner if interventions unsuccessful or patient reports new pain  Outcome: Progressing     Problem: INFECTION - ADULT  Goal: Absence or prevention of progression during hospitalization  Description: INTERVENTIONS:  - Assess and monitor for signs and symptoms of infection  - Monitor lab/diagnostic results  - Monitor all insertion sites, i e  indwelling lines, tubes, and drains  - Monitor endotracheal if appropriate and nasal secretions for changes in amount and color  - [place] appropriate cooling/warming therapies per order  - Administer medications as ordered  - Instruct and encourage patient and family to use good hand hygiene technique  - Identify and instruct in appropriate isolation precautions for identified infection/condition  Outcome: Progressing  Goal: Absence of fever/infection during neutropenic period  Description: INTERVENTIONS:  - Monitor WBC    Outcome: Progressing     Problem: SAFETY ADULT  Goal: Patient will remain free of falls  Description: INTERVENTIONS:  - Assess patient frequently for physical needs  -  Identify cognitive and physical deficits and behaviors that affect risk of falls    -  [place] fall precautions as indicated by assessment   - Educate patient/family on patient safety including physical limitations  - Instruct patient to call for assistance with activity based on assessment  - Modify environment to reduce risk of injury  - Consider OT/PT consult to assist with strengthening/mobility  Outcome: Progressing  Goal: Maintain or return to baseline ADL function  Description: INTERVENTIONS:  -  Assess patient's ability to carry out ADLs; assess patient's baseline for ADL function and identify physical deficits which impact ability to perform ADLs (bathing, care of mouth/teeth, toileting, grooming, dressing, etc )  - Assess/evaluate cause of self-care deficits   - Assess range of motion  - Assess patient's mobility; develop plan if impaired  - Assess patient's need for assistive devices and provide as appropriate  - Encourage maximum independence but intervene and supervise when necessary  - Involve family in performance of ADLs  - Assess for home care needs following discharge   - Consider OT consult to assist with ADL evaluation and planning for discharge  - Provide patient education as appropriate  Outcome: Progressing  Goal: Maintain or return mobility status to optimal level  Description: INTERVENTIONS:  - Assess patient's baseline mobility status (ambulation, transfers, stairs, etc )    - Identify cognitive and physical deficits and behaviors that affect mobility  - Identify mobility aids required to assist with transfers and/or ambulation (gait belt, sit-to-stand, lift, walker, cane, etc )  - [place] fall precautions as indicated by assessment  - Record patient progress and toleration of activity level on Mobility SBAR; progress patient to next Phase/Stage  - Instruct patient to call for assistance with activity based on assessment  - Consider rehabilitation consult to assist with strengthening/weightbearing, etc   Outcome: Progressing     Problem: DISCHARGE PLANNING  Goal: Discharge to home or other facility with appropriate resources  Description: INTERVENTIONS:  - Identify barriers to discharge w/patient and caregiver  - Arrange for needed discharge resources and transportation as appropriate  - Identify discharge learning needs (meds, wound care, etc )  - Arrange for interpretive services to assist at discharge as needed  - Refer to Case Management Department for coordinating discharge planning if the patient needs post-hospital services based on physician/advanced practitioner order or complex needs related to functional status, cognitive ability, or social support system  Outcome: Progressing     Problem: Knowledge Deficit  Goal: Patient/family/caregiver demonstrates understanding of disease process, treatment plan, medications, and discharge instructions  Description: Complete learning assessment and assess knowledge base    Interventions:  - Provide teaching at level of understanding  - Provide teaching via preferred learning methods  Outcome: Progressing

## 2020-12-30 VITALS
WEIGHT: 165 LBS | OXYGEN SATURATION: 95 % | HEIGHT: 64 IN | RESPIRATION RATE: 18 BRPM | SYSTOLIC BLOOD PRESSURE: 100 MMHG | DIASTOLIC BLOOD PRESSURE: 63 MMHG | TEMPERATURE: 98 F | HEART RATE: 86 BPM | BODY MASS INDEX: 28.17 KG/M2

## 2020-12-30 LAB
ANION GAP SERPL CALCULATED.3IONS-SCNC: 9 MMOL/L (ref 4–13)
BUN SERPL-MCNC: 10 MG/DL (ref 5–25)
CALCIUM SERPL-MCNC: 9.1 MG/DL (ref 8.3–10.1)
CHLORIDE SERPL-SCNC: 103 MMOL/L (ref 100–108)
CO2 SERPL-SCNC: 23 MMOL/L (ref 21–32)
CREAT SERPL-MCNC: 0.75 MG/DL (ref 0.6–1.3)
ERYTHROCYTE [DISTWIDTH] IN BLOOD BY AUTOMATED COUNT: 18.6 % (ref 11.6–15.1)
GFR SERPL CREATININE-BSD FRML MDRD: 97 ML/MIN/1.73SQ M
GLUCOSE SERPL-MCNC: 85 MG/DL (ref 65–140)
HCT VFR BLD AUTO: 38.4 % (ref 34.8–46.1)
HGB BLD-MCNC: 12.1 G/DL (ref 11.5–15.4)
MCH RBC QN AUTO: 25.2 PG (ref 26.8–34.3)
MCHC RBC AUTO-ENTMCNC: 31.5 G/DL (ref 31.4–37.4)
MCV RBC AUTO: 80 FL (ref 82–98)
PLATELET # BLD AUTO: 263 THOUSANDS/UL (ref 149–390)
PMV BLD AUTO: 11.6 FL (ref 8.9–12.7)
POTASSIUM SERPL-SCNC: 4.3 MMOL/L (ref 3.5–5.3)
RBC # BLD AUTO: 4.8 MILLION/UL (ref 3.81–5.12)
SODIUM SERPL-SCNC: 135 MMOL/L (ref 136–145)
WBC # BLD AUTO: 6.6 THOUSAND/UL (ref 4.31–10.16)

## 2020-12-30 PROCEDURE — 97116 GAIT TRAINING THERAPY: CPT

## 2020-12-30 PROCEDURE — 85027 COMPLETE CBC AUTOMATED: CPT | Performed by: PSYCHIATRY & NEUROLOGY

## 2020-12-30 PROCEDURE — 80048 BASIC METABOLIC PNL TOTAL CA: CPT | Performed by: PSYCHIATRY & NEUROLOGY

## 2020-12-30 PROCEDURE — 97163 PT EVAL HIGH COMPLEX 45 MIN: CPT

## 2020-12-30 PROCEDURE — 99239 HOSP IP/OBS DSCHRG MGMT >30: CPT | Performed by: HOSPITALIST

## 2020-12-30 PROCEDURE — 97166 OT EVAL MOD COMPLEX 45 MIN: CPT

## 2020-12-30 RX ORDER — ASPIRIN 81 MG/1
81 TABLET ORAL DAILY
Qty: 30 TABLET | Refills: 0 | Status: SHIPPED | OUTPATIENT
Start: 2020-12-31 | End: 2021-04-05

## 2020-12-30 RX ADMIN — ASPIRIN 81 MG: 81 TABLET ORAL at 08:52

## 2020-12-30 NOTE — OCCUPATIONAL THERAPY NOTE
Occupational Therapy Evaluation     Patient Name: Jai Mckenzie  [id number] Date: 12/30/2020  Problem List  Principal Problem:    Internal carotid artery dissection [facility])  Active Problems:    Anemia    Acute nonintractable headache    Past Medical History  Past Medical History:   Diagnosis Date    Asthma     ACTIVITY INDUCED ASTHMA    IBS (irritable bowel syndrome)      Past Surgical History  Past Surgical History:   Procedure Laterality Date    ABDOMINAL SURGERY      LAPROSCOPY FOR ENDOMETRIOSIS    CERVICAL BIOPSY  W/ LOOP ELECTRODE EXCISION      FINGER SURGERY      LEFT MIDDLE FINGER SURGERY 5 YRS AGO    TUBAL LIGATION          12/30/20 1047   OT Last Visit   OT Visit Date 12/30/20  (Wednesday)   Note Type   Note type Evaluation   Restrictions/Precautions   Weight Bearing Precautions Per Order No   Pain Assessment   Pain Assessment Tool Pain Assessment not indicated - pt denies pain   Pain Score No Pain   Home Living   Type of [address] [place] [address] Two level  (4-5 KAYCEE; laundry up flight of stairs)   Bathroom Shower/Tub Tub/shower unit   Bathroom Toilet Standard   Bathroom Equipment   (no DME)   [address] Other (Comment)  (no DME or AD)   Additional Comments Pt reports living w/ 3 teenage children in 2  w/ first floor bedroom, bathroom    Prior Function   Level of [place] Independent with ADLs and functional mobility   Lives With Family  (3 teenage children)   ADL Assistance Independent   IADLs Independent   Falls in the last 6 months 0   Vocational Full time employment   Comments Pt reports I and active lifestyle PTA   Lifestyle   Autonomy Pt reports I w/ ADL/ IADL PTA including working, managing household, and driving   Reciprocal Relationships Pt reports living w/ 3 teenage children (15,16, and 23)   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    Pt added also in school studying accupuncture   Intrinsic Gratification Pt reports enjoying yoga, school, and art  Active lfestyle   Subjective   Subjective "I am feeling a little better today aftter the iron they gave me yesterday"   ADL   Where Assessed Edge of bed  (vs bathroom)   Eating Assistance 6  Modified independent   Eating Deficit Setup   Grooming Assistance 5  Supervision/Setup   Grooming Deficit Setup; Increased time to complete;Standing with assistive device;Verbal cueing  (cues for walker mgmt standing at sink)   UB Bathing Assistance Unable to assess  (anticipate S, + time seated based on fxal obs skills)   LB Bathing Assistance Unable to assess  (anticipate S, + time seated based on fxal obs skills)   UB Dressing Assistance 6  Modified independent   UB Dressing Deficit Setup; Increased time to complete;Verbal cueing   LB Dressing Assistance 5  Supervision/Setup   LB Dressing Deficit Setup;Supervision/safety; Increased time to complete;Verbal cueing; Requires assistive device for steadying   Toileting Assistance  5  Supervision/Setup   Toileting Deficit Setup; Increased time to complete;Verbal cueing;Supervison/safety;Grab bar use   Additional Comments + time to complete ADL  Cues / instruction for tech to compensate for symptoms   Bed Mobility   Supine to Sit 5  Supervision   Additional items Assist x 1;HOB elevated; Increased time required   Sit to Supine Unable to assess   Additional Comments Pt seated OOB in chair post eval w/ needs met, call bell in reach    Transfers   Sit to Stand 5  Supervision   Additional items Assist x 1; Increased time required;Verbal cues; Bedrails;Armrests  (instruction for hand placement)   Stand to Sit 5  Supervision   Additional items Assist x 1; Increased time required; Bedrails;Armrests  (instruction for hand placement)   Toilet transfer 5  Supervision   Additional items Assist x 1; Increased time required;Standard toilet;Verbal cues   Additional Comments Therapist instructed pt on focusing visual gaze on statonary target to compensate for symptoms Functional Mobility   Functional Mobility 5  Supervision   Additional Comments substantial time using RW    Additional items Rolling walker   Balance   Static Sitting Normal   Static Standing Fair -   Ambulatory Fair -  (using RW)   Activity Tolerance   Activity Tolerance Patient limited by fatigue   Medical Staff Made Aware care coordination w/ PTZenia  [facility]   Nurse Made Aware per RN, Edite appropriate to see pt   RUE Assessment   RUE Assessment WFL   RUE Strength   RUE Overall Strength Within Functional Limits - able to perform ADL tasks with strength   LUE Assessment   LUE Assessment WFL   LUE Strength   LUE Overall Strength Within Functional Limits - able to perform ADL tasks with strength   Hand Function   Gross Motor Coordination Functional   Fine Motor Coordination Functional   Sensation   Light Touch No apparent deficits  (B UE to light touch)   Sharp/Dull Not tested   Vision-Basic Assessment   Current Vision Does not wear glasses   Vision - Complex Assessment   Ocular Range of Motion WFL   Head Position WDL   Tracking Unable to hold eye position out of midline   Saccades Decreased speed of pursuit between targets  (pt reports + effort)   Acuity Able to read clock/calendar on wall without difficulty   Additional Comments Pt reports "feels weird" to L and vertically   Perception   Inattention/Neglect Appears intact   Motor Planning Appears intact   Perseveration Not present   Cognition   Overall Cognitive Status [facility]   Arousal/Participation Alert; Cooperative   Attention Within functional limits   Orientation Level Oriented X4   Memory Within functional limits   Following Commands Follows multistep commands without difficulty   Comments Identified pt by full name and birthdate  Able to participate in conversation and follow directions  Assessment   Limitation Decreased ADL status; Decreased endurance;Decreased self-care trans;Decreased high-level ADLs   Assessment Pt is a 40yo female admitted to [facility] on 12/26/2020  Pt presents w/ internal carotid artery dissection  Pt reports living w/ 3 teenage children in 2  PTA  Pt reports I w/ ADL/ IADL w/ out use of AD or DME including working and driving  Per neurosurgery, no acute intervention recommended at this time  Upon eval, pt alert and oriented  Able to participate in conversation appropriately w/ flat affect  Pt required S and + time to complete bed mobility and sit <> stand  Pt engaged in functional mobility using RW w/ S and + time  Pt required S and + time, cues for tech to complete LBD  Pt completed toilet transfer w/ S  Pt demonstrated B UE AROM and strength WFL to complete ADL  Pt presents w/ decreased activity tolerance, decreased endurance, decreased standing balance, decreased standing tolerance impacting her I w/ dressing, bathing, clothing mgmt, food prep / clean up, activity engagement, community mobility and functional mobility  Therapist educated pt on energy conservation, pacing, and tech to complete LBD  Recommend active participation in ADL w/ nursing staff while in acute care and outpatient vs Home therapy when medically stable for discharge from acute care  No additional acute OT needs at this time  Please re- consult if needs arise  D/C OT   Goals   Patient Goals Pt stated that she would like to go home and be able to walk  Pt added that she would like to resume baseline level of I  Plan   Treatment Interventions Compensatory technique education; Energy conservation; Activityengagement;ADL retraining;Visual perceptual retraining   Recommendation   OT Discharge Recommendation Home with skilled therapy  (oupt vs home)   Equipment Recommended   (use of RW at this time)   Barthel Index   Feeding 10   Bathing 0   Grooming Score 5   Dressing Score 5   Bladder Score 10   Bowels Score 10   Toilet Use Score 5   Transfers (Bed/Chair) Score 10   Mobility (Level Surface) Score 0   Stairs Score 0   Barthel Index Score 55   Modified Perryman Scale   Modified Perryman Scale 3   Nisreen Walker, OTR/L

## 2020-12-30 NOTE — UTILIZATION REVIEW
Notification of Discharge  This is a Notification of Discharge from our facility [address]  Please be advised that this patient has been discharge from our facility  Below you will find the admission and discharge date and time including the patients disposition  PRESENTATION DATE: 12/26/2020  5:48 PM  OBS ADMISSION DATE:   IP ADMISSION DATE: 12/26/20 2252   DISCHARGE DATE: 12/30/2020  2:53 PM  DISPOSITION: Home/Self Care Home/Self Care   Admission Orders listed below:  Admission Orders (From admission, onward)     Ordered        12/26/20 2252  Inpatient Admission  Once                   Please contact the UR Department if additional information is required to close this patient's authorization/case  [address] Utilization Review Department  Main: [phone] x carefully listen to the prompts  All voicemails are confidential   [email or web address]  Send all requests for admission clinical reviews, approved or denied determinations and any other requests to dedicated fax number below belonging to the campus where the patient is receiving treatment   List of dedicated fax numbers:  [address] DENIALS (Administrative/Medical Necessity) [phone]   [address] (Maternity/NICU/Pediatrics) [phone] [address] [facility] [phone]   [facility] [phone]   [facility] [phone]   [facility] [address] [facility] [phone]   [facility]  [phone]   [address] [facility]  [address] [address] [address] [facility] [phone]

## 2020-12-30 NOTE — PHYSICAL THERAPY NOTE
PHYSICAL THERAPY EVALUATION NOTE    Patient Name: Natalia MORAN Date: 2020     AGE:   41 y o  Mrn:   [id number]  ADMIT DX:  Dissection of carotid artery (HCC) [I77 71]  Internal carotid artery dissection (HCC) [I77 71]  Headache [R51 9]  Acute nonintractable headache, unspecified headache type [R51 9]    Past Medical History:   Diagnosis Date    Asthma     ACTIVITY INDUCED ASTHMA    IBS (irritable bowel syndrome)      Length Of Stay: 4  PHYSICAL THERAPY EVALUATION :   Patient's identity confirmed via 2 patient identifiers (full name and ) at start of session       20 1029   PT Last Visit   PT Visit Date 20   Note Type   Note type Evaluation   Pain Assessment   Pain Assessment Tool Pain Assessment not indicated - pt denies pain   Pain Score No Pain   Home Living   Type of [address] [place] [address] Two level;Stairs to enter with rails  (4-5 KAYCEE from front)   Bathroom Shower/Tub Tub/shower unit   [address]   (No AD or DME PTA)   Additional Comments Pt resides in a 2  w/ her 3 teenaged children  She states her bedroom, bathroom are on the first floor  Prior Function   Level of [place] Independent with ADLs and functional mobility   Lives With Family  (3 kids (15, 25, 22 y/o))   ADL Assistance Independent   IADLs Independent   Falls in the last 6 months 0   Vocational Full time employment  (Massage therapist, )   Comments Pt reports being independent and active PTA   Restrictions/Precautions   Weight Bearing Precautions Per Order No   General   Additional Pertinent History Pt reports dizziness as a lightheadedness, notes symptoms improve when eyes are closed  Worsen w/ transitional movements      Cognition   Overall Cognitive Status WFL   Arousal/Participation Cooperative   Attention Within functional limits   Orientation Level Oriented X4  (Pt identified by full name and )   Memory Within functional limits   Following Commands Follows all commands and directions without difficulty   Comments Pt supine in bed upon arrival  She    RLE Assessment   RLE Assessment WFL   Strength RLE   RLE Overall Strength 4/5   LLE Assessment   LLE Assessment WFL   Strength LLE   LLE Overall Strength 4/5   Coordination   Movements are Fluid and Coordinated 0   Coordination and Movement Description OCULOMOTOR: Smooth pursuits = WNL, unremarkable  Horizontal and vertical saccades = pt requires incresaed time, especially when looking L > R , and Up > Down   Sensation WFL   Light Touch   RLE Light Touch Grossly intact   LLE Light Touch Grossly intact   Bed Mobility   Supine to Sit 5  Supervision   Additional items Assist x 1;HOB elevated; Increased time required   Sit to Supine Unable to assess   Additional Comments Pt seated EOB w/ OT at end of eval   Transfers   Sit to Stand 5  Supervision   Additional items Assist x 1; Increased time required;Verbal cues   Stand to Sit 5  Supervision   Additional items Assist x 1; Increased time required   Additional Comments Pt initially ambulated ~2 ft w/ no AD w/ a guarded position and CGA/steadying A  Pt then requests to use RW for rest of session   Ambulation/Elevation   Gait pattern Decreased foot clearance; Short stride; Excessively slow  (Guarded, hesitant gait)   Gait Assistance 5  Supervision   Additional items Assist x 1   Assistive Device Rolling walker   Distance 20 ft x 2   Balance   Static Sitting Normal   Static Standing Fair -   Ambulatory Fair -  (using RW)   Activity Tolerance   Activity Tolerance Patient limited by fatigue   Medical Staff Made Aware Care coordination w/ OT Nisreen, Spoke to [facility] [address] to RN Edite   Assessment   Prognosis Good   Problem List Decreased strength;Decreased endurance; Impaired balance;Decreased mobility  (Impaired vestibulo-ocular function)   Assessment Maria Teresa Reyna is a 40 y/o Female who [facility] on 12/26/2020 from home w/ c/o intractable headache, dizziness, with a diagnosis of ICA dissection  Per neurosurgery, no acute intervention recommended  Received orders for PT eval and treat, with activity order(s) of up w/ A  This patient presents w/ comorbidities of anemia, hx of multiple OBGYN comoridities and has personal factors of living in 2 story house  Patient presents with the following impairments at time of evaluation including: weakness, decreased endurance, impaired balance and gait deviations  These impairments are evident in findings from the physical examination weakness and impaired vestibulo-occular function, mobility assessment (need for supervision assist w/ all phases of mobility when usually mobilizing independently and tolerance to only 2 x 20 feet of ambulation), and objective measure(s) Barthel Index: 17/934 and AM-PAC 6-Clicks: 28/28  During session, pt needed input for mobility technique  This patient's clinical presentation is unstable/unpredictable, which is evident in need for assist w/ all phases of mobility when usually mobilizing independently, tolerance to only 2 x 20 feet of ambulation and need for input for mobility technique  At this time patient would benefit from skilled inpatient PT in order to address abovementioned deficits in order to improve overall function and mobility  Discharge recommendation is for home w/ family support HHPT vs OPPT in order to reduce fall risk and maximize level of functional independence  Goals   Patient Goals Be able to walk, return to PLOF   STG Expiration Date 01/09/21   Short Term Goal #1 Patient will:  Increase bilateral LE strength 1/2 grade to facilitate independent mobility, Perform all bed mobility tasks independently to decrease fall risk factors, Perform all transfers independently to improve independence, Ambulate at least 200 ft  without assistive device independently w/o LOB, Navigate 5 stairs modified independent with unilateral handrail to facilitate return to previous living environment, Increase all balance 1/2 grade to decrease risk for falls, Complete exercise program independently, Tolerate 3 hr OOB to faciliate upright tolerance and Improve Barthel Index score to 80 or greater to facilitate independence   PT Treatment Day 1  (see treatment note below)   Plan   Treatment/Interventions Functional transfer training;LE strengthening/ROM; Elevations; Therapeutic exercise; Endurance training;Patient/family training;Equipment eval/education; Bed mobility;Gait training   PT Frequency 5x/wk   Recommendation   PT Discharge Recommendation Return to previous environment with social support; Other (Comment)  (HHPT vs OPPT (balance/vestibular therapy))   Equipment Recommended Walker  (Attempt trials w/ SPC, no AD)   AM-PAC Basic Mobility Inpatient   Turning in Bed Without Bedrails 4   Lying on Back to Sitting on Edge of Flat Bed 3   Moving Bed to Chair 3   Standing Up From Chair 3   Walk in Room 3   Climb 3-5 Stairs 3   Basic Mobility Inpatient Raw Score 19   Basic Mobility Standardized Score 42 48   Barthel Index   Feeding 10   Bathing 0   Grooming Score 5   Dressing Score 5   Bladder Score 10   Bowels Score 10   Toilet Use Score 5   Transfers (Bed/Chair) Score 10   Mobility (Level Surface) Score 0   Stairs Score 0   Barthel Index Score 55            PHYSICAL THERAPY TREATMENT NOTE    Name: Zafar Young  : 1975  Time in: 1040  Time out: 1048  Total treat time: 8 min    S: Pt seated EOB, she continue to appear anxious  She is eager and agreeable to participate in therapy intervention  O: Pt is S for STS from EOB  She is able to ambulate 120 ft w/ RW w/ supervision  Pt ambulates w/ short strides, guarded, hesitant gait  Educated pt on substitution method to assist w/ turns and dizziness management when ambulating and performing dynamic tasks   Pt is able to demonstrate this again when turning at the end of the kathie, and when turning to sit in the recliner chair at the end of the session  Pt verbalizes feeling that this improves her dizziness  Pt seated in recliner chair at end of session w/ call belll and room phone within reach, all needs met  Encouraged pt to ring for RN to ambulate w/ her at some point today, in addition to sitting OOB in recliner chair at meal times for 1-2 hours  Pt verbalizes understanding  A: Pt is agreeable to participate in therapy intervention  Pt actively participated in skilled interventions and demonstrated good compliance w/ cues and adaptations as appropriate  Pt already demonstrates good carryover of substitution method for her impaired vestibulo-ocular function, and to help keep dizziness symptoms from worsening w/ dynamic movement  Pt is also able to ambulate further w/ RW this session than previous   Recommend practicing ambulation w/ HHA/SPC/no AD in upcoming sessions in addition w/ RW     P: Continue per evaluation above    Skilled inpatient PT is recommended during this admission in order to progress patient toward goals so patient is able to maximize independence    Tobias Calzada, PT, DPT

## 2020-12-30 NOTE — PLAN OF CARE
Problem: PHYSICAL THERAPY ADULT  Goal: Performs mobility at highest level of function for planned discharge setting  See evaluation for individualized goals  Description: Treatment/Interventions: Functional transfer training, LE strengthening/ROM, Elevations, Therapeutic exercise, Endurance training, Patient/family training, Equipment eval/education, Bed mobility, Gait training  Equipment Recommended: Walker(Attempt trials w/ SPC, no AD)       See flowsheet documentation for full assessment, interventions and recommendations  Note: Prognosis: Good  Problem List: Decreased strength, Decreased endurance, Impaired balance, Decreased mobility(Impaired vestibulo-ocular function)  Assessment: Vanessa Dumas is a 42 y/o Female who [facility] on 12/26/2020 from home w/ c/o intractable headache, dizziness, with a diagnosis of ICA dissection  Per neurosurgery, no acute intervention recommended  Received orders for PT eval and treat, with activity order(s) of up w/ A  This patient presents w/ comorbidities of anemia, hx of multiple OBGYN comoridities and has personal factors of living in 2 story house  Patient presents with the following impairments at time of evaluation including: weakness, decreased endurance, impaired balance and gait deviations  These impairments are evident in findings from the physical examination weakness and impaired vestibulo-occular function, mobility assessment (need for supervision assist w/ all phases of mobility when usually mobilizing independently and tolerance to only 2 x 20 feet of ambulation), and objective measure(s) Barthel Index: 52/666 and AM-PAC 6-Clicks: 43/90  During session, pt needed input for mobility technique  This patient's clinical presentation is unstable/unpredictable, which is evident in need for assist w/ all phases of mobility when usually mobilizing independently, tolerance to only 2 x 20 feet of ambulation and need for input for mobility technique   At this time patient would benefit from skilled inpatient PT in order to address abovementioned deficits in order to improve overall function and mobility  Discharge recommendation is for home w/ family support HHPT vs OPPT in order to reduce fall risk and maximize level of functional independence  PT Discharge Recommendation: Return to previous environment with social support, Other (Comment)(HHPT vs OPPT (balance/vestibular therapy))          See flowsheet documentation for full assessment

## 2020-12-30 NOTE — DISCHARGE INSTR - AVS FIRST PAGE
Dear Jono Byrne,     It was our pleasure to care for you here at [facility]  It is our hope that we were always able to exceed the expected standards for your care during your stay  You were hospitalized due to headache and carotid artery dissection  You were cared for on the 4th floor under the service of Nita Lovett MD with the [facility] who covers for your primary care physician (PCP), Aung Valdez MD, while you were hospitalized  If you have any questions or concerns related to this hospitalization, you may contact us at [phone]  For follow up as well as medication refills, we recommend that you follow up with your primary care physician  A registered nurse will reach out to you by phone within a few days after your discharge to answer any additional questions that you may have after going home  However, at this time we provide for you here, the most important instructions / recommendations at discharge:     · Notable Medication Adjustments -   · We have started you on aspirin 81mg and xarelto 15mg daily  · Testing Required after Discharge -   · Repeat CT scan of the head and neck (should be completed prior to your appointment with the neurosurgery team)  · Important follow up information -   · Activity as tolerated  No strenuous activity until follow-up appointments completed  · Other Instructions -   · None   · Please review this entire after visit summary as additional general instructions including medication list, appointments, activity, diet, any pertinent wound care, and other additional recommendations from your care team that may be provided for you        Sincerely,     Nita Lovett MD

## 2020-12-30 NOTE — CASE MANAGEMENT
LOS 4 days, not a bundle or readmission  Patient is a low risk for readmission with risk of readmission score of 9  Patient is alert and oriented x 4  CM met with patient to introduce self, explain role, complete CM assessment, and discuss DC planning  Patient resides with her 3 children in 4 floor home (including attic and basement) with 6-7 KAYCEE from back entrance and 4-5 KAYCEE from front  Patient functioned independent PTA with no use of DME  No Hx HHC or STR  Patient believes she has LW and POA, reported POA is ex  Solomon Skiff who patient reports is still a support person for her, CM requested copy of documents  No Hx MH or substance use  Patient is employed as a massage therapist primarily, as well as   Patient is also in grad school and requesting note for school  SLIM made aware of need for note  Patient drives and reports ex  to transport at DC  PCP is Dr Radha Villanueva, patient has prescription coverage, and prefers using [facility] where patient's Xarelto is ready for  with $0 co pay  CM discussed therapy's evaluation and recommendation for OP therapy vs home  Patient would like scripts for OP therapy and CM provided list of therapy locations  Patient reports she would have transportation to and from likely from ex   CM discussed recommendation for walker and patient would like CM to obtain patient one for room delivery  [facility] referral sent to [facility] per preference  Jose Roberto accepted patient and liaison to deliver to patient's bedside  Patient reports no further concerns at this time  CM encouraged patient to reach out with any questions or concerns  CM will continue to follow

## 2020-12-30 NOTE — PLAN OF CARE
Problem: PAIN - ADULT  Goal: Verbalizes/displays adequate comfort level or baseline comfort level  Description: Interventions:  - Encourage patient to monitor pain and request assistance  - Assess pain using appropriate pain scale  - Administer analgesics based on type and severity of pain and evaluate response  - Implement non-pharmacological measures as appropriate and evaluate response  - Consider cultural and social influences on pain and pain management  - Notify physician/advanced practitioner if interventions unsuccessful or patient reports new pain  Outcome: Progressing     Problem: INFECTION - ADULT  Goal: Absence or prevention of progression during hospitalization  Description: INTERVENTIONS:  - Assess and monitor for signs and symptoms of infection  - Monitor lab/diagnostic results  - Monitor all insertion sites, i e  indwelling lines, tubes, and drains  - Monitor endotracheal if appropriate and nasal secretions for changes in amount and color  - [place] appropriate cooling/warming therapies per order  - Administer medications as ordered  - Instruct and encourage patient and family to use good hand hygiene technique  - Identify and instruct in appropriate isolation precautions for identified infection/condition  Outcome: Progressing  Goal: Absence of fever/infection during neutropenic period  Description: INTERVENTIONS:  - Monitor WBC    Outcome: Progressing     Problem: SAFETY ADULT  Goal: Patient will remain free of falls  Description: INTERVENTIONS:  - Assess patient frequently for physical needs  -  Identify cognitive and physical deficits and behaviors that affect risk of falls    -  [place] fall precautions as indicated by assessment   - Educate patient/family on patient safety including physical limitations  - Instruct patient to call for assistance with activity based on assessment  - Modify environment to reduce risk of injury  - Consider OT/PT consult to assist with strengthening/mobility  Outcome: Progressing  Goal: Maintain or return to baseline ADL function  Description: INTERVENTIONS:  -  Assess patient's ability to carry out ADLs; assess patient's baseline for ADL function and identify physical deficits which impact ability to perform ADLs (bathing, care of mouth/teeth, toileting, grooming, dressing, etc )  - Assess/evaluate cause of self-care deficits   - Assess range of motion  - Assess patient's mobility; develop plan if impaired  - Assess patient's need for assistive devices and provide as appropriate  - Encourage maximum independence but intervene and supervise when necessary  - Involve family in performance of ADLs  - Assess for home care needs following discharge   - Consider OT consult to assist with ADL evaluation and planning for discharge  - Provide patient education as appropriate  Outcome: Progressing  Goal: Maintain or return mobility status to optimal level  Description: INTERVENTIONS:  - Assess patient's baseline mobility status (ambulation, transfers, stairs, etc )    - Identify cognitive and physical deficits and behaviors that affect mobility  - Identify mobility aids required to assist with transfers and/or ambulation (gait belt, sit-to-stand, lift, walker, cane, etc )  - [place] fall precautions as indicated by assessment  - Record patient progress and toleration of activity level on Mobility SBAR; progress patient to next Phase/Stage  - Instruct patient to call for assistance with activity based on assessment  - Consider rehabilitation consult to assist with strengthening/weightbearing, etc   Outcome: Progressing     Problem: DISCHARGE PLANNING  Goal: Discharge to home or other facility with appropriate resources  Description: INTERVENTIONS:  - Identify barriers to discharge w/patient and caregiver  - Arrange for needed discharge resources and transportation as appropriate  - Identify discharge learning needs (meds, wound care, etc )  - Arrange for interpretive services to assist at discharge as needed  - Refer to Case Management Department for coordinating discharge planning if the patient needs post-hospital services based on physician/advanced practitioner order or complex needs related to functional status, cognitive ability, or social support system  Outcome: Progressing     Problem: Knowledge Deficit  Goal: Patient/family/caregiver demonstrates understanding of disease process, treatment plan, medications, and discharge instructions  Description: Complete learning assessment and assess knowledge base    Interventions:  - Provide teaching at level of understanding  - Provide teaching via preferred learning methods  Outcome: Progressing

## 2020-12-31 ENCOUNTER — HOSPITAL ENCOUNTER (OUTPATIENT)
Facility: HOSPITAL | Age: 45
Setting detail: OBSERVATION
Discharge: HOME | End: 2021-01-01
Attending: EMERGENCY MEDICINE | Admitting: INTERNAL MEDICINE
Payer: COMMERCIAL

## 2020-12-31 ENCOUNTER — APPOINTMENT (OUTPATIENT)
Dept: MRI IMAGING | Facility: HOSPITAL | Age: 45
End: 2020-12-31
Payer: COMMERCIAL

## 2020-12-31 ENCOUNTER — APPOINTMENT (EMERGENCY)
Dept: CT IMAGING | Facility: HOSPITAL | Age: 45
End: 2020-12-31
Payer: COMMERCIAL

## 2020-12-31 DIAGNOSIS — I77.71 INTERNAL CAROTID ARTERY DISSECTION (HCC): ICD-10-CM

## 2020-12-31 DIAGNOSIS — I63.9 ISCHEMIC STROKE (HCC): ICD-10-CM

## 2020-12-31 DIAGNOSIS — I65.21 STENOSIS OF RIGHT CAROTID ARTERY: ICD-10-CM

## 2020-12-31 DIAGNOSIS — G45.9 TIA (TRANSIENT ISCHEMIC ATTACK): ICD-10-CM

## 2020-12-31 DIAGNOSIS — R51.9 ACUTE NONINTRACTABLE HEADACHE: ICD-10-CM

## 2020-12-31 DIAGNOSIS — H53.9 VISION CHANGES: Primary | ICD-10-CM

## 2020-12-31 DIAGNOSIS — I77.71 CAROTID ARTERY DISSECTION (HCC): ICD-10-CM

## 2020-12-31 DIAGNOSIS — M62.838 MUSCLE SPASM: ICD-10-CM

## 2020-12-31 PROBLEM — R29.90 STROKE-LIKE SYMPTOMS: Status: ACTIVE | Noted: 2020-12-31

## 2020-12-31 LAB
ANION GAP SERPL CALCULATED.3IONS-SCNC: 12 MMOL/L (ref 4–13)
APTT PPP: 22 SECONDS (ref 23–37)
BUN SERPL-MCNC: 12 MG/DL (ref 5–25)
CALCIUM SERPL-MCNC: 9.5 MG/DL (ref 8.3–10.1)
CHLORIDE SERPL-SCNC: 98 MMOL/L (ref 100–108)
CO2 SERPL-SCNC: 26 MMOL/L (ref 21–32)
CREAT SERPL-MCNC: 0.84 MG/DL (ref 0.6–1.3)
ERYTHROCYTE [DISTWIDTH] IN BLOOD BY AUTOMATED COUNT: 18.8 % (ref 11.6–15.1)
EXT PREG TEST URINE: NEGATIVE
EXT. CONTROL ED NAV: NORMAL
GFR SERPL CREATININE-BSD FRML MDRD: 85 ML/MIN/1.73SQ M
GLUCOSE SERPL-MCNC: 87 MG/DL (ref 65–140)
GLUCOSE SERPL-MCNC: 98 MG/DL (ref 65–140)
HCT VFR BLD AUTO: 42.5 % (ref 34.8–46.1)
HGB BLD-MCNC: 13.4 G/DL (ref 11.5–15.4)
INR PPP: 0.99 (ref 0.84–1.19)
MCH RBC QN AUTO: 25.3 PG (ref 26.8–34.3)
MCHC RBC AUTO-ENTMCNC: 31.5 G/DL (ref 31.4–37.4)
MCV RBC AUTO: 80 FL (ref 82–98)
PLATELET # BLD AUTO: 286 THOUSANDS/UL (ref 149–390)
PMV BLD AUTO: 11.6 FL (ref 8.9–12.7)
POTASSIUM SERPL-SCNC: 4 MMOL/L (ref 3.5–5.3)
PROTHROMBIN TIME: 13.2 SECONDS (ref 11.6–14.5)
RBC # BLD AUTO: 5.3 MILLION/UL (ref 3.81–5.12)
SODIUM SERPL-SCNC: 136 MMOL/L (ref 136–145)
TROPONIN I SERPL-MCNC: <0.02 NG/ML
WBC # BLD AUTO: 8.42 THOUSAND/UL (ref 4.31–10.16)

## 2020-12-31 PROCEDURE — G1004 CDSM NDSC: HCPCS

## 2020-12-31 PROCEDURE — 85610 PROTHROMBIN TIME: CPT | Performed by: EMERGENCY MEDICINE

## 2020-12-31 PROCEDURE — 85730 THROMBOPLASTIN TIME PARTIAL: CPT | Performed by: EMERGENCY MEDICINE

## 2020-12-31 PROCEDURE — 99285 EMERGENCY DEPT VISIT HI MDM: CPT

## 2020-12-31 PROCEDURE — 99220 PR INITIAL OBSERVATION CARE/DAY 70 MINUTES: CPT | Performed by: INTERNAL MEDICINE

## 2020-12-31 PROCEDURE — 36415 COLL VENOUS BLD VENIPUNCTURE: CPT | Performed by: EMERGENCY MEDICINE

## 2020-12-31 PROCEDURE — 70551 MRI BRAIN STEM W/O DYE: CPT

## 2020-12-31 PROCEDURE — 70496 CT ANGIOGRAPHY HEAD: CPT

## 2020-12-31 PROCEDURE — 99285 EMERGENCY DEPT VISIT HI MDM: CPT | Performed by: EMERGENCY MEDICINE

## 2020-12-31 PROCEDURE — A9585 GADOBUTROL INJECTION: HCPCS | Performed by: PSYCHIATRY & NEUROLOGY

## 2020-12-31 PROCEDURE — 93005 ELECTROCARDIOGRAM TRACING: CPT

## 2020-12-31 PROCEDURE — 70549 MR ANGIOGRAPH NECK W/O&W/DYE: CPT

## 2020-12-31 PROCEDURE — 99245 OFF/OP CONSLTJ NEW/EST HI 55: CPT | Performed by: PSYCHIATRY & NEUROLOGY

## 2020-12-31 PROCEDURE — 70544 MR ANGIOGRAPHY HEAD W/O DYE: CPT

## 2020-12-31 PROCEDURE — 70498 CT ANGIOGRAPHY NECK: CPT

## 2020-12-31 PROCEDURE — 81025 URINE PREGNANCY TEST: CPT | Performed by: PSYCHIATRY & NEUROLOGY

## 2020-12-31 PROCEDURE — 84484 ASSAY OF TROPONIN QUANT: CPT | Performed by: EMERGENCY MEDICINE

## 2020-12-31 PROCEDURE — 85027 COMPLETE CBC AUTOMATED: CPT | Performed by: EMERGENCY MEDICINE

## 2020-12-31 PROCEDURE — 82948 REAGENT STRIP/BLOOD GLUCOSE: CPT

## 2020-12-31 PROCEDURE — 80048 BASIC METABOLIC PNL TOTAL CA: CPT | Performed by: EMERGENCY MEDICINE

## 2020-12-31 RX ORDER — MAGNESIUM SULFATE 1 G/100ML
1 INJECTION INTRAVENOUS 2 TIMES DAILY
Status: DISCONTINUED | OUTPATIENT
Start: 2020-12-31 | End: 2021-01-01 | Stop reason: HOSPADM

## 2020-12-31 RX ORDER — ATORVASTATIN CALCIUM 40 MG/1
40 TABLET, FILM COATED ORAL EVERY EVENING
Status: DISCONTINUED | OUTPATIENT
Start: 2020-12-31 | End: 2021-01-01 | Stop reason: HOSPADM

## 2020-12-31 RX ORDER — MELATONIN
1000 DAILY
Status: DISCONTINUED | OUTPATIENT
Start: 2021-01-01 | End: 2021-01-01 | Stop reason: HOSPADM

## 2020-12-31 RX ORDER — ASPIRIN 81 MG/1
81 TABLET ORAL DAILY
Status: DISCONTINUED | OUTPATIENT
Start: 2021-01-01 | End: 2021-01-01 | Stop reason: HOSPADM

## 2020-12-31 RX ORDER — SODIUM CHLORIDE 9 MG/ML
75 INJECTION, SOLUTION INTRAVENOUS CONTINUOUS
Status: DISCONTINUED | OUTPATIENT
Start: 2020-12-31 | End: 2021-01-01

## 2020-12-31 RX ADMIN — RIVAROXABAN 15 MG: 15 TABLET, FILM COATED ORAL at 17:22

## 2020-12-31 RX ADMIN — GADOBUTROL 7 ML: 604.72 INJECTION INTRAVENOUS at 21:30

## 2020-12-31 RX ADMIN — MAGNESIUM SULFATE HEPTAHYDRATE 1 G: 1 INJECTION, SOLUTION INTRAVENOUS at 18:55

## 2020-12-31 RX ADMIN — IOHEXOL 85 ML: 350 INJECTION, SOLUTION INTRAVENOUS at 15:25

## 2020-12-31 RX ADMIN — ATORVASTATIN CALCIUM 40 MG: 40 TABLET, FILM COATED ORAL at 23:48

## 2020-12-31 RX ADMIN — SODIUM CHLORIDE 75 ML/HR: 0.9 INJECTION, SOLUTION INTRAVENOUS at 18:13

## 2020-12-31 NOTE — ASSESSMENT & PLAN NOTE
Suspect to be migraine related per Neurology assessment however stroke is still on the differential list   Also could be related to carotid artery dissection      · Appreciate neurology recommendations  · Receiving 1 g of magnesium sulfate and 1 g of Depacon and reassess afterward  · Consider migraine cocktail if headache recur

## 2020-12-31 NOTE — CONSULTS
Consultation - Stroke   Vanessa Dumas 41 y o  female MRN: [id number]  Unit/Bed#: ED 25 Encounter: [id number]      Assessment/Plan   41year old female with recent admission this past week for acute headache and R ICA dissection/high grade stenosis following chiropractic manipulation, presenting as stroke alert on 12/31 for acute L peripheral vision abnormalities and L visual field deficit per ED  TPA Decision: Patient not a TPA candidate  Bleeding risk  and on Xarelto prior to presentation    Vision abnormalities  Assessment & Plan  -stroke alert today for acute L eye vision disturbance:  -initiate stroke pathway: (can discontinue if MRI brain returns negative for infarction/ischemia)  -CT head during alert with no acute intracranial pathology  -CTA head/neck with tapering right cervical ICA, occludes around C2 per rsdiology, high-grade stenosis around supraclinoid ICA with intimal flap concern (similar to slightly worse than CTA head/neck from last weekend)   -attending to review CTA findings with Neurosurgery/endovascular team, just to confirm no role for any endovascular intervention  -recommend MRI brain  -no echo during recent admission, would obtain now  -on Xarelto and aspirin prior to admission (was just discharged yesterday), would continue  -initiate statin if not already on prior to admission  -check hemoglobin A1c and lipid panel  -frequent neuro checks  -telemetry monitoring  -stroke education to be provided  -therapy evaluations  -for R sided neck pain, would trial aqua K pad right side of the neck      Discussed plan of care with attending neurologist, present throughout duration of stroke alert    Recommendations for outpatient neurological follow up have yet to be determined  History of Present Illness     Reason for Consult / Principal Problem: Acute vision change, recent carotid dissection  Patient last known well: approx   1 hour prior to stroke alert activation  Stroke alert called: 15:05   Neurology time of arrival: Immediately via phone, in person 10 minutes after  HPI: Katlin Moraes is a 41 y o  female per review of chart, was just admitted last week for R ICA dissection/new onset headache after chiropractic manipulation  Aside from headache had nonfocal neuro exam  Placed on Xarelto and aspirin upon discharge (which was yesterday)  Today, presents as stroke alert for acute vision changes this afternoon:  Early this morning around 1:00 AM, noted kaleidoscope type appearance to her vision in her left eye, after sometime this resolved and patient went back to sleep and woke up later this morning with no visual issues  She took a nap and upon waking up around 1:00 PM she again noticed the kaleidoscope appearance, followed by patchy blackening in the left eye, returning again to kaleidoscope appearance thereafter  Given the symptoms she presented to the ED for evaluation  Stroke alert was activated due to concern for a possible left visual field deficit  No focal motor/sensory/cerebellar deficits, no headache during this presentation  NIH of 0 as mentioned below (nonfocal exam, no visual field deficit on the left during attending's exam), CT head and CTA head/neck as mentioned above  Not a tPA candidate given active Xarelto use  MRI ordered, CTA to be reviewed with neurosurgery by attending this afternoon  Consult to Neurology  Consult performed by:  Natalie Ruiz PA-C  Consult ordered by: Wood Brenner MD          Review of Systems   Unable to perform ROS: Acuity of condition       Historical Information   Past Medical History:   Diagnosis Date    Asthma     ACTIVITY INDUCED ASTHMA    IBS (irritable bowel syndrome)      Past Surgical History:   Procedure Laterality Date    ABDOMINAL SURGERY      LAPROSCOPY FOR ENDOMETRIOSIS    CERVICAL BIOPSY  W/ LOOP ELECTRODE EXCISION      FINGER SURGERY      LEFT MIDDLE FINGER SURGERY 5 YRS AGO    TUBAL LIGATION       Social History Social History     Substance and Sexual Activity   Alcohol Use Not Currently     Social History     Substance and Sexual Activity   Drug Use No     E-Cigarette/Vaping    E-Cigarette Use Never User      E-Cigarette/Vaping Substances     Social History     Tobacco Use   Smoking Status Former Smoker   Smokeless Tobacco Never Used   Tobacco Comment    NO SMOKING IN 19 YEARS     Family History: History reviewed  No pertinent family history  Review of previous medical records was completed  Meds/Allergies   PTA meds:   Prior to Admission Medications   Prescriptions Last Dose Informant Patient Reported? Taking? CHLOROPHYLL PO   Yes No   Sig: Take 1 Dose by mouth daily   LYSINE PO   Yes No   Sig: Take 1 tablet by mouth daily   NON FORMULARY   Yes No   Sig: Floradix Iron supplement   Spirulina 500 MG TABS   Yes No   Sig: Take 1 tablet by mouth daily   UNKNOWN TO PATIENT   Yes No   UNKNOWN TO PATIENT   Yes No   UNKNOWN TO PATIENT   Yes No   UNKNOWN TO PATIENT   Yes No   aspirin (ECOTRIN LOW STRENGTH) 81 mg EC tablet   No No   Sig: Take 1 tablet (81 mg total) by mouth daily   b complex vitamins tablet   Yes No   Sig: Take 1 tablet by mouth daily   cholecalciferol (VITAMIN D3) 1,000 units tablet   Yes No   Sig: Take 1,000 Units by mouth daily   multivitamin (THERAGRAN) TABS   Yes No   Sig: Take 1 tablet by mouth daily   rivaroxaban (XARELTO) 15 mg tablet   No No   Sig: Take 1 tablet (15 mg total) by mouth daily with breakfast for 21 days   sucralfate (CARAFATE) 1 g/10 mL suspension   Yes No   Sig: Take 1 g by mouth daily      Facility-Administered Medications: None       Allergies   Allergen Reactions    Levofloxacin Other (See Comments) and Hives     "nervous system reaction, anxiety for 2 months"      Metronidazole Hives    Nuts Other (See Comments)     Allergy showed up on skin test    Other      "i took it for a yeast infection it started with the letter M"    Sulfa Antibiotics     Latex Rash       Objective Vitals:Blood pressure 103/64, pulse 100, temperature (!) 97 3 °F (36 3 °C), temperature source Oral, resp  rate 16, height 5' 4" (1 626 m), weight 74 8 kg (165 lb), SpO2 98 %  ,Body mass index is 28 32 kg/m²  No intake or output data in the 24 hours ending 12/31/20 1525    Invasive Devices: Invasive Devices     None                 Physical/neurologic exam and NIH performed by Dr Wei Bates  Physical Exam  Constitutional:       Appearance: Normal appearance  HENT:      Head: Normocephalic and atraumatic  Eyes:      Extraocular Movements: EOM normal       Pupils: Pupils are equal, round, and reactive to light  Neck:      Musculoskeletal: Normal range of motion and neck supple  Muscular tenderness (R SCM/paracervical TTP) present  Cardiovascular:      Rate and Rhythm: Normal rate and regular rhythm  Pulmonary:      Effort: Pulmonary effort is normal       Breath sounds: Normal breath sounds  Neurological:      Mental Status: She is oriented to person, place, and time  Coordination: Finger-Nose-Finger Test and Heel to [employer] normal    Psychiatric:         Speech: Speech normal        Neurologic Exam     Mental Status   Oriented to person, place, and time  Attention: normal  Concentration: normal    Speech: speech is normal     Cranial Nerves     CN II   Visual fields full to confrontation  CN III, IV, VI   Pupils are equal, round, and reactive to light  Extraocular motions are normal      CN V   Facial sensation intact  CN VII   Facial expression full, symmetric  CN VIII   CN VIII normal      CN IX, X   CN IX normal    CN X normal      CN XI   CN XI normal      CN XII   CN XII normal      Motor Exam   Muscle bulk: normal  Overall muscle tone: normalNo drift in UE/LE during NIH testing, due to acuity of condition, motor testing not fully performed        Sensory Exam   Light touch normal      Gait, Coordination, and Reflexes     Coordination   Finger to nose coordination: normal  Heel to shin coordination: normal    Tremor   Resting tremor: absent  Intention tremor: absent      NIHSS:  1a Level of Consciousness: 0 = Alert   1b  LOC Questions: 0 = Answers both correctly   1c  LOC Commands: 0 = Obeys both correctly   2  Best Gaze: 0 = Normal   3  Visual: 0 = No visual field loss   4  Facial Palsy: 0=Normal symmetric movement   5a  Motor Right Arm: 0=No drift, limb holds 90 (or 45) degrees for full 10 seconds   5b  Motor Left Arm: 0=No drift, limb holds 90 (or 45) degrees for full 10 seconds   6a  Motor Right Le=No drift, limb holds 90 (or 45) degrees for full 10 seconds   6b  Motor Left Le=No drift, limb holds 90 (or 45) degrees for full 10 seconds   7  Limb Ataxia:  0=Absent   8  Sensory: 0=Normal; no sensory loss   9  Best Language:  0=No aphasia, normal   10  Dysarthria: 0=Normal articulation   11  Extinction and Inattention (formerly Neglect): 0=No abnormality   Total Score: 0     Time NIHSS was completed: 3:20 PM    Modified Du Score:  0 (No baseline symptoms/disability)    Lab Results:   CBC:   Results from last 7 days   Lab Units 20  0516 20  0519 20  0140   WBC Thousand/uL 6 60 6 41 7 57   RBC Million/uL 4 80 4 65 4 55   HEMOGLOBIN g/dL 12 1 11 6 11 6   HEMATOCRIT % 38 4 37 1 36 7   MCV fL 80* 80* 81*   PLATELETS Thousands/uL 263 267 249   , BMP/CMP:   Results from last 7 days   Lab Units 20  0516 20  0519 20  1811   SODIUM mmol/L 135* 137 142   POTASSIUM mmol/L 4 3 4 1 4 1   CHLORIDE mmol/L 103 104 106   CO2 mmol/L 23 26 26   BUN mg/dL 10 7 11   CREATININE mg/dL 0 75 0 88 0 92   CALCIUM mg/dL 9 1 9 3 9 0   AST U/L  --   --  14   ALT U/L  --   --  21   ALK PHOS U/L  --   --  48   EGFR ml/min/1 73sq m 97 80 76   , Vitamin B12:   , HgBA1C:   , TSH:   , Coagulation:   Results from last 7 days   Lab Units 20  2232   INR  1 04   , Lipid Profile:     Imaging Studies: I have personally reviewed pertinent reports       CT head stroke alert 12/31/2020: No acute hemorrhage  Grossly stable CT of the brain  CTA head/neck 12/31/2020: Slightly worsened right cervical ICA dissection with occlusion at inferior C2 level, previously at level C1  Presumed retrograde narrow caliber flow within remaining distal right cervical ICA as well as petrous and cavernous ICA  Focal high-grade   stenosis at the right supraclinoid ICA proximal to ophthalmic artery origin, worse than prior exam   Suggested small intimal flap within the right supraclinoid ICA  EKG, Pathology, and Other Studies: I have personally reviewed pertinent reports  VTE Prophylaxis: None, in ED    Code Status: Prior    Total Critical Care time spent 45 minutes

## 2020-12-31 NOTE — H&P
H&P- Yu Prieto 1975, 41 y o  female MRN: [id number]    Unit/Bed#: ED 18 Encounter: [id number]    Primary Care Provider: Vashti Costa MD   Date and time admitted to hospital: 12/31/2020  3:03 PM        * Stroke-like symptoms  Assessment & Plan  Left-sided visual deficit and right upper extremity weakness/numbness that has been resolved  Patient was recently admitted for right extracranial internal carotid artery dissection and was discharged home on aspirin and Xarelto  · CT head:  Unremarkable  · CTA head and neck: Slightly worsened right cervical ICA dissection with occlusion at inferior C2 level, previously at level C1  · Neurology assessed the patient and recommended MRI/MRA and spoke with interventionalist Leah Mckinney from Neurosurgery who recommended imaging and continue current anticoagulation with Xarelto and aspirin    Plan:  Admitted under stroke pathway  · Continue Xarelto and aspirin  · Start statin  · Check lipid panel and hemoglobin A1c  · Follow-up on MRI/MRA, consider discussing with neurovascular surgery if results  · Maintain goal of systolic blood pressure of 120-160, currently on IV fluid infusion, consider IV fluid boluses or midodrine if needed to maintain goal blood pressure of 120-160  · PT/OT eval  · PMR eval  · Monitor on telemetry  · Neurochecks  · Admit her under step-down to and discussed the case with Critical Care Team  · Appreciate neurology recommendations and assistance      Acute nonintractable headache  Assessment & Plan  Suspect to be migraine related per Neurology assessment however stroke is still on the differential list   Also could be related to carotid artery dissection      · Appreciate neurology recommendations  · Receiving 1 g of magnesium sulfate and 1 g of Depacon and reassess afterward  · Consider migraine cocktail if headache recur    Internal carotid artery dissection [facility])  Assessment & Plan  Patient had chiropractic procedure on the 20th of December and started to have headaches afterward with focal deficits therefore she was admitted and found to have right internal carotid artery dissection  She was discharged with Xarelto and aspirin with outpatient follow-up  · Repeat CT head and neck today showing worsening of the dissection  · Neurosurgery was consulted on prior admission we did not recommend any urgent intervention and follow-up in clinic in 1-2 weeks, neurology discussed the case with Neurosurgery today who recommended repeat imaging therefore consider consulting them if imaging results are significant  · See plan above      VTE Prophylaxis: Rivaroxaban (Xarelto)  / sequential compression device   Code Status:  Level 1 full code  POLST: POLST form is not discussed and not completed at this time  Anticipated Length of Stay:  Patient will be admitted on an Observation basis with an anticipated length of stay of  less than 2 midnights  Justification for Hospital Stay:  Stroke-like symptoms    Chief Complaint:   Left eye vision deficit    History of Present Illness:    Lucy Jacobo is a 41 y o  female with medical history significant for right internal carotid artery dissection who presents with left eye vision deficit started today with right upper extremity numbness that has been resolved now  Patient was recently admitted to [facility] due to headache after having a chiropractic maneuver and had CT a head and neck showing right cervical internal carotid artery dissection and was evaluated by Neurology and neuro surgery who recommended Xarelto and aspirin with outpatient follow-up  Physical therapy saw the patient as she had imbalance gait and recommended outpatient physical therapy  Now she presented with new focal deficits concerning for stroke given her history of the internal carotid artery dissection    CT head was unremarkable and CTA head and neck was repeated showing worsening of the dissection therefore Neurology was consulted who recommended continuing Xarelto and aspirin and admitting the patient under stroke pathway as patient is at risk for stroke however they do not think that her current symptoms are related to the dissection and could be migraine headache  Neurology spoke with Neurosurgery who recommended MRI/MRA and continuing current anticoagulation plan who also thought that her ocular deficit could be migraine or related to dissection but patient is still a risk of stroke  Patient was not a candidate for tPA as she is on Xarelto and all of her deficits resolved now  Patient will be admitted to step-down 2 for observation under stroke pathway including monitoring on telemetry and neuro checks and Critical Care Team is aware  Review of Systems:    Review of Systems   Constitutional: Negative for activity change, appetite change, chills, diaphoresis and fever  HENT: Negative for congestion and sore throat  Respiratory: Negative for cough, shortness of breath and wheezing  Cardiovascular: Negative for chest pain, palpitations and leg swelling  Gastrointestinal: Negative for abdominal pain, diarrhea, nausea and vomiting  Genitourinary: Negative for difficulty urinating and dysuria  Musculoskeletal: Positive for gait problem  Negative for arthralgias and back pain  Skin: Negative for rash and wound  Neurological: Negative for dizziness, speech difficulty, light-headedness, numbness and headaches  Psychiatric/Behavioral: Negative for confusion  The patient is not nervous/anxious          Past Medical and Surgical History:     Past Medical History:   Diagnosis Date    Asthma     ACTIVITY INDUCED ASTHMA    IBS (irritable bowel syndrome)        Past Surgical History:   Procedure Laterality Date    ABDOMINAL SURGERY      LAPROSCOPY FOR ENDOMETRIOSIS    CERVICAL BIOPSY  W/ LOOP ELECTRODE EXCISION      FINGER SURGERY      LEFT MIDDLE FINGER SURGERY 5 YRS AGO    TUBAL LIGATION Meds/Allergies:    Prior to Admission medications    Medication Sig Start Date End Date Taking? Authorizing Provider   aspirin (ECOTRIN LOW STRENGTH) 81 mg EC tablet Take 1 tablet (81 mg total) by mouth daily 12/31/20 1/30/21  Brooklynn Ly MD   b complex vitamins tablet Take 1 tablet by mouth daily    Historical Provider, MD   CHLOROPHYLL PO Take 1 Dose by mouth daily    Historical Provider, MD   cholecalciferol (VITAMIN D3) 1,000 units tablet Take 1,000 Units by mouth daily    Historical Provider, MD   LYSINE PO Take 1 tablet by mouth daily    Historical Provider, MD   multivitamin (THERAGRAN) TABS Take 1 tablet by mouth daily    Historical Provider, MD   NON FORMULARY Floradix Iron supplement    Historical Provider, MD   rivaroxaban (XARELTO) 15 mg tablet Take 1 tablet (15 mg total) by mouth daily with breakfast for 21 days 12/28/20 1/18/21  Polo Morocho DO   Spirulina 500 MG TABS Take 1 tablet by mouth daily    Historical Provider, MD   sucralfate (CARAFATE) 1 g/10 mL suspension Take 1 g by mouth daily    Historical Provider, MD   UNKNOWN TO PATIENT     Historical Provider, MD   UNKNOWN TO PATIENT     Historical Provider, MD   UNKNOWN TO PATIENT     Historical Provider, MD   UNKNOWN TO PATIENT     Historical Provider, MD     I have reviewed home medications with patient personally  Allergies: Allergies   Allergen Reactions    Levofloxacin Other (See Comments) and Hives     "nervous system reaction, anxiety for 2 months"      Metronidazole Hives    Nuts Other (See Comments)     Allergy showed up on skin test    Other      "i took it for a yeast infection it started with the letter M"    Sulfa Antibiotics     Latex Rash       Social History:     Marital Status:    Occupation:   Patient Pre-hospital Living Situation:  Home with family  Patient Pre-hospital Level of Mobility:  Independent however she has been using cane since she was diagnosed with the internal carotid artery dissection  Patient Pre-hospital Diet Restrictions:  None  Substance Use History:   Social History     Substance and Sexual Activity   Alcohol Use Not Currently     Social History     Tobacco Use   Smoking Status Former Smoker   Smokeless Tobacco Never Used   Tobacco Comment    NO SMOKING IN 23 YEARS     Social History     Substance and Sexual Activity   Drug Use No       Family History:    History reviewed  No pertinent family history  Physical Exam:     Vitals:   Blood Pressure: 121/56 (12/31/20 1537)  Pulse: 83 (12/31/20 1537)  Temperature: (!) 97 3 °F (36 3 °C) (12/31/20 1447)  Temp Source: Oral (12/31/20 1447)  Respirations: 18 (12/31/20 1722)  Height: 5' 4" (162 6 cm) (12/31/20 1447)  Weight - Scale: 74 8 kg (165 lb) (12/31/20 1447)  SpO2: 99 % (12/31/20 1537)    Physical Exam  Vitals signs and nursing note reviewed  Constitutional:       General: She is not in acute distress  Appearance: She is not diaphoretic  HENT:      Head: Normocephalic and atraumatic  Mouth/Throat:      Mouth: Mucous membranes are moist       Pharynx: Oropharynx is clear  Eyes:      General: No scleral icterus  Right eye: No discharge  Left eye: No discharge  Extraocular Movements: Extraocular movements intact  Conjunctiva/sclera: Conjunctivae normal       Pupils: Pupils are equal, round, and reactive to light  Neck:      Musculoskeletal: Normal range of motion and neck supple  Cardiovascular:      Rate and Rhythm: Normal rate and regular rhythm  Pulses: Normal pulses  Heart sounds: Normal heart sounds  No murmur  Pulmonary:      Effort: Pulmonary effort is normal  No respiratory distress  Breath sounds: Normal breath sounds  No wheezing or rales  Abdominal:      General: Bowel sounds are normal       Palpations: Abdomen is soft  Tenderness: There is no abdominal tenderness  Musculoskeletal: Normal range of motion  Right lower leg: No edema        Left lower leg: No edema  Lymphadenopathy:      Cervical: No cervical adenopathy  Skin:     General: Skin is warm and dry  Capillary Refill: Capillary refill takes less than 2 seconds  Neurological:      General: No focal deficit present  Mental Status: She is alert and oriented to person, place, and time  Cranial Nerves: No cranial nerve deficit  Sensory: No sensory deficit  Motor: No weakness  Coordination: Coordination normal    Psychiatric:         Mood and Affect: Mood normal          Behavior: Behavior normal              Additional Data:     Lab Results: I have personally reviewed pertinent reports  Results from last 7 days   Lab Units 12/31/20  1530  12/28/20  0140   WBC Thousand/uL 8 42   < > 7 57   HEMOGLOBIN g/dL 13 4   < > 11 6   HEMATOCRIT % 42 5   < > 36 7   PLATELETS Thousands/uL 286   < > 249   NEUTROS PCT %  --   --  52   LYMPHS PCT %  --   --  33   MONOS PCT %  --   --  12   EOS PCT %  --   --  2    < > = values in this interval not displayed  Results from last 7 days   Lab Units 12/31/20  1530  12/26/20  1811   POTASSIUM mmol/L 4 0   < > 4 1   CHLORIDE mmol/L 98*   < > 106   CO2 mmol/L 26   < > 26   BUN mg/dL 12   < > 11   CREATININE mg/dL 0 84   < > 0 92   CALCIUM mg/dL 9 5   < > 9 0   ALK PHOS U/L  --   --  48   ALT U/L  --   --  21   AST U/L  --   --  14    < > = values in this interval not displayed  Results from last 7 days   Lab Units 12/31/20  1530   INR  0 99       Imaging: I have personally reviewed pertinent reports  Cta Head And Neck With And Without Contrast    Result Date: 12/26/2020  Narrative: CTA NECK AND BRAIN WITH AND WITHOUT CONTRAST INDICATION: severe headaches, started after neck manipulation  COMPARISON:   None  TECHNIQUE:  Routine CT imaging of the Brain without contrast   Post contrast imaging was performed after administration of iodinated contrast through the neck and brain   Post contrast axial 0 625 mm images timed to opacify the arterial system  3D rendering was performed on an independent workstation  MIP reconstructions performed  Coronal reconstructions were performed of the noncontrast portion of the brain  Radiation dose length product (DLP) for this visit:  95357 65 88 57 mGy-cm   This examination, like all CT scans performed in the [facility], was performed utilizing techniques to minimize radiation dose exposure, including the use of iterative reconstruction and automated exposure control  IV Contrast:  85 mL of iohexol (OMNIPAQUE)  IMAGE QUALITY:   Diagnostic FINDINGS: NONCONTRAST BRAIN PARENCHYMA:  No intracranial mass, mass effect or midline shift  No CT signs of acute infarction  No acute parenchymal hemorrhage  VENTRICLES AND EXTRA-AXIAL SPACES:  Normal for the patient's age  VISUALIZED ORBITS AND PARANASAL SINUSES:  Unremarkable  CERVICAL VASCULATURE AORTIC ARCH AND GREAT VESSELS:  Normal aortic arch and great vessel origins  Normal visualized subclavian vessels  RIGHT VERTEBRAL ARTERY CERVICAL SEGMENT:  Normal origin  The vessel is normal in caliber throughout the neck  LEFT VERTEBRAL ARTERY CERVICAL SEGMENT:  Normal origin  The vessel is normal in caliber throughout the neck  RIGHT EXTRACRANIAL CAROTID SEGMENT:  Normal caliber common carotid artery  Normal bifurcation  High-grade stenosis terminating in occlusion at the C1 level of the right cervical ICA suggesting dissection  There is decreased flow distally in the right cervical and right cavernous ICA possibly from retrograde flow  LEFT EXTRACRANIAL CAROTID SEGMENT:  Normal caliber common carotid artery  Normal bifurcation and cervical internal carotid artery  No stenosis or dissection  NASCET criteria was used to determine the degree of internal carotid artery diameter stenosis  INTRACRANIAL VASCULATURE INTERNAL CAROTID ARTERIES: Decreased flow in the right cavernous carotid artery likely from right cervical ICA dissection   Otherwise, Normal enhancement of the intracranial portions of the internal carotid arteries  Normal ophthalmic artery origins  Normal ICA terminus  ANTERIOR CIRCULATION:  Symmetric A1 segments and anterior cerebral arteries with normal enhancement  Normal anterior communicating artery  MIDDLE CEREBRAL ARTERY CIRCULATION:  M1 segment and middle cerebral artery branches demonstrate normal enhancement bilaterally  DISTAL VERTEBRAL ARTERIES:  Normal distal vertebral arteries  Posterior inferior cerebellar artery origins are normal  Normal vertebral basilar junction  BASILAR ARTERY:  Basilar artery is normal in caliber  Normal superior cerebellar arteries  POSTERIOR CEREBRAL ARTERIES: Both posterior cerebral arteries arises from the basilar tip  Both arteries demonstrate normal enhancement  Normal posterior communicating arteries  DURAL VENOUS SINUSES:  Normal  NON VASCULAR ANATOMY BONY STRUCTURES:  No acute osseous abnormality  SOFT TISSUES OF THE NECK:  Normal  THORACIC INLET:  Unremarkable  Impression: High-grade stenosis terminating in occlusion at the C1 level of the right cervical ICA suggesting dissection  There is decreased flow distally in the right cervical and right cavernous ICA possibly from retrograde flow  No evidence of acute intracranial hemorrhage  I personally discussed this study with Laura López on 12/26/2020 at 8:37 PM  Workstation performed: [id number]     Ct Stroke Alert Brain    Result Date: 12/31/2020  Narrative: CT BRAIN - STROKE ALERT PROTOCOL INDICATION:   Stroke alert  COMPARISON:  CTA head and neck 12/26/2020 TECHNIQUE:  CT examination of the brain was performed  In addition to axial images, coronal reformatted images were created and submitted for interpretation  Radiation dose length product (DLP) for this visit:  810 mGy-cm     This examination, like all CT scans performed in the [facility], was performed utilizing techniques to minimize radiation dose exposure, including the use of iterative reconstruction and automated exposure control  IMAGE QUALITY:  Diagnostic  FINDINGS:  PARENCHYMA:  No intracranial masslike lesion, mass effect or midline shift  No acute intracranial hemorrhage  Gray-white matter differentiation is grossly preserved  VENTRICLES AND EXTRA-AXIAL SPACES:  Normal for patient's age  VISUALIZED ORBITS AND PARANASAL SINUSES:  Unremarkable  CALVARIUM AND EXTRACRANIAL SOFT TISSUES:   Normal      Impression: No acute hemorrhage  Grossly stable CT of the brain  Findings were directly discussed with Lisa Hoffmann on 12/31/2020 3:32 PM  Workstation performed: [id number]     Cta Stroke Alert (head/neck)    Result Date: 12/31/2020  Narrative: CTA NECK AND BRAIN WITH CONTRAST INDICATION: Stroke alert COMPARISON:   CTA head and neck 12/26/2020 TECHNIQUE:   Post contrast imaging was performed after administration of iodinated contrast through the neck and brain  Post contrast axial 0 625 mm images timed to opacify the arterial system  3D rendering was performed on an independent workstation  MIP reconstructions performed  Coronal reconstructions were performed of the noncontrast portion of the brain  Radiation dose length product (DLP) for this visit:  894 mGy-cm   This examination, like all CT scans performed in the [facility], was performed utilizing techniques to minimize radiation dose exposure, including the use of iterative reconstruction and automated exposure control  IV Contrast:  85 mL of iohexol (OMNIPAQUE)  IMAGE QUALITY:   Diagnostic FINDINGS: CERVICAL VASCULATURE AORTIC ARCH AND GREAT VESSELS: Aortic arch and great vessel origins are unremarkable  RIGHT VERTEBRAL ARTERY CERVICAL SEGMENT:The vessel origin is unremarkable  The vessel is patent throughout the neck without significant stenosis  LEFT VERTEBRAL ARTERY CERVICAL SEGMENT: The vessel origin is unremarkable  The vessel is patent throughout the neck without significant stenosis  RIGHT EXTRACRANIAL CAROTID SEGMENT:Redemonstrated tapering caliber of right cervical ICA with occlusion at inferior C2 level, previously was at the level C1  There is presumed retrograde narrow caliber flow within the distal cervical ICA and intracranial ICA  LEFT EXTRACRANIAL CAROTID SEGMENT: The carotid bifurcation and cervical internal carotid artery are unremarkable  No stenosis or dissection  NASCET criteria was used to determine the degree of internal carotid artery diameter stenosis  INTRACRANIAL VASCULATURE INTERNAL CAROTID ARTERIES: Similar to prior exam there is patent small caliber petrous and cavernous right internal carotid artery  There is a focal high-grade stenosis at proximal right supraclinoid ICA proximal to ophthalmic artery origin (series 3 image 183), worse than prior exam   There is suggested small intimal flap within right supraclinoid ICA (series 3 image 178)  Intracranial left internal carotid artery is patent and unremarkable  ANTERIOR CIRCULATION:  Normal A1 segments  Bilateral anterior cerebral arteries are unremarkable  Normal anterior comminuting artery  MIDDLE CEREBRAL ARTERY CIRCULATION:  Bilateral M1 segments and major M2 branches are patent without high-grade stenosis  DISTAL VERTEBRAL ARTERIES:  Distal vertebral arteries are patent without high-grade stenosis  Posterior inferior cerebellar artery origins are patent  BASILAR ARTERY:  Basilar artery is unremarkable  Normal superior cerebellar arteries  POSTERIOR CEREBRAL ARTERIES:    Bilateral posterior cerebral arteries are patent without critical stenosis  Normal posterior communicating arteries  DURAL VENOUS SINUSES:  Unremarkable  NON VASCULAR ANATOMY BONY STRUCTURES: No acute or aggressive appearing osseous abnormality  SOFT TISSUES OF THE NECK:  Unremarkable  THORACIC INLET:  Unremarkable  Impression: Slightly worsened right cervical ICA dissection with occlusion at inferior C2 level, previously at level C1  Presumed retrograde narrow caliber flow within remaining distal right cervical ICA as well as petrous and cavernous ICA  Focal high-grade stenosis at the right supraclinoid ICA proximal to ophthalmic artery origin, worse than prior exam   Suggested small intimal flap within the right supraclinoid ICA  Findings were directly discussed with Zeke Segura on 12/31/2020 3:32 PM  Workstation performed: [id number]       EKG, Pathology, and Other Studies Reviewed on Admission:       [facility] Everywhere Records Reviewed: Yes     ** Please Note: This note has been constructed using a voice recognition system   **

## 2020-12-31 NOTE — ED PROVIDER NOTES
History  Chief Complaint   Patient presents with   Ted Lama     pt reports change in left eye vision   Arm Pain     left arm hurst from infiltrated IV  pt discharged yesterday for disection of carotid artery     HPI     Patient presents for evaluation of changes in her left vision  Recent admission for carotid artery dissection  Patient states she woke up this morning normal and over the subsequent several hours patient had 3 episodes of left-sided visual deficits she describes a Kaleidoscope"  As well as loss of vision in her left lateral field  Denies any symptoms in her extremities  Denies any significant headache  Prior to Admission Medications   Prescriptions Last Dose Informant Patient Reported? Taking?    CHLOROPHYLL PO   Yes No   Sig: Take 1 Dose by mouth daily   LYSINE PO   Yes No   Sig: Take 1 tablet by mouth daily   NON FORMULARY   Yes No   Sig: Floradix Iron supplement   Spirulina 500 MG TABS   Yes No   Sig: Take 1 tablet by mouth daily   UNKNOWN TO PATIENT   Yes No   UNKNOWN TO PATIENT   Yes No   UNKNOWN TO PATIENT   Yes No   UNKNOWN TO PATIENT   Yes No   aspirin (ECOTRIN LOW STRENGTH) 81 mg EC tablet   No No   Sig: Take 1 tablet (81 mg total) by mouth daily   b complex vitamins tablet   Yes No   Sig: Take 1 tablet by mouth daily   cholecalciferol (VITAMIN D3) 1,000 units tablet   Yes No   Sig: Take 1,000 Units by mouth daily   multivitamin (THERAGRAN) TABS   Yes No   Sig: Take 1 tablet by mouth daily   rivaroxaban (XARELTO) 15 mg tablet   No No   Sig: Take 1 tablet (15 mg total) by mouth daily with breakfast for 21 days   sucralfate (CARAFATE) 1 g/10 mL suspension   Yes No   Sig: Take 1 g by mouth daily      Facility-Administered Medications: None       Past Medical History:   Diagnosis Date    Asthma     ACTIVITY INDUCED ASTHMA    IBS (irritable bowel syndrome)        Past Surgical History:   Procedure Laterality Date    ABDOMINAL SURGERY      LAPROSCOPY FOR ENDOMETRIOSIS    CERVICAL BIOPSY  W/ LOOP ELECTRODE EXCISION      FINGER SURGERY      LEFT MIDDLE FINGER SURGERY 5 YRS AGO    TUBAL LIGATION         History reviewed  No pertinent family history  I have reviewed and agree with the history as documented  E-Cigarette/Vaping    E-Cigarette Use Never User      E-Cigarette/Vaping Substances     Social History     Tobacco Use    Smoking status: Former Smoker    Smokeless tobacco: Never Used    Tobacco comment: NO SMOKING IN 19 YEARS   Substance Use Topics    Alcohol use: Not Currently    Drug use: No       Review of Systems   Eyes: Positive for visual disturbance  All other systems reviewed and are negative  Physical Exam  Physical Exam  Vitals signs and nursing note reviewed  Constitutional:       General: She is not in acute distress  Appearance: She is well-developed  HENT:      Head: Normocephalic and atraumatic  Eyes:      Pupils: Pupils are equal, round, and reactive to light  Neck:      Musculoskeletal: Normal range of motion and neck supple  Cardiovascular:      Rate and Rhythm: Normal rate and regular rhythm  Heart sounds: Normal heart sounds  No murmur  Pulmonary:      Effort: Pulmonary effort is normal  No respiratory distress  Breath sounds: Normal breath sounds  No wheezing or rales  Abdominal:      General: Bowel sounds are normal  There is no distension  Palpations: Abdomen is soft  Tenderness: There is no abdominal tenderness  There is no guarding or rebound  Musculoskeletal: Normal range of motion  General: No deformity  Lymphadenopathy:      Cervical: No cervical adenopathy  Skin:     Capillary Refill: Capillary refill takes less than 2 seconds  Findings: No erythema or rash  Neurological:      Mental Status: She is alert and oriented to person, place, and time  Cranial Nerves: No cranial nerve deficit  Motor: No abnormal muscle tone        Coordination: Coordination normal  Comments: Left-sided visual field deficit  Normal extraocular movements  Normal 5/5 strength in bilateral upper lower extremities  Normal coordination     Psychiatric:         Behavior: Behavior normal          Vital Signs  ED Triage Vitals [12/31/20 1447]   Temperature Pulse Respirations Blood Pressure SpO2   (!) 97 3 °F (36 3 °C) 100 16 103/64 98 %      Temp Source Heart Rate Source Patient Position - Orthostatic VS BP Location FiO2 (%)   Oral Monitor Lying Left arm --      Pain Score       --           Vitals:    12/31/20 1447 12/31/20 1537   BP: 103/64 121/56   Pulse: 100 83   Patient Position - Orthostatic VS: Lying          Visual Acuity  Visual Acuity      Most Recent Value   L Pupil Size (mm)  3   R Pupil Size (mm)  3          ED Medications  Medications   magnesium sulfate IVPB (premix) SOLN 1 g (has no administration in time range)   sodium chloride 0 9 % infusion (75 mL/hr Intravenous New Bag 12/31/20 1813)   iohexol (OMNIPAQUE) 350 MG/ML injection (SINGLE-DOSE) 85 mL (85 mL Intravenous Given 12/31/20 1525)   rivaroxaban (XARELTO) tablet 15 mg (15 mg Oral Given 12/31/20 1722)       Diagnostic Studies  Results Reviewed     Procedure Component Value Units Date/Time    POCT pregnancy, urine [[id number]]     Lab Status: No result     Troponin I [[id number]]  (Normal) Collected: 12/31/20 1530    Lab Status: Final result Specimen: Blood from Arm, Left Updated: 12/31/20 1602     Troponin I <0 02 ng/mL     Protime-INR [[id number]]  (Normal) Collected: 12/31/20 1530    Lab Status: Final result Specimen: Blood from Arm, Left Updated: 12/31/20 1556     Protime 13 2 seconds      INR 0 99    APTT [[id number]]  (Abnormal) Collected: 12/31/20 1530    Lab Status: Final result Specimen: Blood from Arm, Left Updated: 12/31/20 1556     PTT 22 seconds     Basic metabolic panel [[id number]]  (Abnormal) Collected: 12/31/20 1530    Lab Status: Final result Specimen: Blood from Arm, Left Updated: 12/31/20 1554     Sodium 136 mmol/L Potassium 4 0 mmol/L      Chloride 98 mmol/L      CO2 26 mmol/L      ANION GAP 12 mmol/L      BUN 12 mg/dL      Creatinine 0 84 mg/dL      Glucose 98 mg/dL      Calcium 9 5 mg/dL      eGFR 85 ml/min/1 73sq m     Narrative:      [place] guidelines for Chronic Kidney Disease (CKD):     Stage 1 with normal or high GFR (GFR > 90 mL/min/1 73 square meters)    Stage 2 Mild CKD (GFR = 60-89 mL/min/1 73 square meters)    Stage 3A Moderate CKD (GFR = 45-59 mL/min/1 73 square meters)    Stage 3B Moderate CKD (GFR = 30-44 mL/min/1 73 square meters)    Stage 4 Severe CKD (GFR = 15-29 mL/min/1 73 square meters)    Stage 5 End Stage CKD (GFR <15 mL/min/1 73 square meters)  Note: GFR calculation is accurate only with a steady state creatinine    CBC and Platelet [[id number]]  (Abnormal) Collected: 12/31/20 1530    Lab Status: Final result Specimen: Blood from Arm, Left Updated: 12/31/20 1537     WBC 8 42 Thousand/uL      RBC 5 30 Million/uL      Hemoglobin 13 4 g/dL      Hematocrit 42 5 %      MCV 80 fL      MCH 25 3 pg      MCHC 31 5 g/dL      RDW 18 8 %      Platelets 404 Thousands/uL      MPV 11 6 fL     Fingerstick Glucose (POCT) [[id number]]  (Normal) Collected: 12/31/20 1533    Lab Status: Final result Updated: 12/31/20 1534     POC Glucose 87 mg/dl                  CTA stroke alert (head/neck)   Final Result by Floyd Burton MD (12/31 1615)      Slightly worsened right cervical ICA dissection with occlusion at inferior C2 level, previously at level C1  Presumed retrograde narrow caliber flow within remaining distal right cervical ICA as well as petrous and cavernous ICA  Focal high-grade    stenosis at the right supraclinoid ICA proximal to ophthalmic artery origin, worse than prior exam   Suggested small intimal flap within the right supraclinoid ICA              Findings were directly discussed with Anatoly Rosenberg on 12/31/2020 3:32 PM                      Workstation performed: [id number]         CT stroke alert brain   Final Result by Alissa Parrish MD (12/31 1617)      No acute hemorrhage  Grossly stable CT of the brain  Findings were directly discussed with Dragan Monte on 12/31/2020 3:32 PM       Workstation performed: [id number]         MRA head wo contrast    (Results Pending)   MRI brain wo contrast    (Results Pending)              Procedures  ECG 12 Lead Documentation Only    Date/Time: 12/31/2020 6:14 PM  Performed by: Kali Franco MD  Authorized by: Kali Franco MD     Indications / Diagnosis:  Stroke alert  Patient location:  ED  Interpretation:     Interpretation: non-specific    Rate:     ECG rate:  88    ECG rate assessment: normal    Rhythm:     Rhythm: sinus rhythm    Ectopy:     Ectopy: none    QRS:     QRS axis:  Normal    QRS intervals:  Normal  Conduction:     Conduction: normal    ST segments:     ST segments:  Normal  T waves:     T waves: inverted      Inverted:  II, III and aVF             ED Course                 Stroke Assessment     Row Name 12/31/20 1526             NIH Stroke Scale    Interval  Baseline      Level of Consciousness (1a )  0      LOC Questions (1b )  0      LOC Commands (1c )  0      Best Gaze (2 )  0      Visual (3 )  1      Facial Palsy (4 )  0      Motor Arm, Left (5a )  0      Motor Arm, Right (5b )  0      Motor Leg, Left (6a )  0      Motor Leg, Right (6b )  0      Limb Ataxia (7 )  0      Sensory (8 )  0      Best Language (9 )  0      Dysarthria (10 )  0      Extinction and Inattention (11 ) (Formerly Neglect)  0      Total  1          First Filed Value   TPA Decision  Patient not a TPA candidate  Patient is not a candidate options  -- [on xarelto]                  SBIRT 22yo+      Most Recent Value   SBIRT (22 yo +)   In order to provide better care to our patients, we are screening all of our patients for alcohol and drug use  Would it be okay to ask you these screening questions?   Yes Filed at: 12/31/2020 7766 Initial Alcohol Screen: US AUDIT-C    1  How often do you have a drink containing alcohol?  0 Filed at: 12/31/2020 1723   2  How many drinks containing alcohol do you have on a typical day you are drinking? 0 Filed at: 12/31/2020 1723   3a  Male UNDER 65: How often do you have five or more drinks on one occasion? 0 Filed at: 12/31/2020 1723   3b  FEMALE Any Age, or MALE 65+: How often do you have 4 or more drinks on one occassion? 0 Filed at: 12/31/2020 1723   Audit-C Score  0 Filed at: 12/31/2020 1723   MICHAEL: How many times in the past year have you    Used an illegal drug or used a prescription medication for non-medical reasons? Never Filed at: 12/31/2020 1723                    MDM  Number of Diagnoses or Management Options  Carotid artery dissection [facility]): new and requires workup  Vision changes: new and requires workup  Diagnosis management comments: Stroke alert initiated immediately upon my evaluation due to left hemianopsia setting of recent right internal carotid dissection  Patient is on aspirin and Xarelto  Patient seen by Neurology  Patient is not a tPA candidate due to anticoagulation use the last 48 hours  Patient's symptoms are improving in ER  CTA with possible worsening right internal carotid dissection  Neurologist, Dr Jane Ledezma spoke with endovascular physician, Annalee Mao  They are okay with Xarelto admission at this time  Plan to admitted [facility] for MRI/MRA  Patient re-evaluated and has no further visual deficits  No headache  Xarelto ordered for patient and she was admitted to hospitalist service for further evaluation, MRI/MRA         Amount and/or Complexity of Data Reviewed  Clinical lab tests: ordered and reviewed  Tests in the radiology section of CPT®: ordered and reviewed  Tests in the medicine section of CPT®: ordered and reviewed  Discuss the patient with other providers: yes    Risk of Complications, Morbidity, and/or Mortality  Presenting problems: high  Diagnostic procedures: moderate  Management options: high    Patient Progress  Patient progress: resolved      Disposition  Final diagnoses:   Vision changes   Carotid artery dissection ([facility] [address] )     Time reflects when diagnosis was documented in both MDM as applicable and the Disposition within this note     Time User Action Codes Description Comment    12/31/2020  5:39 PM Kee Lucero Add [H53 9] Vision changes     12/31/2020  5:39 PM Patricia Peres [I77 71] Carotid artery dissection ([facility] [address] )     12/31/2020  6:03 PM Caryl Khna Add [G45 9] TIA (transient ischemic attack)       ED Disposition     ED Disposition Condition Date/Time Comment    Admit Stable Thu Dec 31, 2020  5:38 PM Case was discussed with KENZIE and the patient's admission status was agreed to be Admission Status: observation status to the service of Dr Brent Benson   Follow-up Information    None         Patient's Medications   Discharge Prescriptions    No medications on file     No discharge procedures on file      PDMP Review       Value Time User    PDMP Reviewed  Yes 12/31/2020  6:02 PM Brunilda Quiles MD          ED Provider  Electronically Signed by           Charo Deutsch MD  12/31/20 4182

## 2020-12-31 NOTE — DISCHARGE SUMMARY
Discharge- Vanessa Dumas 1975, 41 y o  female MRN: [id number]    Unit/Bed#: S -67 Encounter: [id number]    Primary Care Provider: Tracy Avelar MD   Date and time admitted to hospital: 12/26/2020  5:48 PM        * Internal carotid artery dissection [facility])  Assessment & Plan  · Presented with a persistent headache which began after chiropractic manipulation of her neck on 12/20/2020  · CTA head and neck: "High-grade stenosis terminating in occlusion at the C1 level of the right cervical ICA suggesting dissection  There is decreased flow distally in the right cervical and right cavernous ICA possibly from retrograde flow "  · Vascular surgery consulted, no acute vascular intervention recommended at this time  · Neurosurgery consulted, no acute intervention recommended at this time, patient is to be seen in clinic in 1-2 weeks with repeat cta head and neck  · IV heparin switched to Xarelto for dischargee  · Aspirin 81 mg daily  Acute nonintractable headache  Assessment & Plan  · Likely due to above  · Pain control  Anemia  Assessment & Plan  · Resolved  · Hg 11 4 on admission  · IV iron given      Discharging Physician / Practitioner: Pineda Huang MD  PCP: Tracy Avelar MD  Admission Date:   Admission Orders (From admission, onward)     Ordered        12/26/20 2252  Inpatient Admission  Once                   Discharge Date: 12/30/20    Resolved Problems  Date Reviewed: 12/31/2020    None          Consultations During Hospital Stay:  · Vascular surgery  · Neurosurgery  · neurology    Procedures Performed:   · None     Significant Findings / Test Results:   · CTA head and neck  IMPRESSION:     High-grade stenosis terminating in occlusion at the C1 level of the right cervical ICA suggesting dissection  There is decreased flow distally in the right cervical and right cavernous ICA possibly from retrograde flow      No evidence of acute intracranial hemorrhage      Incidental Findings: · None      Test Results Pending at Discharge (will require follow up):   · none     Outpatient Tests Requested:    Repeat CTA head and neck  Complications:  None     Reason for Admission: severe headache  Hospital Course:     Andres Luevano is a 41 y o  female patient  History of IBSwho originally presented to the hospital on 12/26/2020 due to  New onset headache that started soon after she chiropractic procedure performed on her neck  In the ED patient underwent CT imaging of her head and neck which showed occlusion of the right ICA  Patient was seen and evaluated by vascular surgery as well as Neurology  Recommended empiric anticoagulation with IV heparin initiation of anti-platelet  Patient was eventually transition to Xarelto with plan to follow up with  The Neurosurgery/ neurovascular team for repeat imaging in 1-2 weeks  Please see above list of diagnoses and related plan for additional information  Condition at Discharge: fair     Discharge Day Visit / Exam:     Subjective:  Feels better overall  Still with mild headache and dizziness with standing  Vitals: Blood Pressure: 100/63 (12/30/20 0700)  Pulse: 86 (12/30/20 0700)  Temperature: 98 °F (36 7 °C) (12/30/20 0700)  Temp Source: Oral (12/30/20 0700)  Respirations: 18 (12/30/20 0700)  Height: 5' 4" (162 6 cm) (12/26/20 1743)  Weight - Scale: 74 8 kg (165 lb) (12/26/20 1743)  SpO2: 95 % (12/30/20 0700)  Exam:   Physical Exam  Vitals signs and nursing note reviewed  Constitutional:       Appearance: Normal appearance  HENT:      Head: Normocephalic and atraumatic  Neck:      Musculoskeletal: No neck rigidity  Pulmonary:      Effort: No respiratory distress  Breath sounds: No wheezing  Neurological:      General: No focal deficit present  Mental Status: She is alert and oriented to person, place, and time  Mental status is at baseline  Motor: Weakness (generalized ) present     Psychiatric:         Mood and Affect: Mood normal          Behavior: Behavior normal          Thought Content: Thought content normal          Judgment: Judgment normal            Discharge instructions/Information to patient and family:   See after visit summary for information provided to patient and family  Provisions for Follow-Up Care:  See after visit summary for information related to follow-up care and any pertinent home health orders  Disposition:     Home      Planned Readmission: none      Discharge Statement:  I spent 35 minutes discharging the patient  This time was spent on the day of discharge  I had direct contact with the patient on the day of discharge  Greater than 50% of the total time was spent examining patient, answering all patient questions, arranging and discussing plan of care with patient as well as directly providing post-discharge instructions  Additional time then spent on discharge activities  Discharge Medications:  See after visit summary for reconciled discharge medications provided to patient and family        ** Please Note: This note has been constructed using a voice recognition system **

## 2020-12-31 NOTE — ASSESSMENT & PLAN NOTE
Left-sided visual deficit and right upper extremity weakness/numbness that has been resolved  Patient was recently admitted for right extracranial internal carotid artery dissection and was discharged home on aspirin and Xarelto      · CT head:  Unremarkable  · CTA head and neck: Slightly worsened right cervical ICA dissection with occlusion at inferior C2 level, previously at level C1  · Neurology assessed the patient and recommended MRI/MRA and spoke with interventionalist Osiel Spear from Neurosurgery who recommended imaging and continue current anticoagulation with Xarelto and aspirin    Plan:  Admitted under stroke pathway  · Continue Xarelto and aspirin  · Start statin  · Check lipid panel and hemoglobin A1c  · Follow-up on MRI/MRA, consider discussing with neurovascular surgery if results  · Maintain goal of systolic blood pressure of 120-160, currently on IV fluid infusion, consider IV fluid boluses or midodrine if needed to maintain goal blood pressure of 120-160  · PT/OT eval  · PMR eval  · Monitor on telemetry  · Neurochecks  · Admit her under step-down to and discussed the case with Critical Care Team  · Appreciate neurology recommendations and assistance

## 2020-12-31 NOTE — QUICK NOTE
I was contacted regarding Ms  Courtney's visual complaints and CTA findings  In my review, she has now developed likely complete occlusion or worsening critical stenosis of her cervical right ICA  The appearance of the diminutive petrous through supraclinoid segments is most likely due to poor perfusion causing vessel collapse rather than extension of the dissection flap intracranially  Symptoms are of unclear etiology, possibly ocular migraine/headache syndrome due to the dissection  Less likely but still significant chance that she embolized to her right ophthalmic artery or PCA territory  Regardless, she is still at risk of stroke therefore should maintain on dual therapy either dual antiplatelets or AC plus ASA  Obtain stat MRI brain and MRA head wo and MRA neck w/wo including T1FS sequences in the neck  Admit for at least 24 hours with frequent neuro checks

## 2020-12-31 NOTE — ASSESSMENT & PLAN NOTE
Patient had chiropractic procedure on the 20th of December and started to have headaches afterward with focal deficits therefore she was admitted and found to have right internal carotid artery dissection  She was discharged with Xarelto and aspirin with outpatient follow-up      · Repeat CT head and neck today showing worsening of the dissection  · Neurosurgery was consulted on prior admission we did not recommend any urgent intervention and follow-up in clinic in 1-2 weeks, neurology discussed the case with Neurosurgery today who recommended repeat imaging therefore consider consulting them if imaging results are significant  · See plan above

## 2020-12-31 NOTE — ASSESSMENT & PLAN NOTE
-stroke alert today for acute L eye vision disturbance:  -initiate stroke pathway: (can discontinue if MRI brain returns negative for infarction/ischemia)  -CT head during alert with no acute intracranial pathology  -CTA head/neck with tapering right cervical ICA, occludes around C2 per rsdiology, high-grade stenosis around supraclinoid ICA with intimal flap concern (similar to slightly worse than CTA head/neck from last weekend)   -attending to review CTA findings with Neurosurgery/endovascular team, just to confirm no role for any endovascular intervention  -recommend MRI brain  -no echo during recent admission, would obtain now  -on Xarelto and aspirin prior to admission (was just discharged yesterday), would continue  -initiate statin if not already on prior to admission  -check hemoglobin A1c and lipid panel  -frequent neuro checks  -telemetry monitoring  -stroke education to be provided  -therapy evaluations  -for R sided neck pain, would trial aqua K pad right side of the neck

## 2020-12-31 NOTE — ASSESSMENT & PLAN NOTE
· Presented with a persistent headache which began after chiropractic manipulation of her neck on 12/20/2020  · CTA head and neck: "High-grade stenosis terminating in occlusion at the C1 level of the right cervical ICA suggesting dissection  There is decreased flow distally in the right cervical and right cavernous ICA possibly from retrograde flow "  · Vascular surgery consulted, no acute vascular intervention recommended at this time  · Neurosurgery consulted, no acute intervention recommended at this time, patient is to be seen in clinic in 1-2 weeks with repeat cta head and neck  · IV heparin switched to Xarelto for dischargee  · Aspirin 81 mg daily

## 2021-01-01 VITALS
WEIGHT: 165 LBS | TEMPERATURE: 98.8 F | SYSTOLIC BLOOD PRESSURE: 119 MMHG | BODY MASS INDEX: 28.17 KG/M2 | DIASTOLIC BLOOD PRESSURE: 58 MMHG | RESPIRATION RATE: 16 BRPM | HEART RATE: 75 BPM | OXYGEN SATURATION: 99 % | HEIGHT: 64 IN

## 2021-01-01 PROBLEM — I63.9 ISCHEMIC STROKE (HCC): Status: ACTIVE | Noted: 2020-12-31

## 2021-01-01 PROBLEM — R51.9 ACUTE NONINTRACTABLE HEADACHE: Status: RESOLVED | Noted: 2020-12-26 | Resolved: 2021-01-01

## 2021-01-01 LAB
ATRIAL RATE: 88 BPM
CHOLEST SERPL-MCNC: 124 MG/DL (ref 50–200)
EST. AVERAGE GLUCOSE BLD GHB EST-MCNC: 97 MG/DL
HBA1C MFR BLD: 5 %
HDLC SERPL-MCNC: 36 MG/DL
LDLC SERPL CALC-MCNC: 73 MG/DL (ref 0–100)
P AXIS: 66 DEGREES
PR INTERVAL: 104 MS
QRS AXIS: 75 DEGREES
QRSD INTERVAL: 86 MS
QT INTERVAL: 352 MS
QTC INTERVAL: 425 MS
T WAVE AXIS: -27 DEGREES
TRIGL SERPL-MCNC: 74 MG/DL
VENTRICULAR RATE: 88 BPM

## 2021-01-01 PROCEDURE — 97167 OT EVAL HIGH COMPLEX 60 MIN: CPT

## 2021-01-01 PROCEDURE — 80061 LIPID PANEL: CPT | Performed by: INTERNAL MEDICINE

## 2021-01-01 PROCEDURE — 99217 PR OBSERVATION CARE DISCHARGE MANAGEMENT: CPT | Performed by: HOSPITALIST

## 2021-01-01 PROCEDURE — 36415 COLL VENOUS BLD VENIPUNCTURE: CPT | Performed by: INTERNAL MEDICINE

## 2021-01-01 PROCEDURE — 93010 ELECTROCARDIOGRAM REPORT: CPT | Performed by: INTERNAL MEDICINE

## 2021-01-01 PROCEDURE — RECHECK: Performed by: HOSPITALIST

## 2021-01-01 PROCEDURE — 83036 HEMOGLOBIN GLYCOSYLATED A1C: CPT | Performed by: INTERNAL MEDICINE

## 2021-01-01 RX ORDER — GABAPENTIN 100 MG/1
100 CAPSULE ORAL 3 TIMES DAILY PRN
Qty: 30 CAPSULE | Refills: 0 | Status: SHIPPED | OUTPATIENT
Start: 2021-01-01

## 2021-01-01 RX ORDER — SODIUM CHLORIDE 9 MG/ML
100 INJECTION, SOLUTION INTRAVENOUS CONTINUOUS
Status: DISCONTINUED | OUTPATIENT
Start: 2021-01-01 | End: 2021-01-01

## 2021-01-01 RX ORDER — TIZANIDINE HYDROCHLORIDE 2 MG/1
2 CAPSULE, GELATIN COATED ORAL 3 TIMES DAILY PRN
Qty: 30 CAPSULE | Refills: 0 | Status: SHIPPED | OUTPATIENT
Start: 2021-01-01 | End: 2021-01-06 | Stop reason: SDUPTHER

## 2021-01-01 RX ORDER — ATORVASTATIN CALCIUM 40 MG/1
40 TABLET, FILM COATED ORAL EVERY EVENING
Qty: 30 TABLET | Refills: 0 | Status: SHIPPED | OUTPATIENT
Start: 2021-01-01

## 2021-01-01 RX ADMIN — MAGNESIUM SULFATE HEPTAHYDRATE 1 G: 1 INJECTION, SOLUTION INTRAVENOUS at 08:05

## 2021-01-01 RX ADMIN — Medication 1000 UNITS: at 08:05

## 2021-01-01 RX ADMIN — RIVAROXABAN 15 MG: 15 TABLET, FILM COATED ORAL at 16:45

## 2021-01-01 RX ADMIN — ASPIRIN 81 MG: 81 TABLET, COATED ORAL at 08:05

## 2021-01-01 RX ADMIN — ATORVASTATIN CALCIUM 40 MG: 40 TABLET, FILM COATED ORAL at 16:45

## 2021-01-01 RX ADMIN — B-COMPLEX W/ C & FOLIC ACID TAB 1 TABLET: TAB at 08:12

## 2021-01-01 NOTE — ASSESSMENT & PLAN NOTE
Left-sided visual deficit and right upper extremity weakness/numbness that has been resolved  Patient was recently admitted for right extracranial internal carotid artery dissection and was discharged home on aspirin and Xarelto      · CT head:  Unremarkable  · CTA head and neck: Slightly worsened right cervical ICA dissection with occlusion at inferior C2 level, previously at level C1  · Neurology assessed the patient and recommended MRI/MRA and spoke with interventionalist Dr Guzman from Neurosurgery who recommended imaging and continue current anticoagulation with Xarelto and aspirin  · MRI brain showing right frontal focus of cortical acute to subacute ischemia  · MRA right carotid internal carotid artery dissection is not significantly changed from prior CTA    Plan:  Admitted under stroke pathway  · Continue Xarelto and aspirin  · Continue statin  · lipid panel and hemoglobin A1c within normal range  · PT/OT eval:  Outpatient therapy  · Monitored on telemetry:  No events  · Appreciate neurology recommendations and assistance  · Patient to follow up with neurology and neurosurgery in office with repeat imaging ordered by neurosurgery in outpatient setting

## 2021-01-01 NOTE — PLAN OF CARE
Problem: OCCUPATIONAL THERAPY ADULT  Goal: Performs self-care activities at highest level of function for planned discharge setting  See evaluation for individualized goals  Description: Treatment Interventions: ADL retraining, Visual perceptual retraining, UE strengthening/ROM, Endurance training, Patient/family training, Equipment evaluation/education, Continued evaluation, Energy conservation, Activityengagement          See flowsheet documentation for full assessment, interventions and recommendations  Note: Limitation: Decreased high-level ADLs, Decreased self-care trans, Decreased ADL status, Decreased endurance, Visual deficit     Assessment: Pt is a 40 yo female admitted to [facility]  Pt presents w/ stroke - like symptoms  Pt recently discharged home w/ RW on 12/30/2020 following diagnosis of R cervical internal carotid artery dissection  Pt lives w/ three teenage children in [facility] w/ first floor set- up PTA  Pt completed ADL / IADL indepedently at baseline w/ out use of AD or DME  Pt has been using [facility] since DC home, and added that her mother is currently staying to assist as needed  Per neurosurgery, recommend no intervention and frequent neuro checks at this time  Personal factors impacting her performance include difficulty completing ADL, difficulty completing IADL, steps to enter  Upon eval, pt alert and oriented  Recalled therapist from previous admission, and able to participate in conversation appropriately  Pt completed bed mobility w/ mod I for + time, HOB elevated  Pt required S to complete LBD and mod I to complete UBD  Pt required S to complete functional transfers and short distance functional mobility using RW  Pt able to track object bilaterally w/ smooth pursuits, but + time and eye strain w/ saccadic eye movement (L>R)   Pt presents w/ decreased activity tolerance, decreased vision, decreased endurance, decreased R UE strength impacting her I w/ dressing, bathing, oral hygiene, clothing mgmt, functional transfers, functional mobility, activity engagement, community mobility, food prep / clean up  Pt completing ADL below baseline level of I and would benefit from OT while in acute care to address deficits  From an OT perspective, recommend discharge home to PLOF w/ OPOT when medically stable for discharge from acute care   Will continue to follow     OT Discharge Recommendation: Home with skilled therapy(OPOT)

## 2021-01-01 NOTE — UTILIZATION REVIEW
Initial Clinical Review    Admission: Date/Time/Statement:   Admission Orders (From admission, onward)     Ordered        12/31/20 1739  Place in Observation  Once                   Orders Placed This Encounter   Procedures    Place in Observation     Standing Status:   Standing     Number of Occurrences:   1     Order Specific Question:   Admitting Physician     Answer:   Vinton Skiff     Order Specific Question:   Level of Care     Answer:   Med Surg [[id number]]     ED Arrival Information     Expected Arrival Acuity Means of Arrival Escorted By Service Admission Type    - 12/31/2020 14:13 Emergent Walk-In Self Hospitalist Emergency    [facility]        Chief Complaint   Patient presents with   Deborah Molina     pt reports change in left eye vision   Arm Pain     left arm hurst from infiltrated IV  pt discharged yesterday for disection of carotid artery     Assessment/Plan: 41 y o  female with medical history significant for right internal carotid artery dissection who presents with left eye vision deficit started today with right upper extremity numbness that has been resolved now  Now she presented with new focal deficits concerning for stroke given her history of the internal carotid artery dissection  CT head was unremarkable and CTA head and neck was repeated showing worsening of the dissection therefore Neurology was consulted who recommended continuing Xarelto and aspirin and admitting the patient under stroke pathway as patient is at risk for stroke however they do not think that her current symptoms are related to the dissection and could be migraine headache  Neurology spoke with Neurosurgery who recommended MRI/MRA and continuing current anticoagulation plan who also thought that her ocular deficit could be migraine or related to dissection but patient is still a risk of stroke    Patient was not a candidate for tPA as she is on Xarelto and all of her deficits resolved now  Patient will be admitted to step-down 2 for observation under stroke pathway including monitoring on telemetry and neuro checks and Critical Care Team is aware  NEUROLOGY CONSULT  [facility]  as mentioned below (nonfocal exam, no visual field deficit on the left during attending's exam), CT head and CTA head/neck as mentioned above  Not a tPA candidate given active Xarelto use  MRI ordered, CTA to be reviewed with neurosurgery by attending this afternoon  NEUROSURGERY CONSULT  In my review, she has now developed likely complete occlusion or worsening critical stenosis of her cervical right ICA  The appearance of the diminutive petrous through supraclinoid segments is most likely due to poor perfusion causing vessel collapse rather than extension of the dissection flap intracranially  Symptoms are of unclear etiology, possibly ocular migraine/headache syndrome due to the dissection  Less likely but still significant chance that she embolized to her right ophthalmic artery or PCA territory  Regardless, she is still at risk of stroke therefore should maintain on dual therapy either dual antiplatelets or AC plus ASA  Obtain stat MRI brain and MRA head wo and MRA neck w/wo including T1FS sequences in the neck   Admit for at least 24 hours with frequent neuro checks  ADDENDUM ATTENDING  MRI brain with and without contrast, MRA carotids was also done with results, discussed the case with Neurosurgery team   Recommendation frequent neuro checks keep on aspirin and anticoagulant, no further recommendation right now she will be seen in and NSG office in a few weeks, notify Neurology with results as well, case was discussed with on-call Neurology, agree with neuro surgery plan, no further recommendation tonight  ED Triage Vitals   Temperature Pulse Respirations Blood Pressure SpO2   12/31/20 1447 12/31/20 1447 12/31/20 1447 12/31/20 1447 12/31/20 1447   (!) 97 3 °F (36 3 °C) 100 16 103/64 98 %      Temp Source Heart Rate Source Patient Position - Orthostatic VS BP Location FiO2 (%)   12/31/20 1447 12/31/20 1447 12/31/20 1447 12/31/20 1447 --   Oral Monitor Lying Left arm       Pain Score       01/01/21 0600       No Pain          Wt Readings from Last 1 Encounters:   12/31/20 74 8 kg (165 lb)     Additional Vital Signs:   Pertinent Labs/Diagnostic Test Results:   MRI BRAIN Right frontal focus of cortical acute to subacute ischemia  MRA BRAINNonvisualized right cavernous and petrous ICA consistent with known dissection  There is a constitution at the right ICA terminus  12/31 MRA Right cervical ICA dissection is not significantly changed from the prior CTA performed today   There is occlusion beginning at C2 level extending superiorly  Results from last 7 days   Lab Units 12/31/20  1530 12/30/20  0516 12/29/20  0519 12/28/20  0140 12/27/20  0446 12/26/20  1811   WBC Thousand/uL 8 42 6 60 6 41 7 57 7 14 8 17   HEMOGLOBIN g/dL 13 4 12 1 11 6 11 6 11 3* 11 4*   HEMATOCRIT % 42 5 38 4 37 1 36 7 35 7 36 6   PLATELETS Thousands/uL 286 263 267 249 245 265   NEUTROS ABS Thousands/µL  --   --   --  3 92  --  4 72     Results from last 7 days   Lab Units 12/31/20  1530 12/30/20  0516 12/29/20  0519 12/26/20  1811   SODIUM mmol/L 136 135* 137 142   POTASSIUM mmol/L 4 0 4 3 4 1 4 1   CHLORIDE mmol/L 98* 103 104 106   CO2 mmol/L 26 23 26 26   ANION GAP mmol/L 12 9 7 10   BUN mg/dL 12 10 7 11   CREATININE mg/dL 0 84 0 75 0 88 0 92   EGFR ml/min/1 73sq m 85 97 80 76   CALCIUM mg/dL 9 5 9 1 9 3 9 0     Results from last 7 days   Lab Units 12/26/20  1811   AST U/L 14   ALT U/L 21   ALK PHOS U/L 48   TOTAL PROTEIN g/dL 7 5   ALBUMIN g/dL 3 9   TOTAL BILIRUBIN mg/dL 0 18*     Results from last 7 days   Lab Units 12/31/20  1533   POC GLUCOSE mg/dl 87     Results from last 7 days   Lab Units 12/31/20  1530 12/30/20  0516 12/29/20  0519 12/26/20  1811   GLUCOSE RANDOM mg/dL 98 85 90 95     Results from last 7 days   Lab Units 12/31/20  1532 TROPONIN I ng/mL <0 02     Results from last 7 days   Lab Units 12/31/20  1530 12/28/20  0833 12/28/20  0140  12/26/20  2232   PROTIME seconds 13 2  --   --   --  13 8   INR  0 99  --   --   --  1 04   PTT seconds 22* 63* 88*   < > 25    < > = values in this interval not displayed       Results from last 7 days   Lab Units 12/27/20  1043   FERRITIN ng/mL 8     ED Treatment:   Medication Administration from 12/31/2020 1411 to 01/01/2021 0617       Date/Time Order Dose Route Action Action by Comments     12/31/2020 1525 iohexol (OMNIPAQUE) 350 MG/ML injection (SINGLE-DOSE) 85 mL 85 mL Intravenous Given Natacha Goes Brock      12/31/2020 1722 rivaroxaban (XARELTO) tablet 15 mg 15 mg Oral Given Marylou Gallegos RN      12/31/2020 2000 magnesium sulfate IVPB (premix) SOLN 1 g 0 g Intravenous Stopped Yosvany Franco RN      12/31/2020 1855 magnesium sulfate IVPB (premix) SOLN 1 g 1 g Intravenous [facility]      12/31/2020 1813 sodium chloride 0 9 % infusion 75 mL/hr Intravenous [address] Marylou Gallegos RN      12/31/2020 2348 atorvastatin (LIPITOR) tablet 40 mg 40 mg Oral Given Yosvany Franco RN      12/31/2020 2239 atorvastatin (LIPITOR) tablet 40 mg 0 mg Oral Hold Marylou Gallegos RN      12/31/2020 2130 Gadobutrol injection (SINGLE-DOSE) SOLN 7 mL 7 mL Intravenous Given [place]         Past Medical History:   Diagnosis Date    Asthma     ACTIVITY INDUCED ASTHMA    IBS (irritable bowel syndrome)      Present on Admission:   Internal carotid artery dissection (HCC)   Acute nonintractable headache      Admitting Diagnosis: TIA (transient ischemic attack) [G45 9]  Vision blurred [H53 8]  Carotid artery dissection (HCC) [I77 71]  Vision changes [H53 9]  Age/Sex: 41 y o  female  Admission Orders:  TELE MON  STEP DOWN LEVEL 2  PT OT  NEURO CHECKS Q HR  HGBA1C  Scheduled Medications:  aspirin, 81 mg, Oral, Daily  atorvastatin, 40 mg, Oral, QPM  cholecalciferol, 1,000 Units, Oral, Daily  magnesium sulfate, 1 g, Intravenous, BID  multivitamin stress formula, 1 tablet, Oral, Daily  rivaroxaban, 15 mg, Oral, Daily With Breakfast      Continuous IV Infusions:  sodium chloride, 100 mL/hr, Intravenous, Continuous      PRN Meds:       IP CONSULT TO NEUROLOGY  IP CONSULT TO PHYSICAL MEDICINE REHAB  IP CONSULT TO CASE MANAGEMENT  IP CONSULT TO NUTRITION SERVICES    Network Utilization Review Department  ATTENTION: Please call with any questions or concerns to [phone] and carefully listen to the prompts so that you are directed to the right person  All voicemails are confidential   Alexx Adam all requests for admission clinical reviews, approved or denied determinations and any other requests to dedicated fax number below belonging to the campus where the patient is receiving treatment   List of dedicated fax numbers for the Facilities:  [address] DENIALS (Administrative/Medical Necessity) [phone]   [address] (Maternity/NICU/Pediatrics) [phone] [address] [place] [address] Dr Mame Toro [phone] (  Swati Sanderson [facility] "Vandana" 103) [phone] [address] Gisell Price 1481 [address] [place]) [address] [place] [address] 951 [phone]

## 2021-01-01 NOTE — DISCHARGE INSTRUCTIONS
Ischemic Stroke, Ambulatory Care   GENERAL INFORMATION:   An ischemic stroke  happens when blood is suddenly blocked and cannot flow to your brain  The block is usually caused by a blood clot that gets stuck in a narrow blood vessel  When oxygen cannot get to an area of the brain, tissue in that area may get damaged  Damage to an area of the brain causes loss of body functions controlled by that area  Common symptoms include the following:   · Severe headache    · Blurred or double vision, or vision loss    · Numbness, tingling, weakness, or paralysis on 1 side of your body    · Trouble walking or communicating    · Dizziness, confusion, or fainting  How can I tell if someone is having a stroke? Know the F A S T  test to recognize the signs of a stroke:  · F = Face:  Ask the person to smile  Drooping on one side of the mouth or face is a sign of a stroke  · A = Arms:  Ask the person to raise both arms  One arm that slowly comes back down or cannot be raised is a sign of a stroke  · S = Speech:  Ask the person to repeat a simple sentence that you say first  Speech that is slurred or sounds strange is a sign of a stroke  · T = Time:  Call 911 if you see any of these signs  This is an emergency  Seek immediate care for the following symptoms:   · Seizure, double vision, or vision loss    · Nose bleed, coughing up blood, or bleeding from your rectum     · An arm or leg feels warm, tender, and painful    · Chest pain that spreads to your arms, jaw, or back    · Sudden dizziness, feeling lightheaded, and shortness of breath    · Weakness or numbness in your arm, leg, or face    · Confusion and problems speaking or understanding speech    · Severe headache or loss of balance or coordination  Treatment for ischemic stroke  may include any of the following:  · Antiplatelets , such as aspirin, help prevent blood clots  Take your antiplatelet medicine exactly as directed   These medicines make it more likely for you to bleed or bruise  If you are told to take aspirin, do not take acetaminophen or ibuprofen instead  · Anticoagulants    are a type of blood thinner medicine that helps prevent clots  Clots can cause strokes, heart attacks, and death  These medicines may cause you to bleed or bruise more easily  ¨ Watch for bleeding from your gums or nose  Watch for blood in your urine and bowel movements  Use a soft washcloth and a soft toothbrush  If you shave, use an electric razor  Avoid activities that can cause bruising or bleeding  ¨ Tell your healthcare provider about all medicines you take because many medicines cannot be used with anticoagulants  Do not start or stop any medicines unless your healthcare provider tells you to  Tell your dentist and other healthcare providers that you take anticoagulants  Wear a bracelet or necklace that says you take this medicine  ¨ You will need regular blood tests so your healthcare provider can decide how much medicine you need  Take anticoagulants exactly as directed  Tell your healthcare provider right away if you forget to take the medicine, or if you take too much  ¨ If you take warfarin, some foods can change how your blood clots  Do not make major changes to your diet while you take warfarin  Warfarin works best when you eat about the same amount of vitamin K every day  Vitamin K is found in green leafy vegetables, broccoli, grapes, and other foods  Ask for more information about what to eat when you take warfarin  · Other medicines  may be needed to treat diabetes, high cholesterol, or high blood pressure  You may also need medicine to decrease the pressure in your brain, reduce pain, or prevent seizures  Manage and prevent ischemic stroke:   · Go to therapy  Physical therapists help strengthen your arms, legs, and hands  You learn exercises to improve your balance and movement to decrease your risk of falling   Occupational therapists teach you new ways to do daily activities, such as getting dressed  A speech therapist helps you relearn or improve your ability to talk and swallow  · Eat a variety of healthy foods  Healthy foods include fruits, vegetables, whole-grain breads, low-fat dairy products, beans, lean meats, and fish  Eat foods that are low in fat, cholesterol, salt, and sugar  Eat at least 5 servings of fruits and vegetables each day  Eat foods that are high in potassium, such as potatoes and bananas  · Maintain a healthy weight  Weight loss can decrease your blood pressure and your risk of stroke  Ask your healthcare provider how much you should weigh and how to lose weight safely  Ask how often you should exercise and which exercises to do  · Do not drink alcohol  Alcohol thins the blood, which increases your risk of hemorrhagic stroke  · Do not use street drugs or smoke cigarettes  Your risk of another stroke increases if you use drugs such as cocaine, or you smoke cigarettes  Ask your healthcare provider if you need help quitting  Follow up with your healthcare provider as directed:  Write down your questions so you remember to ask them during your visits  CARE AGREEMENT:   You have the right to help plan your care  Learn about your health condition and how it may be treated  Discuss treatment options with your caregivers to decide what care you want to receive  You always have the right to refuse treatment  The above information is an  only  It is not intended as medical advice for individual conditions or treatments  Talk to your doctor, nurse or pharmacist before following any medical regimen to see if it is safe and effective for you  © 2014 [address] is for End User's use only and may not be sold, redistributed or otherwise used for commercial purposes  All illustrations and images included in CareNotes® are the copyrighted property of [employer]  or Michael Bates

## 2021-01-01 NOTE — ASSESSMENT & PLAN NOTE
Left-sided visual deficit and right upper extremity weakness/numbness that has been resolved  Patient was recently admitted for right extracranial internal carotid artery dissection and was discharged home on aspirin and Xarelto      · CT head:  Unremarkable  · CTA head and neck: Slightly worsened right cervical ICA dissection with occlusion at inferior C2 level, previously at level C1  · Neurology assessed the patient and recommended MRI/MRA and spoke with interventionalist Leah Mckinney from Neurosurgery who recommended imaging and continue current anticoagulation with Xarelto and aspirin  · MRI brain showing right frontal focus of cortical acute to subacute ischemia  · MRA right carotid internal carotid artery dissection is not significantly changed from prior CTA    Plan:  Admitted under stroke pathway  · Continue Xarelto and aspirin  · Continue statin statin  · lipid panel and hemoglobin A1c within normal range  · PT/OT eval:  Outpatient therapy  · PMR eval  · Monitor on telemetry:  No events  · Neurochecks  · Appreciate neurology recommendations and assistance

## 2021-01-01 NOTE — QUICK NOTE
MRI brain with and without contrast, MRA carotids was also done with results, discussed the case with Neurosurgery team   Recommendation frequent neuro checks keep on aspirin and anticoagulant, no further recommendation right now she will be seen in and NSG office in a few weeks, notify Neurology with results as well, case was discussed with on-call Neurology, agree with neuro surgery plan, no further recommendation tonight

## 2021-01-01 NOTE — DISCHARGE SUMMARY
Discharge- Flora Garciaant 1975, 41 y o  female MRN: [id number]    Unit/Bed#: S -01 Encounter: [id number]    Primary Care Provider: Peter Aiken MD   Date and time admitted to hospital: 12/31/2020  3:03 PM        * Ischemic stroke [facility])  Assessment & Plan  Left-sided visual deficit and right upper extremity weakness/numbness that has been resolved  Patient was recently admitted for right extracranial internal carotid artery dissection and was discharged home on aspirin and Xarelto  · CT head:  Unremarkable  · CTA head and neck: Slightly worsened right cervical ICA dissection with occlusion at inferior C2 level, previously at level C1  · Neurology assessed the patient and recommended MRI/MRA and spoke with interventionalist Dr Guzman from Neurosurgery who recommended imaging and continue current anticoagulation with Xarelto and aspirin  · MRI brain showing right frontal focus of cortical acute to subacute ischemia  · MRA right carotid internal carotid artery dissection is not significantly changed from prior CTA    Plan:  Admitted under stroke pathway  · Continue Xarelto and aspirin  · Continue statin  · lipid panel and hemoglobin A1c within normal range  · PT/OT eval:  Outpatient therapy  · Monitored on telemetry:  No events  · Appreciate neurology recommendations and assistance  · Patient to follow up with neurology and neurosurgery in office with repeat imaging ordered by neurosurgery in outpatient setting       Internal carotid artery dissection [facility])  Assessment & Plan  Patient had chiropractic procedure on the 20th of December and started to have headaches afterward with focal deficits therefore she was admitted and found to have right internal carotid artery dissection  She was discharged with Xarelto and aspirin with outpatient follow-up      · Repeat CT head and neck today showing worsening of the dissection  · Neurosurgery was consulted on prior admission we did not recommend any urgent intervention and follow-up in clinic in 1-2 weeks, neurology discussed the case with Neurosurgery today who recommended repeat imaging therefore consider consulting them if imaging results are significant  · See plan above    Acute nonintractable headacheresolved as of 1/1/2021  Assessment & Plan  Suspect to be migraine related per Neurology assessment however stroke is still on the differential list   Also could be related to carotid artery dissection  · Appreciate neurology recommendations  · Resolved       Discharging Resident Physician: Gopi Suazo MD  Attending: Girma Perez MD  PCP: Yael Silvestre MD  Admission Date: 12/31/2020  Discharge Date: 01/01/21    Disposition:     Home    Reason for Admission: left eye vision defecit    Consultations During Hospital Stay:  · Neurology    Procedures Performed:     · None    Significant Findings / Test Results:     Mra Head Wo Contrast    Result Date: 12/31/2020  Impression: Nonvisualized right cavernous and petrous ICA consistent with known dissection  There is a constitution at the right ICA terminus  Findings were marked as "immediate"in Epic and will now be related to the ordering physician or covering clinical team by the radiology liaison  Workstation performed: [id number]     Mra Carotids Wo And W Contrast    Result Date: 12/31/2020  Impression: Right cervical ICA dissection is not significantly changed from the prior CTA performed today  There is occlusion beginning at C2 level extending superiorly  Workstation performed: [id number]     Mri Brain Wo Contrast    Result Date: 12/31/2020  Impression: Right frontal focus of cortical acute to subacute ischemia  Findings were marked as "immediate"in Epic and will now be related to the ordering physician or covering clinical team by the radiology liaison  Workstation performed: [id number]     Ct Stroke Alert Brain    Result Date: 12/31/2020  Impression: No acute hemorrhage    Grossly stable CT of the brain  Findings were directly discussed with Hazel Orozco on 12/31/2020 3:32 PM  Workstation performed: [id number]     Cta Stroke Alert (head/neck)    Result Date: 12/31/2020  Impression: Slightly worsened right cervical ICA dissection with occlusion at inferior C2 level, previously at level C1  Presumed retrograde narrow caliber flow within remaining distal right cervical ICA as well as petrous and cavernous ICA  Focal high-grade stenosis at the right supraclinoid ICA proximal to ophthalmic artery origin, worse than prior exam   Suggested small intimal flap within the right supraclinoid ICA  Findings were directly discussed with Hazel Orozco on 12/31/2020 3:32 PM  Workstation performed: [id number]     Incidental Findings:   · none    Test Results Pending at Discharge (will require follow up):   · none     Outpatient Tests Requested:   CTA head and neck w wo contrast     Complications:  none    Hospital Course:     Jose Coronel is a 41 y o  female patient who originally presented to the hospital on 12/31/2020 due to left eye vision deficit and right upper extremity numbness that resolved upon arrival  Patient was recently admitted for headache after chiropractic maneuver and was diagnosed with right ICA dissection and was discharged with xarelto and aspirin with neurology and neurosurgery follow up in clinic  Due to presenting symptoms, there was concern for stroke however patient was not tpa candidate due to being on xarelto  Stroke alert was placed and Neurology was urgently consulted and patient was placed under stroke pathway  The case was discussed with neurosurgery who recommended MRI brain amd MRA head and neck with results mentioned above  No intervention was recommended by neurosurgery and they will follow up with patient in 2 week in clinic with repeat CTA head and neck around that time  Neurology cleared the patient for discharge as well   She will follow up with neurology in clinic and also with physical therapy  She will be discharged with aspirin, xarelto and statin  Patient is medically stable for discharge  Condition at Discharge: stable     Discharge Day Visit / Exam:     Subjective:  No new defecits and all symptoms resolved    Vitals: Blood Pressure: 119/58 (01/01/21 1500)  Pulse: 75 (01/01/21 1500)  Temperature: 98 8 °F (37 1 °C) (01/01/21 1500)  Temp Source: Oral (01/01/21 1500)  Respirations: 16 (01/01/21 1500)  Height: 5' 4" (162 6 cm) (12/31/20 1447)  Weight - Scale: 74 8 kg (165 lb) (12/31/20 1447)  SpO2: 99 % (01/01/21 1500)  Exam:   Physical Exam  Vitals signs and nursing note reviewed  Constitutional:       General: She is not in acute distress  Appearance: She is not diaphoretic  HENT:      Head: Normocephalic and atraumatic  Mouth/Throat:      Mouth: Mucous membranes are moist       Pharynx: Oropharynx is clear  Eyes:      General: No scleral icterus  Right eye: No discharge  Left eye: No discharge  Extraocular Movements: Extraocular movements intact  Conjunctiva/sclera: Conjunctivae normal       Pupils: Pupils are equal, round, and reactive to light  Neck:      Musculoskeletal: Normal range of motion and neck supple  Cardiovascular:      Rate and Rhythm: Normal rate and regular rhythm  Pulses: Normal pulses  Heart sounds: Normal heart sounds  No murmur  Pulmonary:      Effort: Pulmonary effort is normal  No respiratory distress  Breath sounds: Normal breath sounds  No wheezing or rales  Abdominal:      General: Bowel sounds are normal       Palpations: Abdomen is soft  Tenderness: There is no abdominal tenderness  Musculoskeletal: Normal range of motion  Right lower leg: No edema  Left lower leg: No edema  Lymphadenopathy:      Cervical: No cervical adenopathy  Skin:     General: Skin is warm and dry  Capillary Refill: Capillary refill takes less than 2 seconds     Neurological:      General: No focal deficit present  Mental Status: She is alert and oriented to person, place, and time  Cranial Nerves: No cranial nerve deficit  Sensory: No sensory deficit  Motor: No weakness  Coordination: Coordination normal    Psychiatric:         Mood and Affect: Mood normal          Behavior: Behavior normal          Discussion with Family: patient updated her family     Discharge instructions/Information to patient and family:   See after visit summary for information provided to patient and family  Provisions for Follow-Up Care:  See after visit summary for information related to follow-up care and any pertinent home health orders  Planned Readmission: no     Discharge Medications:  See after visit summary for reconciled discharge medications provided to patient and family        ** Please Note: This note has been constructed using a voice recognition system **

## 2021-01-01 NOTE — ASSESSMENT & PLAN NOTE
Suspect to be migraine related per Neurology assessment however stroke is still on the differential list   Also could be related to carotid artery dissection      · Appreciate neurology recommendations  · Resolved

## 2021-01-01 NOTE — DISCHARGE INSTR - AVS FIRST PAGE
Dear [facility],     It was our pleasure to care for you here at [facility]  It is our hope that we were always able to exceed the expected standards for your care during your stay  You were hospitalized due to left eye vision defecit  You were cared for on the 2nd floor by Raymond Tolliver MD under the service of Brittany De La Vega MD with the [facility] who covers for your primary care physician (PCP), Jenna Palma MD, while you were hospitalized  If you have any questions or concerns related to this hospitalization, you may contact us at [phone]  For follow up as well as any medication refills, we recommend that you follow up with your primary care physician  A registered nurse will reach out to you by phone within a few days after your discharge to answer any additional questions that you may have after going home  However, at this time we provide for you here, the most important instructions / recommendations at discharge:     · Notable Medication Adjustments -   · Continue statin started in the hospital and follow up with your PCP for refills  · Testing Required after Discharge -   · Imaging requested by neurosurgery  · Important follow up information -   · Follow up with your PCP  · Follow up with neurosurgery  · Follow up with neurology  · Follow up with Physical therapy  · Other Instructions -   · Refer to discharge instructions   · Please review this entire after visit summary as additional general instructions including medication list, appointments, activity, diet, any pertinent wound care, and other additional recommendations from your care team that may be provided for you        Sincerely,     Raymond Tolliver MD

## 2021-01-01 NOTE — PROGRESS NOTES
Progress Note Carl Mail 1975, 41 y o  female MRN: [id number]    Unit/Bed#: S -01 Encounter: [id number]    Primary Care Provider: Marylin Tatum MD   Date and time admitted to hospital: 12/31/2020  3:03 PM        * Ischemic stroke [facility])  Assessment & Plan  Left-sided visual deficit and right upper extremity weakness/numbness that has been resolved  Patient was recently admitted for right extracranial internal carotid artery dissection and was discharged home on aspirin and Xarelto  · CT head:  Unremarkable  · CTA head and neck: Slightly worsened right cervical ICA dissection with occlusion at inferior C2 level, previously at level C1  · Neurology assessed the patient and recommended MRI/MRA and spoke with interventionalist Cliff Salomon from Neurosurgery who recommended imaging and continue current anticoagulation with Xarelto and aspirin  · MRI brain showing right frontal focus of cortical acute to subacute ischemia  · MRA right carotid internal carotid artery dissection is not significantly changed from prior CTA    Plan:  Admitted under stroke pathway  · Continue Xarelto and aspirin  · Continue statin statin  · lipid panel and hemoglobin A1c within normal range  · PT/OT eval:  Outpatient therapy  · PMR eval  · Monitor on telemetry:  No events  · Neurochecks  · Appreciate neurology recommendations and assistance      Acute nonintractable headache  Assessment & Plan  Suspect to be migraine related per Neurology assessment however stroke is still on the differential list   Also could be related to carotid artery dissection      · Appreciate neurology recommendations  · Receiving 1 g of magnesium sulfate and 1 g of Depacon and reassess afterward  · Consider migraine cocktail if headache recur    Internal carotid artery dissection [facility])  Assessment & Plan  Patient had chiropractic procedure on the 20th of December and started to have headaches afterward with focal deficits therefore she was admitted and found to have right internal carotid artery dissection  She was discharged with Xarelto and aspirin with outpatient follow-up  · Repeat CT head and neck today showing worsening of the dissection  · Neurosurgery was consulted on prior admission we did not recommend any urgent intervention and follow-up in clinic in 1-2 weeks, neurology discussed the case with Neurosurgery today who recommended repeat imaging therefore consider consulting them if imaging results are significant  · See plan above          VTE Pharmacologic Prophylaxis:   Pharmacologic: Rivaroxaban (Xarelto)  Mechanical VTE Prophylaxis in Place: Yes    Discussions with Specialists or Other Care Team Provider:  Nursing    Education and Discussions with Family / Patient:  Patient    Current Length of Stay: 0 day(s)    Current Patient Status: Observation     Discharge Plan / Estimated Discharge Date:  Likely within 24 hours awaiting further neurology     Code Status: Prior      Subjective:   No new deficits  Still complains of headache being relieved by current regimen    Objective:     Vitals:   Temp (24hrs), Av 2 °F (36 8 °C), Min:97 3 °F (36 3 °C), Max:99 °F (37 2 °C)    Temp:  [97 3 °F (36 3 °C)-99 °F (37 2 °C)] 99 °F (37 2 °C)  HR:  [] 72  Resp:  [16-20] 18  BP: ()/(56-69) 103/57  SpO2:  [97 %-100 %] 97 %  Body mass index is 28 32 kg/m²  Input and Output Summary (last 24 hours): Intake/Output Summary (Last 24 hours) at 2021 1311  Last data filed at 2021 0643  Gross per 24 hour   Intake 100 ml   Output    Net 100 ml       Physical Exam:     Physical Exam  Vitals signs and nursing note reviewed  Constitutional:       General: She is not in acute distress  Appearance: She is not diaphoretic  HENT:      Head: Normocephalic and atraumatic  Mouth/Throat:      Mouth: Mucous membranes are moist       Pharynx: Oropharynx is clear  Eyes:      General: No scleral icterus          Right eye: No discharge  Left eye: No discharge  Extraocular Movements: Extraocular movements intact  Conjunctiva/sclera: Conjunctivae normal       Pupils: Pupils are equal, round, and reactive to light  Neck:      Musculoskeletal: Normal range of motion and neck supple  Cardiovascular:      Rate and Rhythm: Normal rate and regular rhythm  Pulses: Normal pulses  Heart sounds: Normal heart sounds  No murmur  Pulmonary:      Effort: Pulmonary effort is normal  No respiratory distress  Breath sounds: Normal breath sounds  No wheezing or rales  Abdominal:      General: Bowel sounds are normal       Palpations: Abdomen is soft  Tenderness: There is no abdominal tenderness  Musculoskeletal: Normal range of motion  Right lower leg: No edema  Left lower leg: No edema  Lymphadenopathy:      Cervical: No cervical adenopathy  Skin:     General: Skin is warm and dry  Capillary Refill: Capillary refill takes less than 2 seconds  Neurological:      General: No focal deficit present  Mental Status: She is alert and oriented to person, place, and time  Cranial Nerves: No cranial nerve deficit  Sensory: No sensory deficit  Motor: No weakness  Coordination: Coordination normal    Psychiatric:         Mood and Affect: Mood normal          Behavior: Behavior normal              Additional Data:     Labs:    Results from last 7 days   Lab Units 12/31/20  1530  12/28/20  0140   WBC Thousand/uL 8 42   < > 7 57   HEMOGLOBIN g/dL 13 4   < > 11 6   HEMATOCRIT % 42 5   < > 36 7   PLATELETS Thousands/uL 286   < > 249   NEUTROS PCT %  --   --  52   LYMPHS PCT %  --   --  33   MONOS PCT %  --   --  12   EOS PCT %  --   --  2    < > = values in this interval not displayed       Results from last 7 days   Lab Units 12/31/20  1530  12/26/20  1811   POTASSIUM mmol/L 4 0   < > 4 1   CHLORIDE mmol/L 98*   < > 106   CO2 mmol/L 26   < > 26   BUN mg/dL 12   < > 11   CREATININE mg/dL 0 84   < > 0 92   CALCIUM mg/dL 9 5   < > 9 0   ALK PHOS U/L  --   --  48   ALT U/L  --   --  21   AST U/L  --   --  14    < > = values in this interval not displayed  Results from last 7 days   Lab Units 12/31/20  1530   INR  0 99       * I Have Reviewed All Lab Data Listed Above  * Additional Pertinent Lab Tests Reviewed: Julio César 67 Admission Reviewed    Imaging:    Imaging Reports Reviewed Today Include:  MRI brain, MRA carotid artery  Imaging Personally Reviewed by Myself Includes:  None    Recent Cultures (last 7 days):           Last 24 Hours Medication List:   Current Facility-Administered Medications   Medication Dose Route Frequency Provider Last Rate    aspirin  81 mg Oral Daily Ebenezer Kinsey MD      atorvastatin  40 mg Oral QPM Ebenezer Kinsey MD      cholecalciferol  1,000 Units Oral Daily Ebenezer Kinsey MD      magnesium sulfate  1 g Intravenous BID Ebenezer Kinsey MD 0 g (12/31/20 2000)    multivitamin stress formula  1 tablet Oral Daily Ebenezer Kinsey MD      rivaroxaban  15 mg Oral Daily With Breakfast Ebenezer Kinsey MD      sodium chloride  100 mL/hr Intravenous Continuous Jessie Gunn  mL/hr (01/01/21 0779)        Today, Patient Was Seen By: Kin Harrington MD    ** Please Note: This note has been constructed using a voice recognition system   **

## 2021-01-01 NOTE — QUICK NOTE
I spoke at length with Emmanuelle Strauss via telephone about her rt extracranial carotid dissection  I explained that the dissection has resulted in the occlusion of this segment and that there is intracranial ica flow which is minimal and coming from the collaterals  I explained that her rt frontal punctate cva is likely asymptomatic and likely she had an ocular migraine in the left eye  Her left eye supratemporal field kaleidoscope like presentation resolved prior to sleeping last night and hasn't returned  She is not a migraineur and likely her headaches, including various presentations are likely related to this dissection  Iatrogenic induced from chiropractic manipulation  There is no gold standard for treatment and can stay on the xarelto and aspirin combination  Lower suspicion for further developments of strokes given the occlusion  We talked about no exercises regarding her neck and no lifting weights or anything that will cause further strain until the dissection has healed  She is healthy without vascular risk factors and has normal intracranial and extracranial vasculature aside for the dissection leading to minimal rt ica intracranial flow  Good colaterals are keeping her from developing further strokes and per my conversation with medicine resident and patient herself she is without neurologic deficit  Therefore she is stable for discharge with repeat cta head/neck and outpt follow up in two weeks with neurosurgery  She can also follow up with neurology team regarding the headaches and I have instructed her to call our office on Monday to set that up  I have recommended gabapentin 100 mg po tid for her headaches and riboflavin 100 mg po bid  She already takes magnesium 1000 mg daily she states  Also tizanidine 2 mg po qhs, 2 mg po bid prn that can help with her cervicalgia

## 2021-01-01 NOTE — ED NOTES
Report called to receiving RN, Myriam Gonzalez  Pt to be coming up to unit shortly        Andry Pinto RN  01/01/21 2176

## 2021-01-01 NOTE — QUICK NOTE
· Pt with likely complete occlusion or worsening critical stenosis of her cervical right ICA  Pt with prior right ICA dissection  · Recommend pt maintain DAPT  · Pt will have close outpatient follow up with neurosurgery in 2 weeks with repeat CTA head/neck w wo  · Please contact nsx with any questions or concerns

## 2021-01-01 NOTE — OCCUPATIONAL THERAPY NOTE
Occupational Therapy Evaluation     Patient Name: Zafar Young  HTOHQ'H Date: 1/1/2021  Problem List  Principal Problem:    Stroke-like symptoms  Active Problems:    Internal carotid artery dissection (HCC)    Acute nonintractable headache    Past Medical History  Past Medical History:   Diagnosis Date    Asthma     ACTIVITY INDUCED ASTHMA    IBS (irritable bowel syndrome)      Past Surgical History  Past Surgical History:   Procedure Laterality Date    ABDOMINAL SURGERY      LAPROSCOPY FOR ENDOMETRIOSIS    CERVICAL BIOPSY  W/ LOOP ELECTRODE EXCISION      FINGER SURGERY      LEFT MIDDLE FINGER SURGERY 5 YRS AGO    TUBAL LIGATION           01/01/21 0938   OT Last Visit   OT Visit Date 01/01/21  (Friday)   Note Type   Note type Evaluation   Restrictions/Precautions   Weight Bearing Precautions Per Order No   Other Precautions Telemetry; Fall Risk;Pain   Pain Assessment   Pain Assessment Tool 0-10   Pain Score No Pain   Home Living   Type of Home House   Home Layout Two level;Performs ADLs on one level; Able to live on main level with bedroom/bathroom;Stairs to enter with rails   Bathroom Shower/Tub Tub/shower unit   Bathroom Toilet Standard   Bathroom Equipment Other (Comment)  (no DME)   [address] [place] [address]; Other (Comment)  (recent DC home w/ RW)   Additional Comments Pt reports living w/ 3 teenage children in [address] [place] [address] w/ first floor bedroom, bathroom  Pt added that her mother is currently staying to assist as needed   Prior Function   Level of [place] Independent with ADLs and functional mobility   Lives With Family;Son;Daughter   Receives Help From Family; Outpatient therapy  (scheduled Outpt therapy for Thursday)   ADL Assistance Independent   IADLs Independent   Falls in the last 6 months 0   Vocational Full time employment   Comments Pt reports I w/ ADL/ IADL at baseline prior to recent admission   Pt reports using RW for functional mobility and family assist w/ IADL  Pt able to complete self care ADL w/ S and + time   Lifestyle   Autonomy Pt reports I w/ ADL/ IADL at baseline prior to recent admission  Reciprocal Relationships Pt reports living w/ 3 teenage children  Pt added that her mother is currently staying to assist as needed   Service to Others 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  Also going to graduate school   Intrinsic Gratification Pt reports enjoying active lifestyle, yoga  Psychosocial   Psychosocial (WDL) WDL  (cooperative, pleasant, and motivated)   Subjective   Subjective "I did not get up to room until 5AM so I did not sleep well"   ADL   Where Assessed Edge of bed  (vs commode)   Eating Assistance 7  Independent   Eating Deficit Setup   Grooming Assistance 6  Modified Independent  (seated at tray table)   Grooming Deficit Setup; Increased time to complete   UB Bathing Assistance   (anticipate mod I seated based on fxal obs skills)   LB Bathing Assistance   (anticipate S based of fxal obs of skills)   UB Dressing Assistance 6  Modified independent   UB Dressing Deficit Setup; Increased time to complete   LB Dressing Assistance 5  Supervision/Setup   LB Dressing Deficit Setup; Requires assistive device for steadying; Increased time to complete   Toileting Assistance  5  Supervision/Setup   Toileting Deficit Setup; Bedside commode; Increased time to complete   Bed Mobility   Supine to Sit 6  Modified independent   Additional items Assist x 1;HOB elevated; Increased time required   Sit to Supine Unable to assess   Additional Comments Pt seated OOB in chair post eval w/ needs met, call bell in reach  Transfers   Sit to Stand 5  Supervision   Additional items Assist x 1; Increased time required  (instruction for hand placement)   Stand to Sit 5  Supervision   Additional items Assist x 1; Increased time required; Bedrails;Armrests; Verbal cues  (instruction for hand placement)   Toilet transfer 5  Supervision   Additional items Commode; Increased time required;Armrests; Bedrails;Assist x 1   Functional Mobility   Functional Mobility 5  Supervision   Additional Comments + time   Additional items Rolling walker   Balance   Static Sitting Good   Dynamic Sitting Fair   Static Standing Fair -   Ambulatory Fair -   Activity Tolerance   Activity Tolerance Patient limited by fatigue   Nurse Made Aware per RNGrace appropriate to see pt   RUE Assessment   RUE Assessment WFL   RUE Strength   RUE Overall Strength Deficits; Other (Comment)  (grossly 3+/5)   Edema   RUE Edema +1  (at elbow, proximal forearm)   LUE Assessment   LUE Assessment WFL   LUE Strength   LUE Overall Strength Within Functional Limits - able to perform ADL tasks with strength   Hand Function   Gross Motor Coordination Functional  (R hand dominance)   Fine Motor Coordination Functional  (able to oppose digits to thumb)   Sensation   Light Touch No apparent deficits  (B UE to light touch)   Sharp/Dull Not tested   Additional Comments Pt reports R UE numbness / tingling has resolved   Vision-Basic Assessment   Current Vision Does not wear glasses   Vision - Complex Assessment   Ocular Range of Motion WFL   Head Position WDL   Tracking Able to track stimulus in all quads without difficulty   Saccades   (pt reports "strained", + effort)   Acuity Able to read clock/calendar on wall without difficulty; Able to read employee name badge without difficulty  (able to manage cell phone)   Additional Comments Pt reports visual changes L eye have resolved  Pt reports fatigue, + effort / strain and discomfort w/ saccadic movements espeically vertically   Perception   Inattention/Neglect Appears intact   Motor Planning Appears intact   Perseveration Not present   Cognition   Overall Cognitive Status WFL   Arousal/Participation Alert; Cooperative   Attention Within functional limits   Orientation Level Oriented X4   Memory Within functional limits   Following Commands Follows all commands and directions without difficulty   Comments Identified pt by full name and birthdate  Pt known to this therapist from previous OT eval  Pt recalled therapist and able to participate in conversation appropriately; follow all directions  Assessment   Limitation Decreased high-level ADLs; Decreased self-care trans;Decreased ADL status; Decreased endurance;Visual deficit   Assessment Pt is a 40 yo female admitted [facility] on 12/31/2020  Pt presents w/ stroke - like symptoms  Pt recently discharged home w/ RW on 12/30/2020 following diagnosis of R cervical internal carotid artery dissection  Pt lives w/ three teenage children in [facility] w/ first floor set- up PTA  Pt completed ADL / IADL indepedently at baseline w/ out use of AD or DME  Pt has been using ARW since DC home, and added that her mother is currently staying to assist as needed  Per neurosurgery, recommend no intervention and frequent neuro checks at this time  Personal factors impacting her performance include difficulty completing ADL, difficulty completing IADL, steps to enter  Upon eval, pt alert and oriented  Recalled therapist from previous admission, and able to participate in conversation appropriately  Pt completed bed mobility w/ mod I for + time, HOB elevated  Pt required S to complete LBD and mod I to complete UBD  Pt required S to complete functional transfers and short distance functional mobility using RW  Pt able to track object bilaterally w/ smooth pursuits, but + time and eye strain w/ saccadic eye movement (L>R)  Pt presents w/ decreased activity tolerance, decreased vision, decreased endurance, decreased R UE strength impacting her I w/ dressing, bathing, oral hygiene, clothing mgmt, functional transfers, functional mobility, activity engagement, community mobility, food prep / clean up  Pt completing ADL below baseline level of I and would benefit from OT while in acute care to address deficits   From an OT perspective, recommend discharge home to PLOF w/ OPOT when medically stable for discharge from acute care  Will continue to follow   Goals   Patient Goals Pt stated that she would like to return home and be able to care for herself and walk  Pt added that she would like to resume baseline activities w/ in few weeks   Plan   Treatment Interventions ADL retraining;Visual perceptual retraining;UE strengthening/ROM; Endurance training;Patient/family training;Equipment evaluation/education;Continued evaluation; Energy conservation; Activityengagement   Goal Expiration Date 01/08/21   OT Frequency 1-2x/wk   Recommendation   OT Discharge Recommendation Home with skilled therapy  (OPOT)   Barthel Index   Feeding 10   Bathing 0   Grooming Score 5   Dressing Score 5   Bladder Score 10   Bowels Score 10   Toilet Use Score 5   Transfers (Bed/Chair) Score 10   Mobility (Level Surface) Score 0   Stairs Score 0   Barthel Index Score 55   Modified [place] Scale   Modified [place] Scale 3     Pt goals to be met by 1/8/21:  -Pt will complete bed mobility supine <> sit independently to return home to PLOF and baseline level of I    -Pt will complete UBD/LBD w/ mod I    -Pt will consistently engage in functional mobility household distances using AD as needed w/ mod I to max I w/ ADL performance to return home and assist w/ meal prep    -Pt will demonstrate increased functional standing tolerance for at least 10 minutes while engaged in ADL tasks standing at sink using AD as needed to max I w/ IADL performance and functional mobility to return home    -Pt will demonstrate good attention and understanding EC tech to max I w/ ADL performance    -Pt will demonstrate good attention and participation in continued evaluation to assess functional vision to assist in safe discharge planning    -Pt will demonstrate good attention and understanding compensatory strategies for visual deficits to max I and improve activity engagement    [facility], OTR/L

## 2021-01-05 ENCOUNTER — TELEPHONE (OUTPATIENT)
Dept: NEUROLOGY | Facility: CLINIC | Age: 46
End: 2021-01-05

## 2021-01-05 ENCOUNTER — TELEPHONE (OUTPATIENT)
Dept: NEUROSURGERY | Facility: CLINIC | Age: 46
End: 2021-01-05

## 2021-01-05 ENCOUNTER — TELEPHONE (OUTPATIENT)
Dept: INTERNAL MEDICINE CLINIC | Facility: CLINIC | Age: 46
End: 2021-01-05

## 2021-01-05 DIAGNOSIS — R51.9 HEADACHE: Primary | ICD-10-CM

## 2021-01-05 NOTE — TELEPHONE ENCOUNTER
2021-CALLED PT, CONFIRMED 01/15/2021CTA APT AND 2021 F/U APT W/PT     ----- Message from Nicho Palmer PA-C sent at 2021  2:08 PM EST -----  Regardin wk apt instead of 1 week  Hello,     Pt has apt with Dr Shoaib Garzon in 1 week  Please d/c apt and reschedule it for 2 weeks either as Snpx with Dr Shoaib Garzon or [address] with CTA Head and neck w wo  Thanks

## 2021-01-05 NOTE — TELEPHONE ENCOUNTER
Spoke to pt  Scheduled HFU for  Wed 2/20/21  Dr Dago Arriaga  In [facility]  Pt has Kee Torres MA  Which req Phys Order      Requested it today, gave form to Davin Black with a note to let me know when it is received,so I can put the pt on the [facility]    Sent out NP packet

## 2021-01-05 NOTE — TELEPHONE ENCOUNTER
At 9:54 AM, [facility] called about Leonel Valdez's TiZANidine (ZANAFLEX) 2 MG capsule  The medication was not covered by her insurance, so they had suggested TiZANidine tablets over the capsules  They would like to know if the tablets would be an acceptable substitute as it is covered by her insurance

## 2021-01-05 NOTE — TELEPHONE ENCOUNTER
[facility] called about Nicolas Valdez's TiZANidine (ZANAFLEX) 2 MG capsule  [facility] has stated TiZANidine (ZANAFLEX) 2 MG capsule as not being covered by [employer]  They would like to know if TiZANidine (ZANAFLEX) tablets would be an acceptable substitute as it is covered by her insurance

## 2021-01-05 NOTE — TELEPHONE ENCOUNTER
Irvin Cunningham, the substitution is ok, but she is not our patient  She was seen by one of our residents in the hospital, if she needs a new prescription please contact the resident who prescribed that or she can contact her PCP  Thank you!

## 2021-01-06 RX ORDER — TIZANIDINE 2 MG/1
2 TABLET ORAL EVERY 8 HOURS PRN
Qty: 30 TABLET | Refills: 0 | Status: SHIPPED | OUTPATIENT
Start: 2021-01-06 | End: 2021-03-01

## 2021-01-07 ENCOUNTER — APPOINTMENT (OUTPATIENT)
Dept: OCCUPATIONAL THERAPY | Facility: CLINIC | Age: 46
End: 2021-01-07
Payer: COMMERCIAL

## 2021-01-08 ENCOUNTER — TELEPHONE (OUTPATIENT)
Dept: NEUROLOGY | Facility: CLINIC | Age: 46
End: 2021-01-08

## 2021-01-08 NOTE — TELEPHONE ENCOUNTER
I would defer the anticoagulant management to neurovascular surgeon, it looks like Dr Berenice Don saw the patient and this patient was on Xarelto prior to the admission at this dosage, you may want to reach out to him for his guidance regarding the anticoagulation      Thank you

## 2021-01-08 NOTE — TELEPHONE ENCOUNTER
Discussed with Dr Dickey Bolds our stroke specialist, patient for dissection ideally can be on Xarelto 20 mg once a day with food, we were not sure why patient was also on aspirin until unless it was started initially and then forgotten to be taken off, discussed with patient's family physician, advised her that we do not need Xarelto 15 mg twice a day, and patient can just be anticoagulated with Xarelto 20 mg once a day with food  for the dissection

## 2021-01-08 NOTE — TELEPHONE ENCOUNTER
Dr Reymundo Han calling to check on the status  Made her aware that we are waiting Dr Gomez Levels recommendation and then will reach out to her about the Xarelto dose  Asking if this could be completed today because we are going into the weekend and this patient has been readmitted once and wants to ensure that she is not under dosed

## 2021-01-08 NOTE — TELEPHONE ENCOUNTER
I have reached out to Dr Jaiden Sebastian to get his input regarding the Xarelto dosage, we will wait for his response and then advise the PCP      Thank you

## 2021-01-08 NOTE — TELEPHONE ENCOUNTER
Patient will be a new Dr Galileo Tolentino patient 2/10  Dr Shirley Costa, PCP, calling in  She states that patient was in the hospital d/t carotid artery dissection and was discharged on Xarelto 15mg once daily  She states that normally it is dosed at Xarelto 15mg, 1 tab BID for 21 days  She is questioning if there is a specific reason or if she could have some guidance on what to do  She did reach out to hospitalist and [facility] and was told to contact Neuro  Dr Galileo Tolentino, could you review patient's case and give guidance to Dr Shirley Costa? Her Cell phone # is [phone]

## 2021-01-13 ENCOUNTER — TELEPHONE (OUTPATIENT)
Dept: NEUROSURGERY | Facility: CLINIC | Age: 46
End: 2021-01-13

## 2021-01-13 NOTE — TELEPHONE ENCOUNTER
Received a call from Hansa Islas reporting that she met with neurology at [facility] and they have suggested that she cancel her fup with [facility] at this time as there is no surgical intervention for her  Also indicated she was advise a CTA this soon after her last is unlikely to show different results and is unnecessary  Discussed with Dr Chuck Mckinney who is scheduled to see her on 1/18  He said he prefers to do an early interval and then delayed CTA this is why a repeat was suggested prior to her fup  Explained this to her and suggested she consider which provider she wished to proceed with before canceling  She will require reschedule of CTA and possible moving OV back depending on when she can get this done  She will contact the office back once she has made a decision about how she wishes to proceed

## 2021-01-15 ENCOUNTER — HOSPITAL ENCOUNTER (OUTPATIENT)
Dept: CT IMAGING | Facility: HOSPITAL | Age: 46
Discharge: HOME | End: 2021-01-15
Payer: COMMERCIAL

## 2021-01-15 DIAGNOSIS — I77.71 INTERNAL CAROTID ARTERY DISSECTION (HCC): ICD-10-CM

## 2021-01-15 DIAGNOSIS — I65.21 STENOSIS OF RIGHT CAROTID ARTERY: ICD-10-CM

## 2021-01-15 PROCEDURE — 70498 CT ANGIOGRAPHY NECK: CPT

## 2021-01-15 PROCEDURE — G1004 CDSM NDSC: HCPCS

## 2021-01-15 PROCEDURE — 70496 CT ANGIOGRAPHY HEAD: CPT

## 2021-01-15 RX ADMIN — IOHEXOL 85 ML: 350 INJECTION, SOLUTION INTRAVENOUS at 09:51

## 2021-01-18 ENCOUNTER — TRANSCRIBE ORDERS (OUTPATIENT)
Dept: NEUROLOGY | Facility: CLINIC | Age: 46
End: 2021-01-18

## 2021-01-18 ENCOUNTER — OFFICE VISIT (OUTPATIENT)
Dept: NEUROSURGERY | Facility: CLINIC | Age: 46
End: 2021-01-18
Payer: COMMERCIAL

## 2021-01-18 VITALS
BODY MASS INDEX: 27.66 KG/M2 | WEIGHT: 162 LBS | RESPIRATION RATE: 16 BRPM | SYSTOLIC BLOOD PRESSURE: 112 MMHG | DIASTOLIC BLOOD PRESSURE: 68 MMHG | HEART RATE: 82 BPM | TEMPERATURE: 98.7 F | HEIGHT: 64 IN

## 2021-01-18 DIAGNOSIS — G43.909 MIGRAINE, UNSPECIFIED, NOT INTRACTABLE, WITHOUT STATUS MIGRAINOSUS: ICD-10-CM

## 2021-01-18 DIAGNOSIS — I77.71 INTERNAL CAROTID ARTERY DISSECTION (HCC): Primary | ICD-10-CM

## 2021-01-18 DIAGNOSIS — I63.9 ISCHEMIC STROKE (HCC): ICD-10-CM

## 2021-01-18 DIAGNOSIS — I63.9 ISCHEMIC STROKE (HCC): Primary | ICD-10-CM

## 2021-01-18 DIAGNOSIS — I77.71 DISSECTION OF CAROTID ARTERY (HCC): ICD-10-CM

## 2021-01-18 PROCEDURE — 99204 OFFICE O/P NEW MOD 45 MIN: CPT | Performed by: NEUROLOGICAL SURGERY

## 2021-01-18 NOTE — PROGRESS NOTES
Patient Id: Flora Parson is a 41 y o  female        Handedness: Right      Assessment/Plan:    Diagnoses and all orders for this visit:    Internal carotid artery dissection ([address] )  -     CTA head and neck w wo contrast; Future    Ischemic stroke ([address] )  -     CTA head and neck w wo contrast; Future        Discussion Summary:   1  Right carotid dissection, likely secondary to neck manipulation, with small punctate stroke  We discussed the natural history and diagnosis of carotid dissection and stroke  Her most recent CTA shows slight improvement in caliber of the vessel in her cervical carotid  She remains on Xarelto and aspirin  Symptom wise she has had a large improvement however she does have some slight imbalance and left-sided complaints  Her numbness has resolved  If this juncture I would like her to follow up 3 months after her injury with a repeat CTA in her head and neck  I would like her to continue on anticoagulation for at least a total of 3 months as well as aspirin therapy  We discussed that aspirin therapy will likely continue for minimum of 1 year after her injury the dosage will be determined after imaging  Chief Complaint: Follow-up      HPI:   This is a very pleasant 30-year-old female who presented to the hospital after neck pain and headaches that began immediately after neck manipulation and worsened over the course of several days  CTA was performed which demonstrated a right-sided carotid dissection and occlusion from proximal cervical segment to the skull base  Ultimately she was started on anticoagulation and aspirin due to a lack of neurologic findings  Unfortunately she was discharged the hospital and readmitted with worsening symptoms in vision changes  An MRI and MRA was done at that time which showed a right frontal small punctate emboli/DWI hit  Her symptoms have slowly improved  She is beginning physical therapy this week    She denies any new numbness tingling, weakness, or balance difficulties at the current time  Her past medical history significant for hypertension  Past surgical history significant for D&C, tubal ligation, and finger surgery  She is not   She has 3 children  She denies any history of alcohol tobacco or illicit drug use  She is a   There is no significant family history of stroke or other carotid vascular diseases  Review of systems obtained by the MA reviewed and updated below  Review of Systems   Constitutional: Negative  HENT: Positive for tinnitus (every so often)  Eyes: Positive for visual disturbance (a little since her discharge from hospital but getting better- hard time focuing on things)  Respiratory: Negative  Cardiovascular: Negative  Gastrointestinal: Negative  Endocrine: Negative  Genitourinary: Negative  Musculoskeletal: Positive for back pain (shoulders/upper back, probably from sleeping) and gait problem (walking long distances)  Negative for myalgias and neck pain  Skin: Negative  Allergic/Immunologic: Negative  Neurological: Positive for speech difficulty (finds thats shes losses her thought sometimes), light-headedness (when she stands up too quickly), numbness (left arm but not for the last 3 days) and headaches (some since discharge)  Negative for dizziness, tremors, seizures and weakness  Hematological: Negative  Psychiatric/Behavioral: Negative  Physical Exam  Vitals:    01/18/21 1447   BP: 112/68   Pulse: 82   Resp: 16   Temp: 98 7 °F (37 1 °C)   She is well appearing  Affect is appropriate  Body mass index is 27 81 kg/m²  Khurram Fernandes She is awake alert and oriented  Hearing and vision are grossly intact  Her pupils are equal round reactive to light  Her extraocular movements are intact  Her face is symmetric  Tongue is midline  Facial sensation is intact and symmetric throughout  Shoulder shrug is 5/5  There is no drift or dysmetria       She has full strength in her bilateral upper and lower extremities  She has normal muscle tone muscle bulk  Her biceps reflexes and patellar reflexes are 2+ and symmetric  Corby sign negative bilaterally  Sensation intact to light touch and pinprick throughout  Her gait is normal      Her heart rate is regular  Her breath sounds are clear    2+ radial pulses       The following portions of the patient's history were reviewed and updated as appropriate: allergies, current medications, past family history, past medical history, past social history, past surgical history and problem list     Active Ambulatory Problems     Diagnosis Date Noted    Hydronephrosis, right 12/20/2016    Internal carotid artery dissection ([facility] ) 12/26/2020    Anemia 12/26/2020    Vision abnormalities 12/31/2020    Ischemic stroke ([facility] ) 12/31/2020     Resolved Ambulatory Problems     Diagnosis Date Noted    Acute nonintractable headache 12/26/2020     Past Medical History:   Diagnosis Date    Asthma     IBS (irritable bowel syndrome)        Past Surgical History:   Procedure Laterality Date    ABDOMINAL SURGERY      LAPROSCOPY FOR ENDOMETRIOSIS    CERVICAL BIOPSY  W/ LOOP ELECTRODE EXCISION      FINGER SURGERY      LEFT MIDDLE FINGER SURGERY 5 YRS AGO    TUBAL LIGATION           Current Outpatient Medications:     aspirin (ECOTRIN LOW STRENGTH) 81 mg EC tablet, Take 1 tablet (81 mg total) by mouth daily, Disp: 30 tablet, Rfl: 0    atorvastatin (LIPITOR) 40 mg tablet, Take 1 tablet (40 mg total) by mouth every evening, Disp: 30 tablet, Rfl: 0    CHLOROPHYLL PO, Take 1 Dose by mouth daily, Disp: , Rfl:     LYSINE PO, Take 1 tablet by mouth daily, Disp: , Rfl:     multivitamin (THERAGRAN) TABS, Take 1 tablet by mouth daily, Disp: , Rfl:     NON FORMULARY, Floradix Iron supplement, Disp: , Rfl:     rivaroxaban (XARELTO) 15 mg tablet, Take 1 tablet (15 mg total) by mouth daily with breakfast for 21 days, Disp: 21 tablet, Rfl: 0    b complex vitamins tablet, Take 1 tablet by mouth daily, Disp: , Rfl:     cholecalciferol (VITAMIN D3) 1,000 units tablet, Take 1,000 Units by mouth daily, Disp: , Rfl:     gabapentin (NEURONTIN) 100 mg capsule, Take 1 capsule (100 mg total) by mouth 3 (three) times a day as needed (headache) (Patient not taking: Reported on 1/18/2021), Disp: 30 capsule, Rfl: 0    Spirulina 500 MG TABS, Take 1 tablet by mouth daily, Disp: , Rfl:     tiZANidine (ZANAFLEX) 2 mg tablet, Take 1 tablet (2 mg total) by mouth every 8 (eight) hours as needed for muscle spasms (Patient not taking: Reported on 1/18/2021), Disp: 30 tablet, Rfl: 0    UNKNOWN TO PATIENT, , Disp: , Rfl:     UNKNOWN TO PATIENT, , Disp: , Rfl:     UNKNOWN TO PATIENT, , Disp: , Rfl:     UNKNOWN TO PATIENT, , Disp: , Rfl:     Results/Data: We reviewed her CTA and MRI in detail

## 2021-01-19 ENCOUNTER — EVALUATION (OUTPATIENT)
Dept: PHYSICAL THERAPY | Facility: CLINIC | Age: 46
End: 2021-01-19
Payer: COMMERCIAL

## 2021-01-19 ENCOUNTER — EVALUATION (OUTPATIENT)
Dept: OCCUPATIONAL THERAPY | Facility: CLINIC | Age: 46
End: 2021-01-19
Payer: COMMERCIAL

## 2021-01-19 DIAGNOSIS — I63.9 ISCHEMIC STROKE (HCC): ICD-10-CM

## 2021-01-19 DIAGNOSIS — I77.71 DISSECTION OF CAROTID ARTERY (HCC): Primary | ICD-10-CM

## 2021-01-19 DIAGNOSIS — I77.71 DISSECTION OF CAROTID ARTERY (HCC): ICD-10-CM

## 2021-01-19 PROCEDURE — 97112 NEUROMUSCULAR REEDUCATION: CPT | Performed by: PHYSICAL THERAPIST

## 2021-01-19 PROCEDURE — 97163 PT EVAL HIGH COMPLEX 45 MIN: CPT | Performed by: PHYSICAL THERAPIST

## 2021-01-19 PROCEDURE — 97166 OT EVAL MOD COMPLEX 45 MIN: CPT

## 2021-01-19 PROCEDURE — 97530 THERAPEUTIC ACTIVITIES: CPT

## 2021-01-19 NOTE — PROGRESS NOTES
OCCUPATIONAL THERAPY INITIAL EVALUATION:    2021  Yu Prieto  1975  [id number]  Sonal Bruner MD  1  Dissection of carotid artery ([facility] [place] [address] )    2  Ischemic stroke ([facility] )          NO OVERHEAD LIFTING, NO LIFTING MORE THAN 5-10 LBS    Subjective    "I was completely healthy before all this"    PATIENT GOAL: "I would like to get back to my schooling/studying and finish my masters"      Assessment/Plan    Skilled Analysis:  Pt is a 41 y o  female referred to Occupational Therapy s/p Dissection of carotid artery ([facility] ) [I77 71] s/p carotid artery dissection  Pt participated in skilled OT evaluation and following formalized testing, presents with the following areas of deficit: endurance, activity tolerance, balance, decreased I w/ ADLS/IADLS, strength, visual deficits and poor screen tolerance, dec visual tolerance (static & dynamic)   Pt does demo the need for skilled Occupational Therapy services 2x/week for 6-8 weeks with focus on /VM skills, tolerance, return to work/school, and HEP development to address the goals as listed below  POC to  w/in 90 days  Education provided on OT POC and HEP development       Goals:    Short Term Goals (4-6wks)    VISION      · Pt will tolerate oculomotor skills for improved saccades, pursuits, con/divergent tasks for improved reading tolerance  and computer/screen tasks  with minimal increase in symptoms      · Pt will increase oculomotor control and tolerance for improved dynamic activities with head turns, board/screen to table tasks for 30 min with minimal increase in symptoms for improved school, life and work roles      · Pt will demo ability to tolerate x 30minutes of screen time with minimal increase in symptoms to inc engagement and tolerance for school and work roles and salient tasks     · Pt will tolerate multi-modal envt x 30 min with cog load and min increase of symptoms  (2 levels or less) in HA/dizziness/nausea     · Pt will demo good carryover of self calming strategies for hypersensitivities for HA management and improved tolerance of cog load tasks          MOTOR    · Pt will increase L UE strength to 4+/5 and  strength R=L, through the use of strengthening exercises and home program for eventual return to school, life and work roles and salient tasks    · Pt will demo with G carryover of Home Exercise Program to improve functional progression towards goals in Plan of care and for improved functional use of RUE         Treatment Interventions  HEP development - theraputty  Visual tolerance tasks    - reading, board to table, screen to table, BITS, multidirectional saccades, pursuits    Multimatrix for saccades/ visual clutter/attention  Hypersensitivity strategies education  Multi-modal environment  Tracking tube  Oculomotor control:  saccades, convergence  Conv /div  Dynamic tasks  Edu on cog/vision apps  Millie marii scanning sheets      HISTORY OF PRESENT ILLNESS:     Pt is a 41 y o  female who was referred to Occupational Therapy s/p  Dissection of carotid artery ([facility] [address] ) [I77 71]  Pt reports that she had chiropractic procedure on the 20th of December including a neck manipulation and started to have headaches afterward (day after)  with focal deficits  Pt states she had headaches and went to the ED after kaity  Pt was d/c home and then presented back to the ED where was admitted and found to have right internal carotid artery dissection and R ischemic CVA (frontal lobe)  neurosurgery who recommended MRI brain amd MRA head and neck with no intervention recommended by neurosurgery and they followed up with patient  with repeat CTA head and neck  Seen by neuro sx with resolving issue  Pt states that PTA she was working FT as a , taking 7 classes online, caring for her 3 children (teenagers), driving and completing all ADLs/IADLs indep   P tcurrently states that she is on an adjusted schedule for school, taking 2 classes online and "struggling to keep up"  Pt is having extreme endurance issues and is only able to tolerate short distance ambulation and activity prior to getting fatigued  In addition pt reports mild strength deficits of L UE (hand) and visual intolerance  Pt reports being unable to focus, difficulty with placekeeping while reading, and impaired concentration  Pt has her oldest dtr home with her as well as her mother taking a leave of absence to assist with transportation  Pt states Dr Janeen Rivers Requesting pt not drive until "feeling stronger" and with improved visual performance          PMH:   Past Medical History:   Diagnosis Date    Asthma     ACTIVITY INDUCED ASTHMA    IBS (irritable bowel syndrome)        Pain Levels:     Restin    With Activity:  8 (headaches)    Objective    Impairment Observations:      Upper Extremity       IE LUE IE RUE Comments                 UPPER EXTREMITY FXN Impaired Intact Dominant Hand: R                         /Pinch Strength         Dynamometer      - Gross Grasp 30 lbs 50 lbs subnormal   Pinch Meter       - PINCER 9 lbs 10 lbs subnormal    - TRIPOD 11 lbs 12 lbs subnormal    - LATERAL 15 lbs  17 lbs subnormal             SENSATION      Myofilaments (3 61 Wnl) 3 61 3 61    Sharp Dull  Intact Intact    Proprioception Intact Intact    Hot/Cold Temp Intact Intact          COORDINATION      9 Hole Peg Test 20 07 seconds 16 63 seconds subnormal for age group   Fxnl Dexterity Test 22 58 seconds 19 86 seconds subnormal       Vision      R EYE     L EYE Comments                                        VISION SCREEN          NONE          Visual Acuity (near) 20/50 20/25    Binocularity (near) orthotropia and orthophoria orthotropia and orthophoria    Binocularity (far) orthotropia and orthophoria orthotropia and orthophoria    Convergence  No diplopia   Poor tolerance, +hard blinking, +discomfort   [employer]  Y and suppression of near   Y and suppression of near Accommodation Blurriness reported at >15 inches   Poor visual fixation, dec teaming   Pursuits smooth in all planes smooth in all planes Dec tolerance for pursuits in upper visual quadrants + dizziness, +discomfort   Saccades accurate in all planes accurate in all planes Poor tolerance for repetitious saccades   [facility]    Visual perceptual midline              Midline at at            [place]  at at            INTERVENTION COMMENTS:  Diagnosis: Dissection of carotid artery (HCC) [I77 71]  Precautions: NO OVERHEAD LIFTING, no lifting >10 lbs  FOTO: n/a  Insurance: Payor: Gregg Cardozo / Plan: Gregg Cardozo / Product Type: Medicaid HMO /   1 of 8 visits, PN due 2/19/21

## 2021-01-22 ENCOUNTER — OFFICE VISIT (OUTPATIENT)
Dept: PHYSICAL THERAPY | Facility: CLINIC | Age: 46
End: 2021-01-22
Payer: COMMERCIAL

## 2021-01-22 ENCOUNTER — OFFICE VISIT (OUTPATIENT)
Dept: OCCUPATIONAL THERAPY | Facility: CLINIC | Age: 46
End: 2021-01-22
Payer: COMMERCIAL

## 2021-01-22 DIAGNOSIS — I77.71 DISSECTION OF CAROTID ARTERY (HCC): Primary | ICD-10-CM

## 2021-01-22 PROCEDURE — 97530 THERAPEUTIC ACTIVITIES: CPT

## 2021-01-22 PROCEDURE — 97110 THERAPEUTIC EXERCISES: CPT | Performed by: PHYSICAL THERAPIST

## 2021-01-22 PROCEDURE — 97150 GROUP THERAPEUTIC PROCEDURES: CPT | Performed by: PHYSICAL THERAPIST

## 2021-01-22 PROCEDURE — 97112 NEUROMUSCULAR REEDUCATION: CPT | Performed by: PHYSICAL THERAPIST

## 2021-01-22 NOTE — PROGRESS NOTES
Daily Note     Today's date: 2021  Patient name: Akin Rueda  : 1975  MRN: [id number]  Referring provider: Levada Schirmer, MD  Dx:   Encounter Diagnosis     ICD-10-CM    1  Dissection of carotid artery (HCC)  I77 71                   Subjective: Felt very tired after last session      Objective: See treatment diary below      Assessment:Initiated some balance and neuromuscular coordination exercises this session as well as general strengthening  Kept exertion low as specific recommendations regarding exertion were not given until 4 weeks lyndsey  Plan: Continue per plan of care        Precautions: Carotid occlusion      Manuals                                                                 Neuro Re-Ed             Side stepping 3 laps mini squat            Step taps Foam 2 cones 30x            Tandem ht/hn 3 laps            Step ups 20x L LE only                                                   Ther Ex             STS 2x10            gastroc stretch 30 sec x 3            Mini squtas on faom 15x                                                                             Ther Activity                                       Gait Training                                       Modalities

## 2021-01-22 NOTE — PROGRESS NOTES
Occupational Therapy Daily Note:    Today's date: 2021  Patient name: Kaiden Newberry  : 1975  MRN: [id number]  Referring provider: Liberty Russell MD  Dx:   Encounter Diagnosis   Name Primary?  Dissection of carotid artery (HCC) Yes       Subjective: "The clutter really seems to bother my eyes"    Objective: Pt seen for OT treatment session focusing on /VM skills, HEP development and visual tolerance to inc engagement and participation in salient and IADL/IADL tasks  Pt engaged in multimatrix task to address multidirectional saccades, fxnl accommodation, and tolerance  Use of standard, visual closure, and figure ground cards positioned on slant board  Pt reports inc in eye strain with use of figure ground card  Letter cancellation worksheets (x2) positioned side by side on tabletop to encourage extreme L<>R horizontal saccades and visual search  Pt completed with use of placekeeping/occluder to inc tolerance within cluttered field  Pt alternating letter of cancellation between worksheets for inc challenge with visual attention  2 card and 9 card spot it to address tolerance and accuracy with multidirectional saccades and tolerance for accommodation  HEP provided with L<>R scanning, saccades, and visual placekeeping  Assessment: Tolerated treatment well  Pt reports greatest difficulties with visual search in cluttered environments with inc ocular strain and mild increase in HA  Pt began with HA 4/10 that inc slightly however pt able to tolerate well  Patient would benefit from continued skilled OT  Plan: Continued skilled OT per POC      INTERVENTION COMMENTS:  Diagnosis: Dissection of carotid artery (HCC) [I77 71]  Precautions: NO OVERHEAD LIFTING, no lifting >10 lbs  FOTO: n/a  Insurance: Payor: Rosie Primrose / Plan: Lawanda Primrose / Product Type: Medicaid HMO /   2 of 8 visits, PN due 21

## 2021-01-25 ENCOUNTER — TELEPHONE (OUTPATIENT)
Dept: NEUROLOGY | Facility: CLINIC | Age: 46
End: 2021-01-25

## 2021-01-25 NOTE — TELEPHONE ENCOUNTER
lvm for patient to call back for sooner appt - availability 1/25 @ 9a with Dr Abdi Branham - please schedule if available

## 2021-01-26 ENCOUNTER — APPOINTMENT (OUTPATIENT)
Dept: OCCUPATIONAL THERAPY | Facility: CLINIC | Age: 46
End: 2021-01-26
Payer: COMMERCIAL

## 2021-01-26 ENCOUNTER — APPOINTMENT (OUTPATIENT)
Dept: PHYSICAL THERAPY | Facility: CLINIC | Age: 46
End: 2021-01-26
Payer: COMMERCIAL

## 2021-01-27 ENCOUNTER — OFFICE VISIT (OUTPATIENT)
Dept: PHYSICAL THERAPY | Facility: CLINIC | Age: 46
End: 2021-01-27
Payer: COMMERCIAL

## 2021-01-27 ENCOUNTER — OFFICE VISIT (OUTPATIENT)
Dept: OCCUPATIONAL THERAPY | Facility: CLINIC | Age: 46
End: 2021-01-27
Payer: COMMERCIAL

## 2021-01-27 DIAGNOSIS — I77.71 DISSECTION OF CAROTID ARTERY (HCC): Primary | ICD-10-CM

## 2021-01-27 PROCEDURE — 97530 THERAPEUTIC ACTIVITIES: CPT

## 2021-01-27 PROCEDURE — 97112 NEUROMUSCULAR REEDUCATION: CPT | Performed by: PHYSICAL THERAPIST

## 2021-01-27 PROCEDURE — 97110 THERAPEUTIC EXERCISES: CPT

## 2021-01-27 PROCEDURE — 97112 NEUROMUSCULAR REEDUCATION: CPT

## 2021-01-27 PROCEDURE — 97150 GROUP THERAPEUTIC PROCEDURES: CPT | Performed by: PHYSICAL THERAPIST

## 2021-01-27 NOTE — PROGRESS NOTES
Daily Note     Today's date: 2021  Patient name: Shabbir Naidu  : 1975  MRN: [id number]  Referring provider: Sri Rendon MD  Dx:   Encounter Diagnosis   Name Primary?  Dissection of carotid artery (HCC) Yes                  Subjective: "This was harder than I thought and when my daughter checked them for me I missed quite a few"  Objective: See treatment diary below    Pt participated in skilled OT focusing on ocular motor control, hand strengthening and activity endurance improving fxl abilities for return to worker role and IADL's  WHEP for hand strengthening focusing on fmc/fms to improve fxl grasp/release and pinch patterns needed for small closure and container mngt engaging in ADL's/IADL's  Exercises  · Putty Squeezes - 10 reps - 3 sets - 5 sec hold - 1x daily - 5x weekly  · Finger Lumbricals with Putty - 10 reps - 3 sets - 1x daily - 7x weekly  · 3-Point Pinch with Putty - 10 reps - 3 sets - 1x daily - 7x weekly  · Key Pinch with Putty - 10 reps - 3 sets - 1x daily - 7x weekly  · Finger Key  with Putty - 10 reps - 3 sets - 1x daily - 7x weekly  · Thumb Opposition with Putty - 10 reps - 3 sets - 1x daily - 7x weekly  · Finger Adduction with Putty - 10 reps - 3 sets - 1x daily - 7x weekly  · Seated Finger Abduction with Putty - 10 reps - 3 sets - 1x daily - 7x weekly    Pt engaged in line tangles focusing on near saccadic movements in all planes  Assessment: Tolerated treatment well demo-G understanding of WHEP as evidenced by participation in exercises,  Pt completed line tanfgles demo accuracy of trace and fluidity of movement  Pt reported pain to left shoulder/arm pit unsure as to what it could be but stated she tried some lymph massage/light brushing w/o relief  ALTAMIRANO recommended calling Dr for assessment     Patient would benefit from continued OT    Plan: Continued skilled OT per POC     INTERVENTION COMMENTS:  Diagnosis: Dissection of carotid artery ([address] ) [I77 71]  Precautions: NO OVERHEAD LIFTING, no lifting >10 lbs  FOTO: n/a  Insurance: Payor: Marco Rummage / Plan: Marco Rummage / Product Type: Medicaid HMO /   3 QS 5 [id number], PN due 2/19/21

## 2021-01-29 ENCOUNTER — OFFICE VISIT (OUTPATIENT)
Dept: OCCUPATIONAL THERAPY | Facility: CLINIC | Age: 46
End: 2021-01-29
Payer: COMMERCIAL

## 2021-01-29 ENCOUNTER — OFFICE VISIT (OUTPATIENT)
Dept: PHYSICAL THERAPY | Facility: CLINIC | Age: 46
End: 2021-01-29
Payer: COMMERCIAL

## 2021-01-29 DIAGNOSIS — I77.71 DISSECTION OF CAROTID ARTERY (HCC): Primary | ICD-10-CM

## 2021-01-29 PROCEDURE — 97530 THERAPEUTIC ACTIVITIES: CPT

## 2021-01-29 PROCEDURE — 97112 NEUROMUSCULAR REEDUCATION: CPT | Performed by: PHYSICAL THERAPIST

## 2021-01-29 NOTE — PROGRESS NOTES
Daily Note     Today's date: 2021  Patient name: Rod Alston  : 1975  MRN: [id number]  Referring provider: Marylen Blamer, MD  Dx:   Encounter Diagnosis     ICD-10-CM    1  Dissection of carotid artery (HCC)  I77 71                   Subjective: Felt very tired after last session and has been sore in her shoulder  Objective: See treatment diary below      Assessment:Held session to 15 min shorter today  Pt reviewd home stretching and exercise program she has been completing with PT with no changes to note  Assessed shoulder with no significant findings to source of pain but did have palpation pain over costosternal joint  Plan: Continue per plan of care        Precautions: Carotid occlusion      Manuals                                                                Neuro Re-Ed             Side stepping 3 laps mini squat 3 laps min ue           Step taps Foam 2 cones 30x Foam cones 30x           Tandem ht/hn 3 laps 3 laps    On foam 3 laps no ue           Step ups 20x L LE only            bakcwards  Ht/hn 5 laps                                     Ther Ex             STS 2x10            gastroc stretch 30 sec x 3            Mini squtas on faom 15x                                                                             Ther Activity                                       Gait Training                                       Modalities

## 2021-01-29 NOTE — PROGRESS NOTES
Daily Note     Today's date: 2021  Patient name: Diogo Vega  : 1975  MRN: [id number]  Referring provider: Catherine Alvarado MD  Dx:   Encounter Diagnosis     ICD-10-CM    1  Dissection of carotid artery (HCC)  I77 71                   Subjective: Felt better after the last session, tired but not so painful      Objective: See treatment diary below      Assessment:Held session to 15 min shorter again today as she was fatigued at 45 min  Significant improvmeent in balance noted with ability to do rocker board with eyes closed and to hold balance in SLS with weighted ball toss  Overall greatest limitation continues to be endurance      Plan: Continue per plan of care        Precautions: Carotid occlusion      Manuals                                                               Neuro Re-Ed             Side stepping 3 laps mini squat 3 laps min ue 3 laps min ue          Step taps Foam 2 cones 30x Foam cones 30x 3 cones 30x           Tandem ht/hn 3 laps 3 laps    On foam 3 laps no ue 3 laps           Step ups 20x L LE only            bakcwards  Ht/hn 5 laps Ht/hn 40'x 1 ea          SLS   Ball toss 20x ea              Rocker board   40 taps EC                                                 Tandem stance   Foam bal toss - 30x red          Ther Ex             STS 2x10            gastroc stretch 30 sec x 3            Mini squtas on faom 15x            treadmill   6 minutes 1 8 mph                                                              Ther Activity                                       Gait Training                                       Modalities

## 2021-01-29 NOTE — PROGRESS NOTES
Occupational Therapy Daily Note:    Today's date: 2021  Patient name: Freddy Trujillo  : 1975  MRN: [id number]  Referring provider: Rosemarie Dangelo MD  Dx:   Encounter Diagnosis   Name Primary?  Dissection of carotid artery (HCC) Yes       Subjective: "Im feeling way better this week":    Objective: Pt seen for OT treatment session focusing on /VM skills and visual tolerance to inc engagement and participation in ADL/IADL fxn  Pt completed zigzag word search on tabletop to address multidirectional saccades and visual attention to task  Pt completed with reports of mild eye strain only, no increase in HA  Pt seated at tabletop engaged in embedded word search with letters positioned on post its on wall and word search on tabletop to inc accuracy with VM skills with near to far point accommodation  Upgraded task to complete in alphabetical order to inc cognitive demand and visual challenge  Pt engaged in letter grid copy with use of cluttered tiles, cluttered tiles positioned on card sorting board to inc visual distraction and use of busy environment  Pt completed grid copy and required to form multi directional words from grid  Assessment: Tolerated treatment well  Pt demo G improvement from previous OT tx sessions with no inc in dizziness or HA today, only inc in mild eye strain  Pt may benefit from trial of taping strategies next session  Patient would benefit from continued skilled OT  Plan: Continued skilled OT per POC        INTERVENTION COMMENTS:  Diagnosis: Dissection of carotid artery ([address] ) [I77 71]  Precautions: NO OVERHEAD LIFTING, no lifting >10 lbs  FOTO: n/a  Insurance: Payor: Wei Argueta / Plan: Wei Argueta / Product Type: Medicaid [facility] Kadeem Ayala  3 VD 6 YBMNRQ, PN due 21

## 2021-02-01 ENCOUNTER — APPOINTMENT (OUTPATIENT)
Dept: PHYSICAL THERAPY | Facility: CLINIC | Age: 46
End: 2021-02-01
Payer: COMMERCIAL

## 2021-02-03 ENCOUNTER — OFFICE VISIT (OUTPATIENT)
Dept: OCCUPATIONAL THERAPY | Facility: CLINIC | Age: 46
End: 2021-02-03
Payer: COMMERCIAL

## 2021-02-03 ENCOUNTER — OFFICE VISIT (OUTPATIENT)
Dept: PHYSICAL THERAPY | Facility: CLINIC | Age: 46
End: 2021-02-03
Payer: COMMERCIAL

## 2021-02-03 DIAGNOSIS — I77.71 DISSECTION OF CAROTID ARTERY (HCC): Primary | ICD-10-CM

## 2021-02-03 PROCEDURE — 97112 NEUROMUSCULAR REEDUCATION: CPT | Performed by: PHYSICAL THERAPIST

## 2021-02-03 PROCEDURE — 97110 THERAPEUTIC EXERCISES: CPT | Performed by: PHYSICAL THERAPIST

## 2021-02-03 PROCEDURE — 97112 NEUROMUSCULAR REEDUCATION: CPT

## 2021-02-03 PROCEDURE — 97530 THERAPEUTIC ACTIVITIES: CPT

## 2021-02-03 PROCEDURE — 97535 SELF CARE MNGMENT TRAINING: CPT

## 2021-02-03 NOTE — PROGRESS NOTES
Daily Note     Today's date: 2/3/2021  Patient name: Rik Ortega  : 1975  MRN: [id number]  Referring provider: Carroll Becerra MD  Dx:   Encounter Diagnosis     ICD-10-CM    1  Dissection of carotid artery (HCC)  I77 71                   Subjective:       Objective: See treatment diary below      Assessment:Attempted bike this session but seh began having pain in her neck and so discontinued after 4 minutes  Pt also was very tired after mini squats on bosu ball and had more difficulty for hte rest of hte session then  Plan: Continue per plan of care        Precautions: Carotid occlusion      Manuals  2/3                                                             Neuro Re-Ed             Side stepping 3 laps mini squat 3 laps min ue 3 laps min ue          Step taps Foam 2 cones 30x Foam cones 30x 3 cones 30x  Foam beams to cones 3 laps         Tandem ht/hn 3 laps 3 laps    On foam 3 laps no ue 3 laps  Foam beams 3 laps         Step ups 20x L LE only            bakcwards  Ht/hn 5 laps Ht/hn 40'x 1 ea Ht/hn 40'x 2 ea         SLS   Ball toss 20x ea              Rocker board   40 taps EC          hurdles    12" 3 laps with foam alternating         bosu ball     mini squats - 25x                       Tandem stance   Foam bal toss - 30x red          Ther Ex             STS 2x10            gastroc stretch 30 sec x 3            Mini squtas on faom 15x            treadmill   6 minutes 1 8 mph                                                              Ther Activity                                       Gait Training                                       Modalities

## 2021-02-03 NOTE — PROGRESS NOTES
Daily Note     Today's date: 2/3/2021  Patient name: Amber Iglesias  : 1975  MRN: [id number]  Referring provider: Tanesha Cobian MD  Dx:   Encounter Diagnosis   Name Primary?  Dissection of carotid artery (HCC) Yes                  Subjective: "I notice improvement but my eyes are tired"  Objective: See treatment diary below    Pt participated in skilled OT focusing on occularmotor skills, visual tolerance w/head turns to improve functional skills for ADL/IADLs and home/work roles  Pt began treatment by engaging in shape copy using a template and grid paper to address multidirectional saccades and dynamic head turns w/activity placed on mirror board to right of pt  Pt engaged in taping strategies focusing on R->L eye strengthening w/monocular vision followed by peripheral field taping focusing on sustained convergence 3x's 3 min, while engaging in shape compare/contrast worksheet to identify specific shape in clutter  Pt utilized visual cues to hold place on worksheet  Pt engaged in I spy activity to address visual clutter, acuity, and multidirectional saccades  Pt was given a card with items to search for on 4 boards positioned in various planes  Pt located 1 item from each card  Pt was able to complete 5 cards successfully  Assessment: Tolerated treatment well reporting eye fatigue post session    Patient would benefit from continued OT    Plan: Continued skilled OT per POC     INTERVENTION COMMENTS:  Diagnosis: Dissection of carotid artery ([facility] [address] ) [I77 71]  Precautions: NO OVERHEAD LIFTING, no lifting >10 lbs  FOTO: n/a  Insurance: Payor: Angela Hubbard / Plan: Angela Hubbard / Product Type: Medicaid HMO /   5 KUSHAL KIRKPATRICK due 21

## 2021-02-04 ENCOUNTER — TELEPHONE (OUTPATIENT)
Dept: NEUROLOGY | Facility: CLINIC | Age: 46
End: 2021-02-04

## 2021-02-05 ENCOUNTER — APPOINTMENT (OUTPATIENT)
Dept: PHYSICAL THERAPY | Facility: CLINIC | Age: 46
End: 2021-02-05
Payer: COMMERCIAL

## 2021-02-05 ENCOUNTER — APPOINTMENT (OUTPATIENT)
Dept: OCCUPATIONAL THERAPY | Facility: CLINIC | Age: 46
End: 2021-02-05
Payer: COMMERCIAL

## 2021-02-06 ENCOUNTER — OFFICE VISIT (OUTPATIENT)
Dept: PHYSICAL THERAPY | Facility: CLINIC | Age: 46
End: 2021-02-06
Payer: COMMERCIAL

## 2021-02-06 ENCOUNTER — OFFICE VISIT (OUTPATIENT)
Dept: OCCUPATIONAL THERAPY | Facility: CLINIC | Age: 46
End: 2021-02-06
Payer: COMMERCIAL

## 2021-02-06 DIAGNOSIS — I77.71 DISSECTION OF CAROTID ARTERY (HCC): Primary | ICD-10-CM

## 2021-02-06 PROCEDURE — 97150 GROUP THERAPEUTIC PROCEDURES: CPT

## 2021-02-06 PROCEDURE — 97110 THERAPEUTIC EXERCISES: CPT | Performed by: PHYSICAL THERAPIST

## 2021-02-06 PROCEDURE — 97112 NEUROMUSCULAR REEDUCATION: CPT | Performed by: PHYSICAL THERAPIST

## 2021-02-06 PROCEDURE — 97112 NEUROMUSCULAR REEDUCATION: CPT

## 2021-02-06 PROCEDURE — 97530 THERAPEUTIC ACTIVITIES: CPT

## 2021-02-06 NOTE — PROGRESS NOTES
Occupational Therapy Daily Note:    Today's date: 2021  Patient name: La Roberts  : 1975  MRN: [id number]  Referring provider: Gladys Wilkinson MD  Dx:   Encounter Diagnosis   Name Primary?  Dissection of carotid artery (HCC) Yes     TIME: 900-915  - unsup   915-930 group   930-1000 one on one              Subjective: "the shapes on this paper look like they're are dancing"    Objective: Pt seen for OT treatment session focusing on occular motor skills, visual attention, visual perceptual and dynamic head turns to improve independence with ADL/IADLs  Pt completed outline shapes worksheet with eye taping strategies to address visual attention and R to L eye strengthening with monocular vision occluded and peripheral field taping for 3 min each  Pt outlined specific shapes while counting areas identified  Noted eye watering with monocular taping strategy demonstrating decreased tolerance and endurance for occular motor skills  Pt engaged in iTRax activity to address dynamic visual attention, and multidirectional saccades  OT positioned iTrax mazes on slant board oriented to R and L sides pt required to copy block to block designs on tabletop  Pt tolerated activity well and completed 5 boards in 15 min  Pt engaged in [address] word finding & visual scanning  activity to address screen tolerance, multidirectional saccades, and divided attention  Pt required to search for word provided verbally, while alternating with central fixation  Pt tolerated screen time for 30 min  Assessment: Tolerated treatment well  Pt demonstrated good head turns and multidirectional saccades  Pt required rest breaks during eye taping strategies for increased tolerance  Pt should continue to work on eye taping for improved occular motorskills and tolerance  Patient would benefit from continued skilled OT  Plan: Continued skilled OT per POC      INTERVENTION COMMENTS:  Diagnosis: Dissection of carotid artery ([address] ) [I77 71]  Precautions: NO OVERHEAD LIFTING, no lifting >10 lbs  FOTO: n/a  Insurance: Payor: [facility] [employer] / Plan: [employer] / Product Type: Medicaid HMO /   6 GARRICK 1 NVUSQF, PN due 2/19/21

## 2021-02-06 NOTE — PROGRESS NOTES
Daily Note     Today's date: 2021  Patient name: Vanessa Dumas  : 1975  MRN: [id number]  Referring provider: Ivan Carvajal MD  Dx:   Encounter Diagnosis     ICD-10-CM    1  Dissection of carotid artery (HCC)  I77 71                   Subjective: Patient reports walking for a longer time yesterday  Objective: See treatment diary below      Assessment:used VR today for balance and vestibular coordination which pt did well with but did not feel well afterwards  Needed long seated rest break afterwards  Plan next to start doing aerobic       Plan: Continue per plan of care        Precautions: Carotid occlusion      Manuals 1/22 1/27 1/29 2/3 2/6                                                            Neuro Re-Ed             Side stepping 3 laps mini squat 3 laps min ue 3 laps min ue  3 laps min ue        Step taps Foam 2 cones 30x Foam cones 30x 3 cones 30x  Foam beams to cones 3 laps Foam beams 3 laps        Tandem ht/hn 3 laps 3 laps    On foam 3 laps no ue 3 laps  Foam beams 3 laps Side stepping 3 laps        Step ups 20x L LE only            bakcwards  Ht/hn 5 laps Ht/hn 40'x 1 ea Ht/hn 40'x 2 ea EC 40' x 4        SLS   Ball toss 20x ea              Rocker board   40 taps EC          hurdles    12" 3 laps with foam alternating         bosu ball     mini squats - 25x          VR     Head eye coordination - 3 min    balloons - 3 min    VOR - 3 min        Tandem stance   Foam bal toss - 30x red          Ther Ex             STS 2x10            gastroc stretch 30 sec x 3            Mini squtas on faom 15x            treadmill   6 minutes 1 8 mph                                                              Ther Activity                                       Gait Training                                       Modalities

## 2021-02-08 ENCOUNTER — APPOINTMENT (OUTPATIENT)
Dept: PHYSICAL THERAPY | Facility: CLINIC | Age: 46
End: 2021-02-08
Payer: COMMERCIAL

## 2021-02-09 ENCOUNTER — OFFICE VISIT (OUTPATIENT)
Dept: OCCUPATIONAL THERAPY | Facility: CLINIC | Age: 46
End: 2021-02-09
Payer: COMMERCIAL

## 2021-02-09 ENCOUNTER — OFFICE VISIT (OUTPATIENT)
Dept: PHYSICAL THERAPY | Facility: CLINIC | Age: 46
End: 2021-02-09
Payer: COMMERCIAL

## 2021-02-09 DIAGNOSIS — I77.71 DISSECTION OF CAROTID ARTERY (HCC): Primary | ICD-10-CM

## 2021-02-09 PROCEDURE — 97110 THERAPEUTIC EXERCISES: CPT | Performed by: PHYSICAL THERAPIST

## 2021-02-09 PROCEDURE — 97112 NEUROMUSCULAR REEDUCATION: CPT

## 2021-02-09 PROCEDURE — 97112 NEUROMUSCULAR REEDUCATION: CPT | Performed by: PHYSICAL THERAPIST

## 2021-02-09 PROCEDURE — 97530 THERAPEUTIC ACTIVITIES: CPT

## 2021-02-09 PROCEDURE — 97535 SELF CARE MNGMENT TRAINING: CPT

## 2021-02-09 NOTE — PROGRESS NOTES
Daily Note     Today's date: 2021  Patient name: Lesa Weeks  : 1975  MRN: [id number]  Referring provider: Karoline Boucher MD  Dx:   Encounter Diagnosis   Name Primary?  Dissection of carotid artery (HCC) Yes                  Subjective: "At least I can do the vision and not get a HA  That's a plus"  Objective: See treatment diary below    Pt participated in skilled OT focusing on ocularmotor re-ed and sustained/alternating attention improving visual tolerance for screen time increasing engagement in screen time for success in student role  Session initiated with taping strategy focusing on L->R eye strengthening engaging in monocular vision for 5 min followed by peripheral field taping to address sustained convergence for 5 min while engaging in compare/contrast worksheets grading up activity level of complexity of patterns followed by sequencing pattern worksheet  Assessment: Tolerated treatment well reporteng eye fatigue with taping strategies, however, pt stated she is not getting HA's like she use to with visual stimulation  Pt completed worksheets accurately with min difficulty     Patient would benefit from continued OT      Plan: Continued skilled OT per POC     INTERVENTION COMMENTS:  Diagnosis: Dissection of carotid artery ([address] ) [I77 71]  Precautions: NO OVERHEAD LIFTING, no lifting >10 lbs  FOTO: n/a  Insurance: Payor: Beverley Louder / Plan: Beverley Louder / Product Type: Medicaid [facility] Rockie Fields  8 HW 2 MTUGPX, PN due 21

## 2021-02-09 NOTE — PROGRESS NOTES
Daily Note     Today's date: 2021  Patient name: Reji Carbajal  : 1975  MRN: [id number]  Referring provider: Renée Ennis MD  Dx:   Encounter Diagnosis     ICD-10-CM    1  Dissection of carotid artery (HCC)  I77 71                   Subjective: Patient reports feeling so tired she needed a nap after last session but then was recovered enough afterwards she could go out shopping      Objective: See treatment diary below      Assessment:Initiated regimented aerobic program to begin to work towards increased aerobic tolerance on treadmill  She tolerated well  Plan next session to give her specific calendar at next session in order to work towards regular aerobic program at home  Attempted doing cog with balance today which pt was unale to do  Plan: Continue per plan of care        Precautions: Carotid occlusion      Manuals 1/22 1/27 1/29 2/3 2/6                                                            Neuro Re-Ed             Side stepping 3 laps mini squat 3 laps min ue 3 laps min ue  3 laps min ue Foam 3 laps ht/hn       Step taps Foam 2 cones 30x Foam cones 30x 3 cones 30x  Foam beams to cones 3 laps Foam beams 3 laps        Tandem ht/hn 3 laps 3 laps    On foam 3 laps no ue 3 laps  Foam beams 3 laps Side stepping 3 laps hallway 40'x 2       Step ups 20x L LE only            bakcwards  Ht/hn 5 laps Ht/hn 40'x 1 ea Ht/hn 40'x 2 ea EC 40' x 4 EO ht/hn 40'x 2       SLS   Ball toss 20x ea       Foam ball toss - 5 repetitions with and iwthout cog       Rocker board   40 taps EC          hurdles    12" 3 laps with foam alternating         bosu ball     mini squats - 25x          VR     Head eye coordination - 3 min    balloons - 3 min    VOR - 3 min        Tandem stance   Foam bal toss - 30x red          Ther Ex             STS 2x10            gastroc stretch 30 sec x 3            Mini squtas on faom 15x            treadmill   6 minutes 1 8 mph   5 min warm up - RPE 2    4 min RPE 5    2 min recovery    4 min RPE 5    3 min cooldown RPE 2                                                           Ther Activity                                       Gait Training                                       Modalities

## 2021-02-10 ENCOUNTER — OFFICE VISIT (OUTPATIENT)
Dept: NEUROLOGY | Facility: CLINIC | Age: 46
End: 2021-02-10
Payer: COMMERCIAL

## 2021-02-10 VITALS
HEART RATE: 77 BPM | WEIGHT: 165 LBS | SYSTOLIC BLOOD PRESSURE: 104 MMHG | DIASTOLIC BLOOD PRESSURE: 68 MMHG | BODY MASS INDEX: 27.49 KG/M2 | HEIGHT: 65 IN

## 2021-02-10 DIAGNOSIS — G43.909 MIGRAINE, UNSPECIFIED, NOT INTRACTABLE, WITHOUT STATUS MIGRAINOSUS: ICD-10-CM

## 2021-02-10 DIAGNOSIS — I63.9 ISCHEMIC STROKE (HCC): ICD-10-CM

## 2021-02-10 DIAGNOSIS — I77.71 DISSECTION OF CAROTID ARTERY (HCC): ICD-10-CM

## 2021-02-10 PROCEDURE — 99215 OFFICE O/P EST HI 40 MIN: CPT | Performed by: PSYCHIATRY & NEUROLOGY

## 2021-02-10 RX ORDER — MULTIVITAMIN WITH IRON
2 TABLET ORAL DAILY
COMMUNITY

## 2021-02-10 RX ORDER — ONDANSETRON 4 MG/1
TABLET, FILM COATED ORAL EVERY 6 HOURS PRN
COMMUNITY
Start: 2020-12-24 | End: 2021-03-01

## 2021-02-10 RX ORDER — LANOLIN ALCOHOL/MO/W.PET/CERES
1000 CREAM (GRAM) TOPICAL DAILY
COMMUNITY
End: 2021-03-01

## 2021-02-10 RX ORDER — LEVALBUTEROL TARTRATE 45 UG/1
2 AEROSOL, METERED ORAL DAILY PRN
COMMUNITY
End: 2021-03-01

## 2021-02-10 NOTE — PROGRESS NOTES
Debbie Horowitz is a 41 y o  female  Chief Complaint   Patient presents with    Ischemic Stroke w carotid Artery Dissection       Assessment:  1  Ischemic stroke ([address] )    2  Migraine, unspecified, not intractable, without status migrainosus    3  Dissection of carotid artery [facility])        Plan:   continue follow-up with her neurologist at [facility]    continue follow-up with neurovascular surgeon at [facility]  patient already on Xarelto 15 mg a day and aspirin 81 mg a day and Lipitor  stroke education given to the patient  avoid strenuous activities  keep blood pressure cholesterol and sugar under control      Follow-up on a p r n  basis    Discussion:   patient's recent hospital records and MRI scan of the brain and CTA head and neck results were reviewed, she has recently been seen by neurologist at [facility] and also at [place] for a 2nd opinion, she is on Xarelto 15 mg a day at least for 3 months and has a repeat study for March and is going to follow up with her neurovascular surgeon, she is going to continue aspirin at least for 6 months to year depending on the results of the repeat imaging study, and will continue with statin for a similar amount of time, stroke education given to the patient, I have advised her to avoid chiropractic manipulation, she has follow-up appointments with her neurologist at [facility] and with the neurovascular surgeon at [facility], I explained to her that she does not need to follow up with me since she is already following with a neurologist and a neurosurgeon, she is going to avoid lifting heavy weights to keep a blood pressure cholesterol and sugar under control to go to the hospital if has any worsening symptoms or recurrence of the symptoms and see me back on as-needed basis and follow up with other physicians, for technical reasons the chart could not be signed on the same day    Subjective:    HPI     43-year-old female who was recently admitted to [facility] in December with headache after chiropractor manipulation, she had a CTA that showed a right ICA dissection and was started on heparin drip and then was transition to Xarelto and aspirin, she also had some abnormal vision in the left eye felt like she was looking through a scope, MRI scan of the brain showed her right frontal focus of cortical acute to subacute ischemia and an MRA showed right cervical ICA dissection and occlusion beginning at C2 level extending sup, she had a CT on 12/26/2020 that showed a high-grade stenosis terminating and occlusion at the C1 level of the right cervical ICA suggesting dissection, there is decreased flow distally in the right cervical and right cavernous ICA possibly from retrograde flow, she is on aspirin 81 mg daily Xarelto 15 mg daily and Lipitor 40 mg daily, she denies having any headaches at this time, she denies any vision difficulties she had some weakness of the left side that has improved with physical therapy, no speech difficulty no bowel and bladder incontinence no confusion, she recently saw Dr Thalia Roth neurovascular surgeon and has the repeat CTA scheduled for March and a follow-up appointment with him, no falls, no other complaints      Vitals:    02/10/21 1112   BP: 104/68   BP Location: Left arm   Patient Position: Sitting   Cuff Size: Adult   Pulse: 77   Weight: 74 8 kg (165 lb)   Height: 5' 4 5" (1 638 m)       Current Medications    Current Outpatient Medications:     aspirin (ECOTRIN LOW STRENGTH) 81 mg EC tablet, Take 1 tablet (81 mg total) by mouth daily, Disp: 30 tablet, Rfl: 0    atorvastatin (LIPITOR) 40 mg tablet, Take 1 tablet (40 mg total) by mouth every evening, Disp: 30 tablet, Rfl: 0    Cholecalciferol 50 MCG (2000 UT) CAPS, Take 1 capsule by mouth daily, Disp: , Rfl:     gabapentin (NEURONTIN) 100 mg capsule, Take 1 capsule (100 mg total) by mouth 3 (three) times a day as needed (headache), Disp: 30 capsule, Rfl: 0    levalbuterol (XOPENEX HFA) 45 mcg/act inhaler, Inhale 2 puffs daily as needed, Disp: , Rfl:     Lysine 500 MG CAPS, Take by mouth daily, Disp: , Rfl:     Magnesium 400 MG CAPS, Take 2 capsules by mouth daily, Disp: , Rfl:     NON FORMULARY, Floradix Iron supplement, Disp: , Rfl:     ondansetron (ZOFRAN) 4 mg tablet, every 6 (six) hours as needed, Disp: , Rfl:     rivaroxaban (XARELTO) 15 mg tablet, Take 1 tablet (15 mg total) by mouth daily with breakfast for 21 days, Disp: 21 tablet, Rfl: 0    UNKNOWN TO PATIENT, , Disp: , Rfl:     vitamin B-12 (VITAMIN B-12) 1,000 mcg tablet, Take 1,000 mcg by mouth daily, Disp: , Rfl:     b complex vitamins tablet, Take 1 tablet by mouth daily, Disp: , Rfl:     CHLOROPHYLL PO, Take 1 Dose by mouth daily, Disp: , Rfl:     cholecalciferol (VITAMIN D3) 1,000 units tablet, Take 1,000 Units by mouth daily, Disp: , Rfl:     LYSINE PO, Take 1 tablet by mouth daily, Disp: , Rfl:     multivitamin (THERAGRAN) TABS, Take 1 tablet by mouth daily, Disp: , Rfl:     Spirulina 500 MG TABS, Take 1 tablet by mouth daily, Disp: , Rfl:     tiZANidine (ZANAFLEX) 2 mg tablet, Take 1 tablet (2 mg total) by mouth every 8 (eight) hours as needed for muscle spasms (Patient not taking: Reported on 1/18/2021), Disp: 30 tablet, Rfl: 0    UNKNOWN TO PATIENT, , Disp: , Rfl:     UNKNOWN TO PATIENT, , Disp: , Rfl:     UNKNOWN TO PATIENT, , Disp: , Rfl:       Allergies  Levofloxacin, Metronidazole, Nuts, Sulfa antibiotics, and Latex    Past Medical History  Past Medical History:   Diagnosis Date    Asthma     ACTIVITY INDUCED ASTHMA    IBS (irritable bowel syndrome)          Past Surgical History:  Past Surgical History:   Procedure Laterality Date    ABDOMINAL SURGERY      LAPROSCOPY FOR ENDOMETRIOSIS    CERVICAL BIOPSY  W/ LOOP ELECTRODE EXCISION      FINGER SURGERY      LEFT MIDDLE FINGER SURGERY 5 YRS AGO    TUBAL LIGATION           Family History:  Family History Problem Relation Age of Onset    Rheum arthritis Mother     Diverticulitis Father    Francis Albarado Gout Brother     Alzheimer's disease Maternal Grandmother     Transient ischemic attack Maternal Grandmother     Aneurysm Maternal Grandfather     Hypertension Paternal Grandmother     Dementia Paternal Grandmother     COPD Paternal Grandfather     Hypertension Paternal Grandfather     Emphysema Paternal Grandfather        Social History:   reports that she quit smoking about 21 years ago  She has never used smokeless tobacco  She reports previous alcohol use  She reports that she does not use drugs  I have reviewed the past medical history, surgical history, social and family history, current medications, allergies vitals, review of systems, and updated this information as appropriate today  Objective:    Physical Exam    Neurological Exam    GENERAL:  Cooperative in no acute distress  Well-developed and well-nourished    HEAD and NECK   Head is atraumatic normocephalic with no lesions or masses  Neck is supple with full range of motion    CARDIOVASCULAR  Carotid Arteries-no carotid bruits  NEUROLOGIC:  Mental Status-the patient is awake alert and oriented without aphasia or apraxia  Cranial Nerves: Visual fields are full to confrontation  Extraocular movements are full without nystagmus  Pupils are 2-1/2 mm and reactive  Face is symmetrical to light touch  Movements of facial expression move symmetrically  Hearing is normal to finger rub bilaterally  Soft palate lifts symmetrically  Shoulder shrug is symmetrical  Tongue is midline without atrophy  Motor: No drift is noted on arm extension  Strength is full in the upper and lower extremities with normal bulk and tone  Sensory: Intact to temperature and vibratory sensation in the upper and lower extremities bilaterally  Cortical function is intact  Coordination: Finger to nose testing is performed accurately  Romberg is negative   Gait reveals a normal base with symmetrical arm swing  Tandem walk is normal   Reflexes:     2+ and symmetrical   Toes are downgoing          ROS:  Review of Systems   Constitutional: Positive for fatigue  Negative for appetite change and fever  HENT: Positive for tinnitus  Negative for drooling, ear pain, trouble swallowing and voice change  Eyes: Negative for photophobia, pain and visual disturbance  Right eye twitching    Respiratory: Negative for chest tightness and shortness of breath  Cardiovascular: Positive for palpitations  Negative for chest pain and leg swelling  Gastrointestinal: Positive for abdominal pain, constipation, diarrhea and nausea  Negative for vomiting  Endocrine: Negative for cold intolerance and heat intolerance  Genitourinary: Negative for difficulty urinating, frequency and urgency  Musculoskeletal: Positive for neck pain and neck stiffness  Negative for back pain, gait problem, joint swelling and myalgias  Skin: Negative for rash  Neurological: Negative for dizziness, tremors, seizures, syncope, facial asymmetry, speech difficulty, weakness, light-headedness, numbness and headaches  Psychiatric/Behavioral: Positive for decreased concentration  Negative for agitation, behavioral problems, confusion, dysphoric mood and sleep disturbance  The patient is nervous/anxious  The patient is not hyperactive

## 2021-02-12 ENCOUNTER — OFFICE VISIT (OUTPATIENT)
Dept: OCCUPATIONAL THERAPY | Facility: CLINIC | Age: 46
End: 2021-02-12
Payer: COMMERCIAL

## 2021-02-12 ENCOUNTER — OFFICE VISIT (OUTPATIENT)
Dept: PHYSICAL THERAPY | Facility: CLINIC | Age: 46
End: 2021-02-12
Payer: COMMERCIAL

## 2021-02-12 DIAGNOSIS — I77.71 DISSECTION OF CAROTID ARTERY (HCC): Primary | ICD-10-CM

## 2021-02-12 PROCEDURE — 97530 THERAPEUTIC ACTIVITIES: CPT

## 2021-02-12 PROCEDURE — 97150 GROUP THERAPEUTIC PROCEDURES: CPT | Performed by: PHYSICAL THERAPIST

## 2021-02-12 PROCEDURE — 97112 NEUROMUSCULAR REEDUCATION: CPT

## 2021-02-12 PROCEDURE — 97112 NEUROMUSCULAR REEDUCATION: CPT | Performed by: PHYSICAL THERAPIST

## 2021-02-12 NOTE — PROGRESS NOTES
Daily Note     Today's date: 2021  Patient name: Henrietta Chavira  : 1975  MRN: [id number]  Referring provider: Andrew Davey MD  Dx:   Encounter Diagnosis     ICD-10-CM    1  Dissection of carotid artery (HCC)  I77 71                   Subjective: Patient reported feeling well today, was tired after session last time      Objective: See treatment diary below      Assessment: Assigned light exerise program to build tolerance to activity which pt showed good understanding of  She was able to complete balance exercises with minimal difficulty today  Plan: Continue per plan of care        Precautions: Carotid occlusion      Manuals 1/22 1/27 1/29 2/3 2/6   2/12                                                         Neuro Re-Ed             Side stepping 3 laps mini squat 3 laps min ue 3 laps min ue  3 laps min ue Foam 3 laps ht/hn  3 laps ht/hn     Step taps Foam 2 cones 30x Foam cones 30x 3 cones 30x  Foam beams to cones 3 laps Foam beams 3 laps   Foam beams 3 laps     Tandem ht/hn 3 laps 3 laps    On foam 3 laps no ue 3 laps  Foam beams 3 laps Side stepping 3 laps hallway 40'x 2  Ht/hn 40'x 2     Step ups 20x L LE only            bakcwards  Ht/hn 5 laps Ht/hn 40'x 1 ea Ht/hn 40'x 2 ea EC 40' x 4 EO ht/hn 40'x 2  Ht/hn tandem 40'x 2     SLS   Ball toss 20x ea       Foam ball toss - 5 repetitions with and iwthout cog       Rocker board   40 taps EC          hurdles    12" 3 laps with foam alternating         bosu ball     mini squats - 25x          VR     Head eye coordination - 3 min    balloons - 3 min    VOR - 3 min        Tandem stance   Foam bal toss - 30x red     EC 30'x 2     Ther Ex             STS 2x10            gastroc stretch 30 sec x 3            Mini squtas on faom 15x            treadmill   6 minutes 1 8 mph   5 min warm up - RPE 2    4 min RPE 5    2 min recovery    4 min RPE 5    3 min cooldown RPE 2  5 min warm up - RPE 2    4 min RPE 5    2 min recovery    4 min RPE 5    3 min cooldown RPE 2                                                         Ther Activity                                       Gait Training                                       Modalities

## 2021-02-12 NOTE — PROGRESS NOTES
Occupational Therapy Daily Note:    Today's date: 2021  Patient name: Yu Prieto  : 1975  MRN: [id number]  Referring provider: Sonal Bruner MD  Dx:   Encounter Diagnosis   Name Primary?  Dissection of carotid artery ([facility] [address] ) Yes                  Pt was treated by Adalberto Sandifer, OTS and treatment was supervised and documentation was reviewed by Deborah Hyatt MS, OTR/L    Subjective: "my left eye was more blurry than the right"    Objective: Pt seen for OT treatment session focusing on convergence/divergence, visual tolerance of multi-modal environment, and oculomotor control for increased skills with work and school roles  Pt engaged in eye taping strategies for R-> L eye strengthening and convergence with monocular vision and peripheral field taping for 5 min each  Pt completed where's bro search and count the items worksheet placed on slant board for improved tolerance with cluttered environment, visual search, and sustained convergence  Pt reported more difficulty with L eye occluded and tolerated with min increase in eye fatigue  Pt completed word search with directional arrows to address /VM accuracy and tolerance for increased clutter and distractions in multimodal environment  Pt provided with 2 words verbally and worksheet placed on slant board  Pt located 100% of words given and activity upgraded to 3 words at a time and required verbal cues for 25%  Pt engaged in spot it activity with cards in hand and matching to cards at floor level to address near to far point accommodation  Pt reported mild neck pain and activity accommodated to seated position with cards positioned on vertical surface to promote near to far point accommodation  Pt tolerated activity and was given "word around" cards intermittently to add alternating visual attention component  Assessment: Tolerated treatment well   Pt reported min increase of eye fatigue/HA with convergence and monocular vision taping strategies  Pt demonstrated difficulty with multimodal environment and should continue to address tolerance of cluttered environment  Patient would benefit from continued skilled OT  Plan: Continued skilled OT per POC      INTERVENTION COMMENTS:  Diagnosis: Dissection of carotid artery ([facility] [address] ) [I77 71]  Precautions: NO OVERHEAD LIFTING, no lifting >10 lbs  FOTO: n/a  Insurance: Payor: [employer]катерина Royalty / Plan: [place] Royalty / Product Type: Medicaid [facility] Ruthy Daly  4 KH 4 KUSHAL ROBERTO due 2/19/21

## 2021-02-15 ENCOUNTER — APPOINTMENT (OUTPATIENT)
Dept: PHYSICAL THERAPY | Facility: CLINIC | Age: 46
End: 2021-02-15
Payer: COMMERCIAL

## 2021-02-17 ENCOUNTER — OFFICE VISIT (OUTPATIENT)
Dept: PHYSICAL THERAPY | Facility: CLINIC | Age: 46
End: 2021-02-17
Payer: COMMERCIAL

## 2021-02-17 ENCOUNTER — OFFICE VISIT (OUTPATIENT)
Dept: OCCUPATIONAL THERAPY | Facility: CLINIC | Age: 46
End: 2021-02-17
Payer: COMMERCIAL

## 2021-02-17 DIAGNOSIS — I77.71 DISSECTION OF CAROTID ARTERY (HCC): Primary | ICD-10-CM

## 2021-02-17 DIAGNOSIS — I63.9 ISCHEMIC STROKE (HCC): ICD-10-CM

## 2021-02-17 PROCEDURE — 97112 NEUROMUSCULAR REEDUCATION: CPT

## 2021-02-17 PROCEDURE — 97112 NEUROMUSCULAR REEDUCATION: CPT | Performed by: PHYSICAL THERAPIST

## 2021-02-17 PROCEDURE — 97530 THERAPEUTIC ACTIVITIES: CPT

## 2021-02-17 PROCEDURE — 97116 GAIT TRAINING THERAPY: CPT | Performed by: PHYSICAL THERAPIST

## 2021-02-17 NOTE — PROGRESS NOTES
Daily Note     Today's date: 2021  Patient name: Lucy Jacobo  : 1975  MRN: [id number]  Referring provider: Marii Cohen MD  Dx:   Encounter Diagnosis     ICD-10-CM    1  Dissection of carotid artery (HCC)  I77 71                   Subjective: woke up this morning feeling almost like herself gain  Feeling good today  Went to eye doctor on Monday, per patient no blood clots present and visual deficits should heal on their own  Is going to attempt driving next week  Objective: See treatment diary below      Assessment: reports of getting hot and tired with performance of yoga poses requiring rest breaks between  Minimal increase in heart rate  Challenged with EC on foam tandem standing  Educated patient on neuroplasticity and adapation  Plan: Continue per plan of care        Precautions: Carotid occlusion      Manuals 1/22 1/27 1/29 2/3 2/6   2/12  2/17                                                       Neuro Re-Ed             Side stepping 3 laps mini squat 3 laps min ue 3 laps min ue  3 laps min ue Foam 3 laps ht/hn  3 laps ht/hn     Step taps Foam 2 cones 30x Foam cones 30x 3 cones 30x  Foam beams to cones 3 laps Foam beams 3 laps   Foam beams 3 laps     Tandem ht/hn 3 laps 3 laps    On foam 3 laps no ue 3 laps  Foam beams 3 laps Side stepping 3 laps hallway 40'x 2  Ht/hn 40'x 2     Step ups 20x L LE only            bakcwards  Ht/hn 5 laps Ht/hn 40'x 1 ea Ht/hn 40'x 2 ea EC 40' x 4 EO ht/hn 40'x 2  Ht/hn tandem 40'x 2  HT/HN  // bars  3 laps    SLS   Ball toss 20x ea       Foam ball toss - 5 repetitions with and iwthout cog       Rocker board   40 taps EC          hurdles    12" 3 laps with foam alternating         bosu ball     mini squats - 25x          VR     Head eye coordination - 3 min    balloons - 3 min    VOR - 3 min        Tandem stance   Foam bal toss - 30x red     EC 30'x 2     Static balance          Yoga poses  Tree pose  R sls 45"  L SLS 39"    Mod chair pose   L 32"  R: 23"    HR 92 BPM   Ther Ex             STS 2x10            gastroc stretch 30 sec x 3            Mini squtas on faom 15x            treadmill   6 minutes 1 8 mph   5 min warm up - RPE 2    4 min RPE 5    2 min recovery    4 min RPE 5    3 min cooldown RPE 2  5 min warm up - RPE 2    4 min RPE 5    2 min recovery    4 min RPE 5    3 min cooldown RPE 2  5 min warm up 1 3-1  4mph    1 7mph x 3 min     2 min recovery 1 3 mph     3 min 1  7mph                                                            Ther Activity                                       Gait Training                                       Modalities

## 2021-02-17 NOTE — PROGRESS NOTES
Occupational Therapy Daily Note:    Today's date: 2021  Patient name: Silver Breaux  : 1975  MRN: [id number]  Referring provider: Shireen Denton MD  Dx:   Encounter Diagnoses   Name Primary?  Dissection of carotid artery (HCC) Yes    Ischemic stroke ([facility] )        Start Time: 1100  Stop Time: 1200  Total time in clinic (min): 60 minutes     Subjective: "I feel good today, but now I'm getting tired "     Objective: Pt seen for OT treatment session focusing on oculomotor re-ed, /VM, visual tolerance to inc engagement in ADL/IADL  Pt tolerated 5 min b/l monocular field taping while completing 4-word sentence unscramble to improve multidirectional saccades and visual tolerance/endurance in cluttered visual field  While participating in peripheral field taping, pt stated "this is more difficult" and felt fatigue post activity, but no reported inc in s/s HA/dizziness  Pt participated in [address] activities to inc screen tolerance, /VM skills, multidirectional saccades & oculomotor re-ed, pt reported geoboard act was "difficult" and completed rotator wheel in reverse alphabetical order & central fixation feature w/ 80% accuracy  Pt engaged in "word-a-round" activity for oculomotor re-ed, inc visual tolerance/endurance, and multidirectional saccades, and reported feeling "tired" post-activity  Assessment: Tolerated treatment well  Pt continues to demo deficits in visual act tolerance/endurance post activity, while demo dec in s/s HA/dizziness  Patient would benefit from continued skilled OT  Plan: Continued skilled OT per POC      INTERVENTION COMMENTS:  Diagnosis: Dissection of carotid artery ([facility] ) [I77 71]  Precautions: NO OVERHEAD LIFTING, no lifting >10 lbs  FOTO: n/a  Insurance: Payor: Opal Nix / Plan: Opal Nix / Product Type: Medicaid [facility] Francena Belt  0 BD 66 TFASGU, PN due 21

## 2021-02-19 ENCOUNTER — APPOINTMENT (OUTPATIENT)
Dept: PHYSICAL THERAPY | Facility: CLINIC | Age: 46
End: 2021-02-19
Payer: COMMERCIAL

## 2021-02-19 ENCOUNTER — APPOINTMENT (OUTPATIENT)
Dept: OCCUPATIONAL THERAPY | Facility: CLINIC | Age: 46
End: 2021-02-19
Payer: COMMERCIAL

## 2021-02-24 ENCOUNTER — OFFICE VISIT (OUTPATIENT)
Dept: PHYSICAL THERAPY | Facility: CLINIC | Age: 46
End: 2021-02-24
Payer: COMMERCIAL

## 2021-02-24 ENCOUNTER — OFFICE VISIT (OUTPATIENT)
Dept: OCCUPATIONAL THERAPY | Facility: CLINIC | Age: 46
End: 2021-02-24
Payer: COMMERCIAL

## 2021-02-24 DIAGNOSIS — I77.71 DISSECTION OF CAROTID ARTERY (HCC): Primary | ICD-10-CM

## 2021-02-24 PROCEDURE — 97110 THERAPEUTIC EXERCISES: CPT | Performed by: PHYSICAL THERAPIST

## 2021-02-24 PROCEDURE — 97112 NEUROMUSCULAR REEDUCATION: CPT | Performed by: PHYSICAL THERAPIST

## 2021-02-24 PROCEDURE — 97530 THERAPEUTIC ACTIVITIES: CPT

## 2021-02-24 PROCEDURE — 97112 NEUROMUSCULAR REEDUCATION: CPT

## 2021-02-24 NOTE — PROGRESS NOTES
Occupational Therapy Daily Note:    Today's date: 2021  Patient name: Yu Prieto  : 1975  MRN: [id number]  Referring provider: Sonal Bruner MD  Dx:   Encounter Diagnosis   Name Primary?  Dissection of carotid artery (HCC) Yes                  Subjective: "well now im tired"    Objective: Pt seen for OT treatment session focusing on /VM skills in multimodal environment  Pt tolerated 6 min monocular and b/l peripheral field taping to complete zip code/city organization and sequencing with use of slant boards for inc accuracy with head turns  Pt engaged in Spot the Difference Brain Box to address multidirectional saccades and sustained visual attention  Pt completed Change one letter worksheet while alternating visual and cognitive attention to 5 letter word unscramble  Assessment: Tolerated treatment well  Pt demo cognitive and visual fatigue following OT tx session today with increased time for processing and recall of 5 letters for unscramble task  Pt states Patient would benefit from continued skilled OT  Plan: Continued skilled OT per POC      INTERVENTION COMMENTS:  Diagnosis: Dissection of carotid artery ([facility] [address] ) [I77 71]  Precautions: NO OVERHEAD LIFTING, no lifting >10 lbs  FOTO: n/a  Insurance: Payor: Ace Kins / Plan: Ace Kins / Product Type: Medicaid [facility] Shelby Roof  23 XL 81 GSNPWN, PN due 21

## 2021-02-26 ENCOUNTER — OFFICE VISIT (OUTPATIENT)
Dept: PHYSICAL THERAPY | Facility: CLINIC | Age: 46
End: 2021-02-26
Payer: COMMERCIAL

## 2021-02-26 ENCOUNTER — EVALUATION (OUTPATIENT)
Dept: OCCUPATIONAL THERAPY | Facility: CLINIC | Age: 46
End: 2021-02-26
Payer: COMMERCIAL

## 2021-02-26 DIAGNOSIS — I77.71 DISSECTION OF CAROTID ARTERY (HCC): Primary | ICD-10-CM

## 2021-02-26 PROCEDURE — 97110 THERAPEUTIC EXERCISES: CPT

## 2021-02-26 PROCEDURE — 97150 GROUP THERAPEUTIC PROCEDURES: CPT | Performed by: PHYSICAL THERAPIST

## 2021-02-26 PROCEDURE — 97112 NEUROMUSCULAR REEDUCATION: CPT | Performed by: PHYSICAL THERAPIST

## 2021-02-26 PROCEDURE — 97168 OT RE-EVAL EST PLAN CARE: CPT

## 2021-02-26 PROCEDURE — 97110 THERAPEUTIC EXERCISES: CPT | Performed by: PHYSICAL THERAPIST

## 2021-02-26 PROCEDURE — 97530 THERAPEUTIC ACTIVITIES: CPT

## 2021-02-26 NOTE — PROGRESS NOTES
Daily Note     Today's date: 2021  Patient name: Cherri Cunningham  : 1975  MRN: [id number]  Referring provider: Kirsten Guerrero MD  Dx:   Encounter Diagnosis     ICD-10-CM    1  Dissection of carotid artery (HCC)  I77 71                   Subjective: went for a walk outside yesterday, felt good  Doing some gentl yoga, keep her head about her heart, mostly stretching  Objective: See treatment diary below  1:1 with PT from 9:00-9:25 = 25 min  Group 9:25 -9:35 - 10 min  Self directed 9:35-9:55 = 20 min     Assessment: Tolerated treatment well  Patient exhibited good technique with therapeutic exercises minimal need for use of rail with balance activities  Declined performance of yoga poses for today  Plan: Continue per plan of care  Precautions: Carotid occlusion      Manuals                                                        Neuro Re-Ed             Side stepping  3 laps ht/hn  Foam beam    Foam 3 laps ht/hn  3 laps ht/hn     Step taps  Foam beams tandem walking   3 laps      Foam beams 3 laps     Tandem ht/hn 15 ft x 8 Ht/hn 40'x 2    hallway 40'x 2  Ht/hn 40'x 2     VOR x 1 45 sec x 2 ea            bakcwards  HT/HN  // bars  3 laps     EO ht/hn 40'x 2  Ht/hn tandem 40'x 2  HT/HN  // bars  3 laps    SLS      Foam ball toss - 5 repetitions with and iwthout cog       Rocker board              Ht/hn hallway 40'x 2            bosu ball             VR             Tandem stance        EC 30'x 2     Static balance          Yoga poses  Tree pose  R sls 45"  L SLS 39"    Mod chair pose   L 32"  R: 23"    HR 92 BPM   Ther Ex             STS             gastroc stretch             Mini squtas on faom             treadmill  5 min warm up   1  3mph    REP        1  7mph x 3 min     3 min recovery 1 4 mph     3 min 1  8mph     cooldown   2 min recovery 1 4 mph                5 min warm up - RPE 2    4 min RPE 5    2 min recovery    4 min RPE 5    3 min cooldown RPE 2  5 min warm up - RPE 2    4 min RPE 5    2 min recovery    4 min RPE 5    3 min cooldown RPE 2  5 min warm up 1 3-1  4mph    1 7mph x 3 min     2 min recovery 1 3 mph     3 min 1  7mph                                                            Ther Activity                                       Gait Training                                       Modalities

## 2021-02-26 NOTE — PROGRESS NOTES
OCCUPATIONAL THERAPY PROGRESS UPDATE:    2021  Maria Teresa Reyna  1975  [id number]  Juanpablo Montoya MD  1  Dissection of carotid artery (HCC)          NO OVERHEAD LIFTING, NO LIFTING MORE THAN 5-10 LBS    Subjective    "I definitely feel like things are better"    PATIENT GOAL: "I would like to continue to improve my eyes"      Assessment/Plan    Skilled Analysis:  Pt is a 41 y o  female referred to Occupational Therapy s/p Dissection of carotid artery (HCC) [I77 71] s/p carotid artery dissection  Pt reports that since participation in OP OT services, she has noticed an improvement in her visual tolerance, UB endurance/strength and dec dizziness, eye strain  Pt is beginning to participate in school tasks and reports slight difficulty with processing and visual memory due to dec tolerance for fixation  Following formalized re-evaluation, pt demonstrates drastic improvements in visual motor and perceptual tolerance  Pt now able to complete binocular teaming with increased success, diplopia at 3 inches when unable to visualize on IE  In addition, pt demo G improvements in [address] [facility], and in hand manipulation skills  Pt continues to present with the following areas of deficit: endurance, activity tolerance, balance, decreased I w/ ADLS/IADLS, strength, visual deficits and poor screen tolerance, dec visual tolerance (static & dynamic)  and dec FMS of L UE  Pt does demo the need for continued skilled Occupational Therapy services 2x/week for another 4 weeks with focus on /VM skills with incorporation of cog loading tasks, visual and strength tolerance, return to work/school tasks, and HEP development to address the goals as listed below  POC to  w/in 90 days         Goals:    Short Term Goals (4-6wks)    VISION      · Pt will tolerate oculomotor skills for improved saccades, pursuits, con/divergent tasks for improved reading tolerance  and computer/screen tasks  with minimal increase in symptoms: PARTIALLY MET      · Pt will increase oculomotor control and tolerance for improved dynamic activities with head turns, board/screen to table tasks for 30 min with minimal increase in symptoms for improved school, life and work roles  :NOT MET    · Pt will demo ability to tolerate x 30minutes of screen time with minimal increase in symptoms to inc engagement and tolerance for school and work roles and salient tasks : NOT MET    · Pt will tolerate multi-modal envt x 30 min with cog load and min increase of symptoms  (2 levels or less) in HA/dizziness/nausea :NOT MET    · Pt will demo good carryover of self calming strategies for hypersensitivities for HA management and improved tolerance of cog load tasks :MET          MOTOR    · Pt will increase L UE strength to 4+/5 and  strength R=L, through the use of strengthening exercises and home program for eventual return to school, life and work roles and salient tasks NOT MET    · Pt will demo with G carryover of Home Exercise Program to improve functional progression towards goals in Plan of care and for improved functional use of RUE  MET       *goal added today 2/26/21  Short Term Goals: (4 weeks-6 weeks)    COGNITION      - Attention    o Pt will demo ability to participate in dual tasking/divided attention task with % accuracy in multimodal environment to simulate return to school and life roles    o Pt will demo ability to alternate attention between 2 tasks with cog loading and % accuracy with G retention of task directions in multimodal environment to simulate return to school and life  roles             Treatment Interventions  HEP development - theraputty  Visual tolerance tasks    - reading, board to table, screen to table, BITS, multidirectional saccades, pursuits    Multimatrix for saccades/ visual clutter/attention  Hypersensitivity strategies education  Multi-modal environment  Tracking tube  Oculomotor control:  saccades, convergence  Conv /div  Dynamic tasks  Edu on cog/vision apps  Millie colvin scanning sheets        Pain Levels:     Restin    With Activity:  4 (headaches)    Objective    Impairment Observations:      Upper Extremity       LUE RUE Comments                 UPPER EXTREMITY FXN Impaired Intact Dominant Hand: R                         /Pinch Strength         Dynamometer      - Gross Grasp 40 lbs 50 lbs improved   Pinch Meter       - PINCER 10 lbs 14 lbs     - TRIPOD 12 lbs 14 lbs     - LATERAL 16 lbs  19  lbs              SENSATION      Myofilaments (3 61 Wnl) 3 61 3 61    Sharp Dull  Intact Intact    Proprioception Intact Intact    Hot/Cold Temp Intact Intact          COORDINATION      9 Hole Peg Test 18 4 seconds 17 5 seconds improved   Fxnl Dexterity Test 19 5 seconds 15 33 seconds improved       Vision      R EYE     L EYE Comments                                        VISION SCREEN          NONE          Visual Acuity (near) 20/30 20/20 improved   Binocularity (near) orthotropia and orthophoria orthotropia and orthophoria    Binocularity (far) orthotropia and orthophoria orthotropia and orthophoria    Convergence  Diplopia seen at 3 inches   Improved tolerance, however eye strain reported  No dizziness   [employer]  and suppression of near   Y and suppression of near            Accommodation Blurriness reported at <5 inches   imporved   Pursuits smooth in all planes smooth in all planes No s/s   Saccades inaccurate in vertical planes inaccurate in vertical planes dec tolerance for repetitious saccades   Range of Motion [facility]    Visual perceptual midline              Midline at at            [place]  at at        [facility] engaged in trail making with theraweb with use of med resist clothes pin in LUE to place marbles to target  Pt completed tracing in mirror to address accuracy with  skills and sustained visual attention       INTERVENTION COMMENTS:  Diagnosis: Dissection of carotid artery ([address] ) [I14 71]  Precautions: NO OVERHEAD LIFTING, no lifting >10 lbs  FOTO: n/a  Insurance: Payor: Veto Huger / Plan: Veto Huger / Product Type: Medicaid [facility] Ed Danae  96 II 37 ANKUSH, PN due 3/27

## 2021-03-01 ENCOUNTER — HOSPITAL ENCOUNTER (INPATIENT)
Facility: HOSPITAL | Age: 46
LOS: 1 days | Discharge: HOME | DRG: 532 | End: 2021-03-04
Attending: EMERGENCY MEDICINE | Admitting: FAMILY MEDICINE
Payer: COMMERCIAL

## 2021-03-01 ENCOUNTER — APPOINTMENT (OUTPATIENT)
Dept: CT IMAGING | Facility: HOSPITAL | Age: 46
DRG: 532 | End: 2021-03-01
Payer: COMMERCIAL

## 2021-03-01 ENCOUNTER — APPOINTMENT (EMERGENCY)
Dept: RADIOLOGY | Facility: HOSPITAL | Age: 46
DRG: 532 | End: 2021-03-01
Payer: COMMERCIAL

## 2021-03-01 DIAGNOSIS — D64.9 ANEMIA: ICD-10-CM

## 2021-03-01 DIAGNOSIS — I63.9 ISCHEMIC STROKE (HCC): ICD-10-CM

## 2021-03-01 DIAGNOSIS — R79.89 RAISED TSH LEVEL: ICD-10-CM

## 2021-03-01 DIAGNOSIS — Z79.01 ANTICOAGULATED BY ANTICOAGULATION TREATMENT: ICD-10-CM

## 2021-03-01 DIAGNOSIS — I77.71 INTERNAL CAROTID ARTERY DISSECTION (HCC): ICD-10-CM

## 2021-03-01 DIAGNOSIS — N93.9 VAGINAL BLEEDING: ICD-10-CM

## 2021-03-01 DIAGNOSIS — N92.0 MENORRHAGIA: ICD-10-CM

## 2021-03-01 DIAGNOSIS — R55 NEAR SYNCOPE: Primary | ICD-10-CM

## 2021-03-01 LAB
ABO GROUP BLD: NORMAL
ABO GROUP BLD: NORMAL
ALBUMIN SERPL BCP-MCNC: 3.2 G/DL (ref 3.5–5)
ALP SERPL-CCNC: 44 U/L (ref 46–116)
ALT SERPL W P-5'-P-CCNC: 26 U/L (ref 12–78)
ANION GAP SERPL CALCULATED.3IONS-SCNC: 6 MMOL/L (ref 4–13)
ANION GAP SERPL CALCULATED.3IONS-SCNC: 9 MMOL/L (ref 4–13)
APTT PPP: 28 SECONDS (ref 23–37)
AST SERPL W P-5'-P-CCNC: 16 U/L (ref 5–45)
ATRIAL RATE: 99 BPM
BASOPHILS # BLD AUTO: 0.06 THOUSANDS/ΜL (ref 0–0.1)
BASOPHILS NFR BLD AUTO: 1 % (ref 0–1)
BILIRUB SERPL-MCNC: 0.18 MG/DL (ref 0.2–1)
BLD GP AB SCN SERPL QL: NEGATIVE
BUN SERPL-MCNC: 3 MG/DL (ref 5–25)
BUN SERPL-MCNC: 5 MG/DL (ref 5–25)
CALCIUM ALBUM COR SERPL-MCNC: 8.8 MG/DL (ref 8.3–10.1)
CALCIUM SERPL-MCNC: 8 MG/DL (ref 8.3–10.1)
CALCIUM SERPL-MCNC: 8.2 MG/DL (ref 8.3–10.1)
CHLORIDE SERPL-SCNC: 106 MMOL/L (ref 100–108)
CHLORIDE SERPL-SCNC: 110 MMOL/L (ref 100–108)
CO2 SERPL-SCNC: 26 MMOL/L (ref 21–32)
CO2 SERPL-SCNC: 27 MMOL/L (ref 21–32)
CREAT SERPL-MCNC: 0.82 MG/DL (ref 0.6–1.3)
CREAT SERPL-MCNC: 0.93 MG/DL (ref 0.6–1.3)
EOSINOPHIL # BLD AUTO: 0.19 THOUSAND/ΜL (ref 0–0.61)
EOSINOPHIL NFR BLD AUTO: 2 % (ref 0–6)
ERYTHROCYTE [DISTWIDTH] IN BLOOD BY AUTOMATED COUNT: 15.4 % (ref 11.6–15.1)
ERYTHROCYTE [DISTWIDTH] IN BLOOD BY AUTOMATED COUNT: 15.5 % (ref 11.6–15.1)
GFR SERPL CREATININE-BSD FRML MDRD: 75 ML/MIN/1.73SQ M
GFR SERPL CREATININE-BSD FRML MDRD: 87 ML/MIN/1.73SQ M
GLUCOSE P FAST SERPL-MCNC: 92 MG/DL (ref 65–99)
GLUCOSE SERPL-MCNC: 92 MG/DL (ref 65–140)
GLUCOSE SERPL-MCNC: 97 MG/DL (ref 65–140)
HCG SERPL QL: NEGATIVE
HCT VFR BLD AUTO: 27.2 % (ref 34.8–46.1)
HCT VFR BLD AUTO: 28.7 % (ref 34.8–46.1)
HCT VFR BLD AUTO: 29.4 % (ref 34.8–46.1)
HCT VFR BLD AUTO: 29.7 % (ref 34.8–46.1)
HGB BLD-MCNC: 8.5 G/DL (ref 11.5–15.4)
HGB BLD-MCNC: 8.9 G/DL (ref 11.5–15.4)
HGB BLD-MCNC: 9.3 G/DL (ref 11.5–15.4)
HGB BLD-MCNC: 9.3 G/DL (ref 11.5–15.4)
IMM GRANULOCYTES # BLD AUTO: 0.01 THOUSAND/UL (ref 0–0.2)
IMM GRANULOCYTES NFR BLD AUTO: 0 % (ref 0–2)
INR PPP: 1.14 (ref 0.84–1.19)
LYMPHOCYTES # BLD AUTO: 3.19 THOUSANDS/ΜL (ref 0.6–4.47)
LYMPHOCYTES NFR BLD AUTO: 36 % (ref 14–44)
MCH RBC QN AUTO: 26.2 PG (ref 26.8–34.3)
MCH RBC QN AUTO: 26.6 PG (ref 26.8–34.3)
MCHC RBC AUTO-ENTMCNC: 31.3 G/DL (ref 31.4–37.4)
MCHC RBC AUTO-ENTMCNC: 31.6 G/DL (ref 31.4–37.4)
MCV RBC AUTO: 84 FL (ref 82–98)
MCV RBC AUTO: 84 FL (ref 82–98)
MONOCYTES # BLD AUTO: 0.9 THOUSAND/ΜL (ref 0.17–1.22)
MONOCYTES NFR BLD AUTO: 10 % (ref 4–12)
NEUTROPHILS # BLD AUTO: 4.56 THOUSANDS/ΜL (ref 1.85–7.62)
NEUTS SEG NFR BLD AUTO: 51 % (ref 43–75)
NRBC BLD AUTO-RTO: 0 /100 WBCS
P AXIS: 38 DEGREES
PLATELET # BLD AUTO: 281 THOUSANDS/UL (ref 149–390)
PLATELET # BLD AUTO: 290 THOUSANDS/UL (ref 149–390)
PMV BLD AUTO: 11.1 FL (ref 8.9–12.7)
PMV BLD AUTO: 11.1 FL (ref 8.9–12.7)
POTASSIUM SERPL-SCNC: 3.5 MMOL/L (ref 3.5–5.3)
POTASSIUM SERPL-SCNC: 4.4 MMOL/L (ref 3.5–5.3)
PR INTERVAL: 144 MS
PROT SERPL-MCNC: 6.3 G/DL (ref 6.4–8.2)
PROTHROMBIN TIME: 14.7 SECONDS (ref 11.6–14.5)
QRS AXIS: 62 DEGREES
QRSD INTERVAL: 84 MS
QT INTERVAL: 314 MS
QTC INTERVAL: 402 MS
RBC # BLD AUTO: 3.25 MILLION/UL (ref 3.81–5.12)
RBC # BLD AUTO: 3.49 MILLION/UL (ref 3.81–5.12)
RH BLD: NEGATIVE
RH BLD: NEGATIVE
SODIUM SERPL-SCNC: 141 MMOL/L (ref 136–145)
SODIUM SERPL-SCNC: 143 MMOL/L (ref 136–145)
SPECIMEN EXPIRATION DATE: NORMAL
T WAVE AXIS: -18 DEGREES
T4 FREE SERPL-MCNC: 0.87 NG/DL (ref 0.76–1.46)
TROPONIN I SERPL-MCNC: <0.02 NG/ML
TSH SERPL DL<=0.05 MIU/L-ACNC: 5.32 UIU/ML (ref 0.36–3.74)
VENTRICULAR RATE: 99 BPM
WBC # BLD AUTO: 7.87 THOUSAND/UL (ref 4.31–10.16)
WBC # BLD AUTO: 8.91 THOUSAND/UL (ref 4.31–10.16)

## 2021-03-01 PROCEDURE — 84703 CHORIONIC GONADOTROPIN ASSAY: CPT | Performed by: EMERGENCY MEDICINE

## 2021-03-01 PROCEDURE — 99285 EMERGENCY DEPT VISIT HI MDM: CPT | Performed by: EMERGENCY MEDICINE

## 2021-03-01 PROCEDURE — 84484 ASSAY OF TROPONIN QUANT: CPT | Performed by: EMERGENCY MEDICINE

## 2021-03-01 PROCEDURE — 93010 ELECTROCARDIOGRAM REPORT: CPT | Performed by: INTERNAL MEDICINE

## 2021-03-01 PROCEDURE — 85730 THROMBOPLASTIN TIME PARTIAL: CPT | Performed by: EMERGENCY MEDICINE

## 2021-03-01 PROCEDURE — 86920 COMPATIBILITY TEST SPIN: CPT

## 2021-03-01 PROCEDURE — 84443 ASSAY THYROID STIM HORMONE: CPT | Performed by: EMERGENCY MEDICINE

## 2021-03-01 PROCEDURE — 80053 COMPREHEN METABOLIC PANEL: CPT | Performed by: EMERGENCY MEDICINE

## 2021-03-01 PROCEDURE — 84439 ASSAY OF FREE THYROXINE: CPT | Performed by: EMERGENCY MEDICINE

## 2021-03-01 PROCEDURE — 86850 RBC ANTIBODY SCREEN: CPT | Performed by: EMERGENCY MEDICINE

## 2021-03-01 PROCEDURE — 85014 HEMATOCRIT: CPT | Performed by: FAMILY MEDICINE

## 2021-03-01 PROCEDURE — 85027 COMPLETE CBC AUTOMATED: CPT | Performed by: INTERNAL MEDICINE

## 2021-03-01 PROCEDURE — 70496 CT ANGIOGRAPHY HEAD: CPT

## 2021-03-01 PROCEDURE — 80048 BASIC METABOLIC PNL TOTAL CA: CPT | Performed by: INTERNAL MEDICINE

## 2021-03-01 PROCEDURE — 96361 HYDRATE IV INFUSION ADD-ON: CPT

## 2021-03-01 PROCEDURE — 30233N1 TRANSFUSION OF NONAUTOLOGOUS RED BLOOD CELLS INTO PERIPHERAL VEIN, PERCUTANEOUS APPROACH: ICD-10-PCS | Performed by: EMERGENCY MEDICINE

## 2021-03-01 PROCEDURE — 96374 THER/PROPH/DIAG INJ IV PUSH: CPT

## 2021-03-01 PROCEDURE — NC001 PR NO CHARGE: Performed by: OBSTETRICS & GYNECOLOGY

## 2021-03-01 PROCEDURE — 85025 COMPLETE CBC W/AUTO DIFF WBC: CPT | Performed by: EMERGENCY MEDICINE

## 2021-03-01 PROCEDURE — 99220 PR INITIAL OBSERVATION CARE/DAY 70 MINUTES: CPT | Performed by: INTERNAL MEDICINE

## 2021-03-01 PROCEDURE — 86901 BLOOD TYPING SEROLOGIC RH(D): CPT | Performed by: EMERGENCY MEDICINE

## 2021-03-01 PROCEDURE — 99285 EMERGENCY DEPT VISIT HI MDM: CPT

## 2021-03-01 PROCEDURE — 86900 BLOOD TYPING SEROLOGIC ABO: CPT | Performed by: EMERGENCY MEDICINE

## 2021-03-01 PROCEDURE — 85014 HEMATOCRIT: CPT | Performed by: INTERNAL MEDICINE

## 2021-03-01 PROCEDURE — 36415 COLL VENOUS BLD VENIPUNCTURE: CPT | Performed by: EMERGENCY MEDICINE

## 2021-03-01 PROCEDURE — 85610 PROTHROMBIN TIME: CPT | Performed by: EMERGENCY MEDICINE

## 2021-03-01 PROCEDURE — 93005 ELECTROCARDIOGRAM TRACING: CPT

## 2021-03-01 PROCEDURE — 36430 TRANSFUSION BLD/BLD COMPNT: CPT

## 2021-03-01 PROCEDURE — G1004 CDSM NDSC: HCPCS

## 2021-03-01 PROCEDURE — 85018 HEMOGLOBIN: CPT | Performed by: INTERNAL MEDICINE

## 2021-03-01 PROCEDURE — 85018 HEMOGLOBIN: CPT | Performed by: FAMILY MEDICINE

## 2021-03-01 PROCEDURE — 70498 CT ANGIOGRAPHY NECK: CPT

## 2021-03-01 PROCEDURE — P9016 RBC LEUKOCYTES REDUCED: HCPCS

## 2021-03-01 PROCEDURE — 71045 X-RAY EXAM CHEST 1 VIEW: CPT

## 2021-03-01 RX ORDER — ALPRAZOLAM 0.25 MG/1
0.5 TABLET ORAL ONCE
Status: COMPLETED | OUTPATIENT
Start: 2021-03-01 | End: 2021-03-01

## 2021-03-01 RX ORDER — MEDROXYPROGESTERONE ACETATE 10 MG/1
10 TABLET ORAL DAILY
Qty: 10 TABLET | Refills: 0 | Status: SHIPPED | OUTPATIENT
Start: 2021-03-01 | End: 2021-03-04 | Stop reason: HOSPADM

## 2021-03-01 RX ORDER — ONDANSETRON 2 MG/ML
4 INJECTION INTRAMUSCULAR; INTRAVENOUS ONCE
Status: COMPLETED | OUTPATIENT
Start: 2021-03-01 | End: 2021-03-01

## 2021-03-01 RX ORDER — ASPIRIN 81 MG/1
81 TABLET ORAL DAILY
Status: DISCONTINUED | OUTPATIENT
Start: 2021-03-01 | End: 2021-03-04 | Stop reason: HOSPADM

## 2021-03-01 RX ORDER — UREA 10 %
500 LOTION (ML) TOPICAL DAILY
Status: DISCONTINUED | OUTPATIENT
Start: 2021-03-01 | End: 2021-03-04 | Stop reason: HOSPADM

## 2021-03-01 RX ORDER — MEDROXYPROGESTERONE ACETATE 2.5 MG/1
5 TABLET ORAL DAILY
Status: DISCONTINUED | OUTPATIENT
Start: 2021-03-01 | End: 2021-03-04 | Stop reason: HOSPADM

## 2021-03-01 RX ORDER — ATORVASTATIN CALCIUM 40 MG/1
40 TABLET, FILM COATED ORAL EVERY EVENING
Status: DISCONTINUED | OUTPATIENT
Start: 2021-03-01 | End: 2021-03-04 | Stop reason: HOSPADM

## 2021-03-01 RX ORDER — SODIUM CHLORIDE 9 MG/ML
125 INJECTION, SOLUTION INTRAVENOUS CONTINUOUS
Status: DISCONTINUED | OUTPATIENT
Start: 2021-03-01 | End: 2021-03-04

## 2021-03-01 RX ADMIN — RIVAROXABAN 15 MG: 15 TABLET, FILM COATED ORAL at 09:10

## 2021-03-01 RX ADMIN — ASPIRIN 81 MG: 81 TABLET, COATED ORAL at 09:10

## 2021-03-01 RX ADMIN — ONDANSETRON 4 MG: 2 INJECTION INTRAMUSCULAR; INTRAVENOUS at 00:49

## 2021-03-01 RX ADMIN — MEDROXYPROGESTERONE ACETATE 5 MG: 2.5 TABLET ORAL at 09:50

## 2021-03-01 RX ADMIN — ATORVASTATIN CALCIUM 40 MG: 40 TABLET, FILM COATED ORAL at 17:54

## 2021-03-01 RX ADMIN — SODIUM CHLORIDE 1000 ML: 0.9 INJECTION, SOLUTION INTRAVENOUS at 00:35

## 2021-03-01 RX ADMIN — IOHEXOL 85 ML: 350 INJECTION, SOLUTION INTRAVENOUS at 19:23

## 2021-03-01 RX ADMIN — Medication 500 MG: at 09:50

## 2021-03-01 RX ADMIN — SODIUM CHLORIDE 125 ML/HR: 0.9 INJECTION, SOLUTION INTRAVENOUS at 01:26

## 2021-03-01 RX ADMIN — ALPRAZOLAM 0.5 MG: 0.25 TABLET ORAL at 04:55

## 2021-03-01 NOTE — ED NOTES
Patient assisted to and from bedside commode  Changed bed pad and replaced patient's undergarments  Patient's urine is bloody with multiple clots       Chapo Dong RN  03/01/21 [id number]

## 2021-03-01 NOTE — ED NOTES
Per Dr Kvng Coburn, hold 2nd unit of PRBC at this time  Collected H&H   Pt updated     Ramses Marin RN  03/01/21 [id number]

## 2021-03-01 NOTE — H&P
H&P- Lesa Weeks 1975, 41 y o  female MRN: [id number]    Unit/Bed#: ED 27 Encounter: [id number]    Primary Care Provider: Brooklyn Lambert MD   Date and time admitted to hospital: 3/1/2021 12:22 AM    * Vaginal bleeding  Assessment & Plan  · Presenting with 8 days of menorrhagia  Now presenting with dizziness and pre-syncope  On Xarelto since December for carotid dissection  Heavy menstrual periods since then  · Hemoglobin 8 5 on admission  · Ordered 1 unit RBCs in the ED  · OB/GYN consulted by ED    Plan:  · Monitor H&H after transfusion  · Start patient on 5- 10 days of Provera per OB/GYN recs  · Recommend outpatient follow up with OB/GYN for long term options  · Continue Xarelto  · Recommend possible referral to Heme/Onc for IV iron if patient is intolerant of oral  She does take liquid iron supplements  Anemia  Assessment & Plan  · 2 year history of iron deficiency anemia now presenting with acute blood loss anemia in the setting of menorrhagia and Xarelto  · Home medication: Floradix (iron and herb liquid supplement)  Patient reports intolerance to tablet form of iron due to constipation    Plan:  · Please see details under "Vaginal Bleeding"     Ischemic stroke [facility])  Assessment & Plan  MRI 12/31/2020: Right frontal focus of cortical acute to subacute ischemia  Followed by Neurology as an outpatient  Continue aspirin and Xarelto    Internal carotid artery dissection [facility])  Assessment & Plan  Presented for admission on 12/26/2020 with a persistent headache which began after chiropractic manipulation of her neck on 12/20/2020  Amandeep Day CTA head and neck: "High-grade stenosis terminating in occlusion at the C1 level of the right cervical ICA suggesting dissection  There is decreased flow distally in the right cervical and right cavernous ICA possibly from retrograde flow " She was seen by Neurosurgery and Vascular Surgery with no acute interventions recommended   She was discharged with Xarelto and aspirin 81 mg daily  PT/OT as an outpatient due to balance and coordination issues  VTE Prophylaxis: Rivaroxaban (Xarelto)  / sequential compression device   Code Status: Level 1  POLST: POLST form is not discussed and not completed at this time  Anticipated Length of Stay:  Patient will be admitted on an Observation basis with an anticipated length of stay of  < 2 midnights  Justification for Hospital Stay: Acute blood anemia    Chief Complaint:   Lightheadeness    History of Present Illness:    Mariposa Quiroz is a 41 y o  female with a past medical history of CVA, internal carotid artery dissection, anticoagulation with Xarelto and iron deficiency anemia who presents with lightheadedness  Patient started her menstrual period 8 days ago  It has been very heavy, requiring to use overnight pads with changes every hour  She states her menstrual periods since starting Xarelto in December 2020 for CVA have been very heavy  Today as she was getting up from a seated position, she became very lightheaded and felt pre-syncopal  Prior to starting Xarelto, she had menstrual periods lasting 3-4 days  She has a history of iron deficiency for which she takes a supplement Floradix  She states the tablet form of iron causes constipation  She denies SOB, chest pain, abdominal pain, easy bruising, or blood in her stools  Difficult to tell now with her menstrual periods but no hematuria prior to starting her period  In the ED, she was found to be anemic with a hemoglobin of 8 5  ED ordered blood transfusion and consent was obtained  OB/GYN also saw patient in the ED  Review of Systems:    Review of Systems   Constitutional: Positive for fatigue  Negative for chills and fever  HENT: Negative for nosebleeds  Eyes: Negative for visual disturbance  Respiratory: Negative for shortness of breath  Cardiovascular: Negative for chest pain and leg swelling     Gastrointestinal: Negative for abdominal pain, anal bleeding, nausea and vomiting  Genitourinary: Positive for vaginal bleeding  Negative for difficulty urinating  Musculoskeletal: Negative for myalgias  Skin: Negative for color change  Neurological: Positive for dizziness, weakness and light-headedness  Negative for syncope and headaches  Hematological: Does not bruise/bleed easily  Psychiatric/Behavioral: Negative for agitation  Past Medical and Surgical History:     Past Medical History:   Diagnosis Date    Asthma     ACTIVITY INDUCED ASTHMA    IBS (irritable bowel syndrome)        Past Surgical History:   Procedure Laterality Date    ABDOMINAL SURGERY      LAPROSCOPY FOR ENDOMETRIOSIS    CERVICAL BIOPSY  W/ LOOP ELECTRODE EXCISION      FINGER SURGERY      LEFT MIDDLE FINGER SURGERY 5 YRS AGO    TUBAL LIGATION         Meds/Allergies:    Prior to Admission medications    Medication Sig Start Date End Date Taking?  Authorizing Provider   atorvastatin (LIPITOR) 40 mg tablet Take 1 tablet (40 mg total) by mouth every evening 1/1/21  Yes Piotr Og MD   cholecalciferol (VITAMIN D3) 1,000 units tablet Take 1,000 Units by mouth daily   Yes Historical Provider, MD   Lysine 500 MG CAPS Take by mouth daily   Yes Historical Provider, MD   Magnesium 250 MG TABS Take 2 capsules by mouth daily    Yes Historical Provider, MD   NON FORMULARY Floradix Iron supplement   Yes Historical Provider, MD   rivaroxaban (XARELTO) 15 mg tablet Take 1 tablet (15 mg total) by mouth daily with breakfast for 21 days 12/28/20 3/1/21 Yes [employer], DO   aspirin (ECOTRIN LOW STRENGTH) 81 mg EC tablet Take 1 tablet (81 mg total) by mouth daily 12/31/20 2/10/21  Lulu Lenug MD   b complex vitamins tablet Take 1 tablet by mouth daily    Historical Provider, MD   CHLOROPHYLL PO Take 1 Dose by mouth daily    Historical Provider, MD   Cholecalciferol 50 MCG (2000 UT) CAPS Take 1 capsule by mouth daily    Historical Provider, MD   gabapentin (NEURONTIN) 100 mg capsule Take 1 capsule (100 mg total) by mouth 3 (three) times a day as needed (headache)  Patient not taking: Reported on 3/1/2021 1/1/21   Rivera Velasco MD   medroxyPROGESTERone (PROVERA) 10 mg tablet Take 1 tablet (10 mg total) by mouth daily Take 1 daily for 5-10 days as needed for vaginal bleeidng 3/1/21   Brit Clemons MD   levalbuterol [facility]) 45 mcg/act inhaler Inhale 2 puffs daily as needed  3/1/21  Historical Provider, MD   LYSINE PO Take 1 tablet by mouth daily  3/1/21  Historical Provider, MD   multivitamin [facility]) TABS Take 1 tablet by mouth daily  3/1/21  Historical Provider, MD   ondansetron (ZOFRAN) 4 mg tablet every 6 (six) hours as needed 12/24/20 3/1/21  Historical Provider, MD   Spirulina 500 MG TABS Take 1 tablet by mouth daily  3/1/21  Historical Provider, MD   tiZANidine (ZANAFLEX) 2 mg tablet Take 1 tablet (2 mg total) by mouth every 8 (eight) hours as needed for muscle spasms  Patient not taking: Reported on 1/18/2021 1/6/21 3/1/21  Rivera Velasco MD   UNKNOWN TO PATIENT   3/1/21  Historical Provider, MD   UNKNOWN TO PATIENT   3/1/21  Historical Provider, MD   UNKNOWN TO PATIENT   3/1/21  Historical Provider, MD   UNKNOWN TO PATIENT   3/1/21  Historical Provider, MD   vitamin B-12 (VITAMIN B-12) 1,000 mcg tablet Take 1,000 mcg by mouth daily  3/1/21  Historical Provider, MD     I have reviewed home medications with patient personally  Allergies: Allergies   Allergen Reactions    Levofloxacin Other (See Comments) and Hives     "nervous system reaction, anxiety for 2 months"      Metronidazole Hives    Nuts Other (See Comments)     Allergy showed up on skin test    Sulfa Antibiotics     Latex Rash     The adhesive bothers her more         Social History:     Marital Status:    Occupation:   Patient Pre-hospital Living Situation: Independent  Patient Pre-hospital Level of Mobility: None (working with PT/OT for coordination s/p CVA)  Patient Pre-hospital Diet Restrictions: Gluten intolerance, dairy intolerance, vegetarian (does eat fish)  Substance Use History:   Social History     Substance and Sexual Activity   Alcohol Use Not Currently     Social History     Tobacco Use   Smoking Status Former Smoker    Quit date: 2/10/2000    Years since quittin 0   Smokeless Tobacco Never Used   Tobacco Comment    NO SMOKING IN 23 YEARS     Social History     Substance and Sexual Activity   Drug Use No       Family History:    non-contributory    Physical Exam:     Vitals:   Blood Pressure: 107/61 (21)  Pulse: 72 (21)  Temperature: 98 6 °F (37 °C) (21 0500)  Temp Source: Oral (21 050)  Respirations: 17 (21)  Height: 5' 4 5" (163 8 cm) (21)  Weight - Scale: 74 8 kg (164 lb 14 5 oz) (21)  SpO2: 99 % (21)    Physical Exam  Constitutional:       Appearance: Normal appearance  Cardiovascular:      Rate and Rhythm: Normal rate and regular rhythm  Pulmonary:      Breath sounds: Normal breath sounds  Abdominal:      General: Bowel sounds are normal       Palpations: Abdomen is soft  Tenderness: There is no abdominal tenderness  Musculoskeletal:      Right lower leg: No edema  Left lower leg: No edema  Skin:     Coloration: Skin is pale  Neurological:      General: No focal deficit present  Mental Status: She is alert and oriented to person, place, and time  Psychiatric:         Mood and Affect: Mood normal          Behavior: Behavior normal            Additional Data:     Lab Results: I have personally reviewed pertinent reports        Results from last 7 days   Lab Units 21  0034   WBC Thousand/uL 8 91   HEMOGLOBIN g/dL 8 5*   HEMATOCRIT % 27 2*   PLATELETS Thousands/uL 290   NEUTROS PCT % 51   LYMPHS PCT % 36   MONOS PCT % 10   EOS PCT % 2     Results from last 7 days   Lab Units 21  0034   POTASSIUM mmol/L 3 5   CHLORIDE mmol/L 106   CO2 mmol/L 26   BUN mg/dL 3*   CREATININE mg/dL 0 93   CALCIUM mg/dL 8 2*   ALK PHOS U/L 44*   ALT U/L 26   AST U/L 16     Results from last 7 days   Lab Units 03/01/21  0034   INR  1 14       Imaging: I have personally reviewed pertinent reports  No results found  EKG, Pathology, and Other Studies Reviewed on Admission:   · EKG: Normal sinus rhythm     Epic / Care Everywhere Records Reviewed: Yes     ** Please Note: This note has been constructed using a voice recognition system   **

## 2021-03-01 NOTE — ED NOTES
OBGYN Resident at bedside to evaluate patient       Jose Gonzalez RN  03/01/21 63 Carla Stafford RN  03/01/21 [id number]

## 2021-03-01 NOTE — CONSULTS
Consultation - Gynecology  Lucy Jacobo 41 y o  female MRN: [id number]  Unit/Bed#: ED 27 Encounter: [id number]      Inpatient consult to Obstetrics / Gynecology  Consult performed by: Cleola Saint, MD  Consult ordered by: Rodrigo Waterman MD  Reason for consult: vaginal bleeding  Assessment/Recommendations: ABLA secondary to vaginal bleeding in setting of anticoagulation            Chief Complaint   Patient presents with    Vaginal Bleeding     PT presents to ED via EMS from home after having vaginal bleeding for 8 days  Pt on several blood thinners  VSS  History of Present Illness   Physician Requesting Consult: Rodrigo Waterman MD  Reason for Consult / Principal Problem: Vaginal bleeding, acute blood loss anemia  Subspeciality: General GYN  HPI: Lucy Jacobo is a 41 y o  female who presents with vaginal bleeding and ABLA in the setting of anticoagulation for a stroke and carotid artery dissection  This is her normal period (LMP 2/21/21) however her bleeding is the worst ist been since before her hysteroscopy polypectomy in May 2020 with [facility]  She was passing clots today and had a near synocopal episode at home followed by an anxiety attack  Otherwise denies LOC, headache, or orthostatic changes  Gyn history is significant for a tubal ligation and LEEP before the birth of her son  Denies h/o STIs  OB hx significant for 3 vaginal deliveries      Review of Systems   Constitutional: Negative  HENT: Negative  Eyes: Negative  Respiratory: Negative  Cardiovascular: Negative  Gastrointestinal: Negative  Endocrine: Negative  Genitourinary: Positive for vaginal bleeding  Musculoskeletal: Negative  Skin: Positive for pallor  Neurological: Positive for light-headedness  Psychiatric/Behavioral: Negative          Historical Information   Past Medical History:   Diagnosis Date    Asthma     ACTIVITY INDUCED ASTHMA    IBS (irritable bowel syndrome)      Past Surgical History:   Procedure Laterality Date    ABDOMINAL SURGERY      LAPROSCOPY FOR ENDOMETRIOSIS    CERVICAL BIOPSY  W/ LOOP ELECTRODE EXCISION      FINGER SURGERY      LEFT MIDDLE FINGER SURGERY 5 YRS AGO    TUBAL LIGATION       OB History   No obstetric history on file  Family History   Problem Relation Age of Onset    Rheum arthritis Mother     Diverticulitis Father    Jer Martinez Gout Brother     Alzheimer's disease Maternal Grandmother     Transient ischemic attack Maternal Grandmother     Aneurysm Maternal Grandfather     Hypertension Paternal Grandmother     Dementia Paternal Grandmother     COPD Paternal Grandfather     Hypertension Paternal Grandfather     Emphysema Paternal Grandfather      Social History   Social History     Substance and Sexual Activity   Alcohol Use Not Currently     Social History     Substance and Sexual Activity   Drug Use No     Social History     Tobacco Use   Smoking Status Former Smoker    Quit date: 2/10/2000    Years since quittin 0   Smokeless Tobacco Never Used   Tobacco Comment    NO SMOKING IN 19 YEARS       Meds/Allergies   Current Facility-Administered Medications   Medication Dose Route Frequency    sodium chloride 0 9 % infusion  125 mL/hr Intravenous Continuous         Allergies   Allergen Reactions    Levofloxacin Other (See Comments) and Hives     "nervous system reaction, anxiety for 2 months"   Metronidazole Hives    Nuts Other (See Comments)     Allergy showed up on skin test    Sulfa Antibiotics     Latex Rash     The adhesive bothers her more         Objective   Vitals: Blood pressure 125/68, pulse 102, temperature 98 °F (36 7 °C), temperature source Oral, resp  rate 16, height 5' 4 5" (1 638 m), weight 74 8 kg (164 lb 14 5 oz), last menstrual period 2021, SpO2 100 %, not currently breastfeeding  Body mass index is 27 87 kg/m²        Intake/Output Summary (Last 24 hours) at 3/1/2021 0209  Last data filed at 3/1/2021 2901  Gross per 24 hour   Intake 1000 ml   Output --   Net 1000 ml       Invasive Devices     Peripheral Intravenous Line            Peripheral IV 03/01/21 Left Antecubital less than 1 day                Physical Exam  Constitutional:       General: She is not in acute distress  Appearance: She is well-developed  She is not diaphoretic  HENT:      Head: Normocephalic and atraumatic  Eyes:      Pupils: Pupils are equal, round, and reactive to light  Neck:      Musculoskeletal: Normal range of motion  Trachea: No tracheal deviation  Cardiovascular:      Rate and Rhythm: Normal rate  Pulmonary:      Effort: Pulmonary effort is normal  No respiratory distress  Breath sounds: No stridor  Abdominal:      General: There is no distension  Palpations: Abdomen is soft  Tenderness: There is no abdominal tenderness  There is no guarding or rebound  Hernia: No hernia is present  Genitourinary:     General: Normal vulva  Vagina: Normal       Cervix: Dilated  Cervical bleeding present  No discharge, friability, lesion, erythema or eversion  Musculoskeletal: Normal range of motion  General: No tenderness or deformity  Skin:     General: Skin is warm and dry  Coloration: Skin is not pale  Findings: No erythema or rash  Neurological:      Mental Status: She is alert and oriented to person, place, and time        Coordination: Coordination normal          Lab Results:   Recent Results (from the past 24 hour(s))   CBC and differential    Collection Time: 03/01/21 12:34 AM   Result Value Ref Range    WBC 8 91 4 31 - 10 16 Thousand/uL    RBC 3 25 (L) 3 81 - 5 12 Million/uL    Hemoglobin 8 5 (L) 11 5 - 15 4 g/dL    Hematocrit 27 2 (L) 34 8 - 46 1 %    MCV 84 82 - 98 fL    MCH 26 2 (L) 26 8 - 34 3 pg    MCHC 31 3 (L) 31 4 - 37 4 g/dL    RDW 15 4 (H) 11 6 - 15 1 %    MPV 11 1 8 9 - 12 7 fL    Platelets 121 759 - 240 Thousands/uL    nRBC 0 /100 WBCs    Neutrophils Relative 51 43 - 75 %    Immat GRANS % 0 0 - 2 %    Lymphocytes Relative 36 14 - 44 %    Monocytes Relative 10 4 - 12 %    Eosinophils Relative 2 0 - 6 %    Basophils Relative 1 0 - 1 %    Neutrophils Absolute 4 56 1 85 - 7 62 Thousands/µL    Immature Grans Absolute 0 01 0 00 - 0 20 Thousand/uL    Lymphocytes Absolute 3 19 0 60 - 4 47 Thousands/µL    Monocytes Absolute 0 90 0 17 - 1 22 Thousand/µL    Eosinophils Absolute 0 19 0 00 - 0 61 Thousand/µL    Basophils Absolute 0 06 0 00 - 0 10 Thousands/µL   Protime-INR    Collection Time: 03/01/21 12:34 AM   Result Value Ref Range    Protime 14 7 (H) 11 6 - 14 5 seconds    INR 1 14 0 84 - 1 19   APTT    Collection Time: 03/01/21 12:34 AM   Result Value Ref Range    PTT 28 23 - 37 seconds   Type and screen    Collection Time: 03/01/21 12:34 AM   Result Value Ref Range    ABO Grouping A     Rh Factor Negative     Antibody Screen Negative     Specimen Expiration Date [id number]    Comprehensive metabolic panel    Collection Time: 03/01/21 12:34 AM   Result Value Ref Range    Sodium 141 136 - 145 mmol/L    Potassium 3 5 3 5 - 5 3 mmol/L    Chloride 106 100 - 108 mmol/L    CO2 26 21 - 32 mmol/L    ANION GAP 9 4 - 13 mmol/L    BUN 3 (L) 5 - 25 mg/dL    Creatinine 0 93 0 60 - 1 30 mg/dL    Glucose 97 65 - 140 mg/dL    Calcium 8 2 (L) 8 3 - 10 1 mg/dL    Corrected Calcium 8 8 8 3 - 10 1 mg/dL    AST 16 5 - 45 U/L    ALT 26 12 - 78 U/L    Alkaline Phosphatase 44 (L) 46 - 116 U/L    Total Protein 6 3 (L) 6 4 - 8 2 g/dL    Albumin 3 2 (L) 3 5 - 5 0 g/dL    Total Bilirubin 0 18 (L) 0 20 - 1 00 mg/dL    eGFR 75 ml/min/1 73sq m   TSH, 3rd generation with Free T4 reflex    Collection Time: 03/01/21 12:34 AM   Result Value Ref Range    TSH 3RD GENERATON 5 320 (H) 0 358 - 3 740 uIU/mL   hCG, qualitative pregnancy    Collection Time: 03/01/21 12:34 AM   Result Value Ref Range    Preg, Serum Negative Negative   Troponin I    Collection Time: 03/01/21 12:34 AM   Result Value Ref Range    Troponin I <0 02 <=0 04 ng/mL       Assessment/Plan     Assessment:  46yo P3 with vaginal bleeding and ABLA in the setting of medically necessary anticoagulation  Plan:  Recommend 5-10 day course of Provera  Do not recommend Lysteda at this time due to medical history  Recommend transfusion  Recommend f/u with OBGYN to discuss loget term option to manage bleeding such as IUD or ablation    Code Status: Prior  Advance Directive and Living Will:      Power of :    POLST:      Counseling / Coordination of Care  Total floor / unit time spent today20 minutes  minutes  Greater than 50% of total time was spent with the patient and / or family counseling and / or coordination of care   A description of the counseling / coordination of care: medical management, risks/benefits/alkternative    Bradleybart Gomez MD  3/1/2021  2:09 AM

## 2021-03-01 NOTE — ED NOTES
Pt sleeping on stretcher with HOB elevated in negative distress  Respirations regular and non-labored       Stephanie Dobson RN  03/01/21 [id number]

## 2021-03-01 NOTE — ED NOTES
Pt laying on stretcher with HOB elevated in negative distress  Pt now requesting something to help calm her nerves  On Call [facility] notified  VSS  IVF's and blood transfusion infusing with negative complications  Will continue to monitor       Elisabeth Erazo RN  03/01/21 7281

## 2021-03-01 NOTE — QUICK NOTE
Senior Resident Supervision Note - Admission  [facility] Internal Medicine Residency    Patient Information:     Name: Rebecca Rios    MRN: [id number]  Age / Sex: 41 y o  female  Unit/Bed #: @[facility] (,11573)  @ Encounter: [id number]    Senior Resident Statement:    Patient seen and examined personally  History, assessment, and plan detailed below as well as all orders were reviewed with PGY1 resident, Dr Neha Nickerson  I agree with the assessment and plan with the following additions / corrections  See Resident H&P for details  Assessment/Plan: Principal Problem:    Vaginal bleeding  Active Problems:    Internal carotid artery dissection [facility])    Anemia     41year old female with a history of stroke secondary to carotid artery dissection (R/S) on xeralto since December 2020, presents with a near syncope after getting up from a seated position at home today  She has been having menorrhagia since initiation of xeralto, currently on day 8 of heavy bleeding  Only prior gynecological issue was a D and C done in May for an intrauterine cyst  No abnormal uterine bleeding history  She is tachycardic, but has stable blood pressure  Normal renal functions, no troponin elevation  Hemoglobin is 8 5, a drop from 13 4 in December  Examination: pale, CRT<2, good pulses, tachycardia, normal auscultation  Abdomen soft, non tender  Seen by GYN in the ED    Plan  1  Will transfuse 1 U PRBC  2  Repeat HB post transfusion  3  Will start Provera 5-10 day course per GYN recommendations  4  Continue aspirin and xeralto as patient is hemodynamically stable  5   Out patient GYN follow up for further management of menorrhagia    Disposition:  OBSERVATION    Expected LOS: <2 Rebecca Moscoso MD

## 2021-03-01 NOTE — ASSESSMENT & PLAN NOTE
· Presenting with 8 days of menorrhagia  Now presenting with dizziness and pre-syncope  On Xarelto since December for carotid dissection  Heavy menstrual periods since then  · Hemoglobin 8 5 on admission  · Ordered 1 unit RBCs in the ED  · OB/GYN consulted by ED    Plan:  · Monitor H&H after transfusion  · Start patient on 5- 10 days of Provera per OB/GYN recs  · Recommend outpatient follow up with OB/GYN for long term options  · Continue Xarelto  · Recommend possible referral to Heme/Onc for IV iron if patient is intolerant of oral  She does take liquid iron supplements

## 2021-03-01 NOTE — ASSESSMENT & PLAN NOTE
Presented for admission on 12/26/2020 with a persistent headache which began after chiropractic manipulation of her neck on 12/20/2020  Amandeep Day CTA head and neck: "High-grade stenosis terminating in occlusion at the C1 level of the right cervical ICA suggesting dissection  There is decreased flow distally in the right cervical and right cavernous ICA possibly from retrograde flow " She was seen by Neurosurgery and Vascular Surgery with no acute interventions recommended  She was discharged with Xarelto and aspirin 81 mg daily  PT/OT as an outpatient due to balance and coordination issues

## 2021-03-01 NOTE — ED NOTES
Pt laying awake on stretcher with HOB elevated, daughter at bedside  Pt admitted she felt a little anxious, refused medication at present time  Pt assisted onto bedpan with negative complications  Pt able to reposition self with negative complications  Will continue to monitor       Karen Matthews RN  03/01/21 1953

## 2021-03-01 NOTE — ASSESSMENT & PLAN NOTE
· 2 year history of iron deficiency anemia now presenting with acute blood loss anemia in the setting of menorrhagia and Xarelto  · Home medication: Floradix (iron and herb liquid supplement)   Patient reports intolerance to tablet form of iron due to constipation    Plan:  · Please see details under "Vaginal Bleeding"

## 2021-03-01 NOTE — ASSESSMENT & PLAN NOTE
MRI 12/31/2020: Right frontal focus of cortical acute to subacute ischemia    Followed by Neurology as an outpatient  Continue aspirin and Xarelto

## 2021-03-01 NOTE — ED NOTES
Assisted pt to bedside commode  Urine was red with multiple blood clots  Pt is currently back in bed resting comfortably        Refugia Hum  03/01/21 7820

## 2021-03-01 NOTE — ED PROVIDER NOTES
History  Chief Complaint   Patient presents with    Vaginal Bleeding     PT presents to ED via EMS from home after having vaginal bleeding for 8 days  Pt on several blood thinners  VSS  Patient is a 41year old female with near syncope tonight with nausea  Has had vaginal bleeding for past 8 days but worse since yesterday and has gone through multiple pads and has had clots  Patient is on xarelto and ASA  Was last seen in this ED on 12/31/20 for CVA and carotid artery dissection  LMP now  No chest pain or sob  No travel  No fever  No vomiting or diarrhea  No GI bleeding  [place] [facility] website checked on this patient and no Rx found  (+) fatigue and weakness  Has had prior TL  GYN: Dr Sydni Stern at [facility]  History provided by:  Patient and EMS personnel   used: No    Vaginal Bleeding  Associated symptoms: fatigue and nausea    Associated symptoms: no fever        Prior to Admission Medications   Prescriptions Last Dose Informant Patient Reported? Taking?    CHLOROPHYLL PO Not Taking at Unknown time  Yes No   Sig: Take 1 Dose by mouth daily   Cholecalciferol 50 MCG (2000 UT) CAPS   Yes No   Sig: Take 1 capsule by mouth daily   Lysine 500 MG CAPS 2/28/2021 at Unknown time  Yes Yes   Sig: Take by mouth daily   Magnesium 400 MG CAPS 2/28/2021 at Unknown time  Yes Yes   Sig: Take 2 capsules by mouth daily   NON FORMULARY   Yes No   Sig: Floradix Iron supplement   aspirin (ECOTRIN LOW STRENGTH) 81 mg EC tablet   No No   Sig: Take 1 tablet (81 mg total) by mouth daily   atorvastatin (LIPITOR) 40 mg tablet 2/28/2021 at Unknown time  No Yes   Sig: Take 1 tablet (40 mg total) by mouth every evening   b complex vitamins tablet 2/28/2021 at Unknown time  Yes Yes   Sig: Take 1 tablet by mouth daily   cholecalciferol (VITAMIN D3) 1,000 units tablet 2/28/2021 at Unknown time  Yes Yes   Sig: Take 1,000 Units by mouth daily   gabapentin (NEURONTIN) 100 mg capsule Not Taking at Unknown time  No No Sig: Take 1 capsule (100 mg total) by mouth 3 (three) times a day as needed (headache)   Patient not taking: Reported on 3/1/2021   rivaroxaban (XARELTO) 15 mg tablet 2021 at Unknown time  No Yes   Sig: Take 1 tablet (15 mg total) by mouth daily with breakfast for 21 days      Facility-Administered Medications: None       Past Medical History:   Diagnosis Date    Asthma     ACTIVITY INDUCED ASTHMA    IBS (irritable bowel syndrome)        Past Surgical History:   Procedure Laterality Date    ABDOMINAL SURGERY      LAPROSCOPY FOR ENDOMETRIOSIS    CERVICAL BIOPSY  W/ LOOP ELECTRODE EXCISION      FINGER SURGERY      LEFT MIDDLE FINGER SURGERY 5 YRS AGO    TUBAL LIGATION         Family History   Problem Relation Age of Onset    Rheum arthritis Mother     Diverticulitis Father    Ciara Mullet Gout Brother     Alzheimer's disease Maternal Grandmother     Transient ischemic attack Maternal Grandmother     Aneurysm Maternal Grandfather     Hypertension Paternal Grandmother     Dementia Paternal Grandmother     COPD Paternal Grandfather     Hypertension Paternal Grandfather     Emphysema Paternal Grandfather      I have reviewed and agree with the history as documented  E-Cigarette/Vaping    E-Cigarette Use Never User      E-Cigarette/Vaping Substances     Social History     Tobacco Use    Smoking status: Former Smoker     Quit date: 2/10/2000     Years since quittin 0    Smokeless tobacco: Never Used    Tobacco comment: NO SMOKING IN 19 YEARS   Substance Use Topics    Alcohol use: Not Currently    Drug use: No       Review of Systems   Constitutional: Positive for fatigue  Negative for fever  Gastrointestinal: Positive for nausea  Negative for blood in stool and vomiting  Genitourinary: Positive for vaginal bleeding  Neurological: Positive for syncope (near syncope) and weakness  All other systems reviewed and are negative        Physical Exam  Physical Exam  Vitals signs and nursing note reviewed  Exam conducted with a chaperone present (female ED RN Remy Morrison)  Constitutional:       General: She is in acute distress (moderate)  HENT:      Head: Normocephalic and atraumatic  Mouth/Throat:      Mouth: Mucous membranes are moist    Eyes:      General: No scleral icterus  Extraocular Movements: Extraocular movements intact  Pupils: Pupils are equal, round, and reactive to light  Comments: Pale conjunctiva  Neck:      Musculoskeletal: Normal range of motion and neck supple  Cardiovascular:      Rate and Rhythm: Regular rhythm  Tachycardia present  Heart sounds: Normal heart sounds  No murmur  Pulmonary:      Effort: Pulmonary effort is normal  No respiratory distress  Breath sounds: Normal breath sounds  No wheezing or rales  Abdominal:      General: Bowel sounds are normal  There is no distension  Palpations: Abdomen is soft  Tenderness: There is no abdominal tenderness  Genitourinary:     Vagina: No vaginal discharge  Comments: (+) mild to moderate vaginal bleeding from closed cervical os  No CMT  No adenxal masses or tenderness  No vaginal discharge  No lacerations noted  Musculoskeletal:         General: No deformity  Right lower leg: No edema  Left lower leg: No edema  Skin:     General: Skin is warm and dry  Coloration: Skin is pale (somewhat)  Findings: No erythema or rash  Neurological:      General: No focal deficit present  Mental Status: She is alert and oriented to person, place, and time  Psychiatric:      Comments: Somewhat anxious            Vital Signs  ED Triage Vitals [03/01/21 0025]   Temperature Pulse Respirations Blood Pressure SpO2   98 °F (36 7 °C) 102 16 125/68 100 %      Temp Source Heart Rate Source Patient Position - Orthostatic VS BP Location FiO2 (%)   Oral -- -- -- --      Pain Score       --           Vitals:    03/01/21 0025   BP: 125/68   Pulse: 102         Visual Acuity      ED Medications  Medications   sodium chloride 0 9 % infusion (125 mL/hr Intravenous New Bag 3/1/21 0126)   sodium chloride 0 9 % bolus 1,000 mL (0 mL Intravenous Stopped 3/1/21 0137)   ondansetron (ZOFRAN) injection 4 mg (4 mg Intravenous Given 3/1/21 0049)       Diagnostic Studies  Results Reviewed     Procedure Component Value Units Date/Time    Comprehensive metabolic panel [[id number]]  (Abnormal) Collected: 03/01/21 0034    Lab Status: Final result Specimen: Blood from Arm, Left Updated: 03/01/21 0118     Sodium 141 mmol/L      Potassium 3 5 mmol/L      Chloride 106 mmol/L      CO2 26 mmol/L      ANION GAP 9 mmol/L      BUN 3 mg/dL      Creatinine 0 93 mg/dL      Glucose 97 mg/dL      Calcium 8 2 mg/dL      Corrected Calcium 8 8 mg/dL      AST 16 U/L      ALT 26 U/L      Alkaline Phosphatase 44 U/L      Total Protein 6 3 g/dL      Albumin 3 2 g/dL      Total Bilirubin 0 18 mg/dL      eGFR 75 ml/min/1 73sq m     Narrative:      [place] guidelines for Chronic Kidney Disease (CKD):     Stage 1 with normal or high GFR (GFR > 90 mL/min/1 73 square meters)    Stage 2 Mild CKD (GFR = 60-89 mL/min/1 73 square meters)    Stage 3A Moderate CKD (GFR = 45-59 mL/min/1 73 square meters)    Stage 3B Moderate CKD (GFR = 30-44 mL/min/1 73 square meters)    Stage 4 Severe CKD (GFR = 15-29 mL/min/1 73 square meters)    Stage 5 End Stage CKD (GFR <15 mL/min/1 73 square meters)  Note: GFR calculation is accurate only with a steady state creatinine    TSH, 3rd generation with Free T4 reflex [[id number]]  (Abnormal) Collected: 03/01/21 0034    Lab Status: Final result Specimen: Blood from Arm, Left Updated: 03/01/21 0118     TSH 3RD GENERATON 5 320 uIU/mL     Narrative:      Patients undergoing fluorescein dye angiography may retain small amounts of fluorescein in the body for 48-72 hours post procedure  Samples containing fluorescein can produce falsely depressed TSH values   If the patient had this procedure,a specimen should be resubmitted post fluorescein clearance  hCG, qualitative pregnancy [[id number]]  (Normal) Collected: 03/01/21 0034    Lab Status: Final result Specimen: Blood from Arm, Left Updated: 03/01/21 0118     Preg, Serum Negative    T4, free [[id number]] Collected: 03/01/21 0034    Lab Status: In process Specimen: Blood from Arm, Left Updated: 03/01/21 0118    Troponin I [[id number]]  (Normal) Collected: 03/01/21 0034    Lab Status: Final result Specimen: Blood from Arm, Left Updated: 03/01/21 0104     Troponin I <0 02 ng/mL     Protime-INR [[id number]]  (Abnormal) Collected: 03/01/21 0034    Lab Status: Final result Specimen: Blood from Arm, Left Updated: 03/01/21 0057     Protime 14 7 seconds      INR 1 14    APTT [[id number]]  (Normal) Collected: 03/01/21 0034    Lab Status: Final result Specimen: Blood from Arm, Left Updated: 03/01/21 0057     PTT 28 seconds     CBC and differential [[id number]]  (Abnormal) Collected: 03/01/21 0034    Lab Status: Final result Specimen: Blood from Arm, Left Updated: 03/01/21 0045     WBC 8 91 Thousand/uL      RBC 3 25 Million/uL      Hemoglobin 8 5 g/dL      Hematocrit 27 2 %      MCV 84 fL      MCH 26 2 pg      MCHC 31 3 g/dL      RDW 15 4 %      MPV 11 1 fL      Platelets 833 Thousands/uL      nRBC 0 /100 WBCs      Neutrophils Relative 51 %      Immat GRANS % 0 %      Lymphocytes Relative 36 %      Monocytes Relative 10 %      Eosinophils Relative 2 %      Basophils Relative 1 %      Neutrophils Absolute 4 56 Thousands/µL      Immature Grans Absolute 0 01 Thousand/uL      Lymphocytes Absolute 3 19 Thousands/µL      Monocytes Absolute 0 90 Thousand/µL      Eosinophils Absolute 0 19 Thousand/µL      Basophils Absolute 0 06 Thousands/µL                  XR chest 1 view portable   ED Interpretation by Tanner Wheatley MD (03/01 0045)   No acute disease read by me                    Procedures  ECG 12 Lead Documentation Only    Date/Time: 3/1/2021 12:51 AM  Performed by: Carolann Martinez MD  Authorized by: Carolann Martinez MD     Indications / Diagnosis:  Near syncope  ECG reviewed by me, the ED Provider: yes    Patient location:  ED  Previous ECG:     Previous ECG:  Compared to current    Comparison ECG info:  12/31/20    Similarity:  No change  Rate:     ECG rate:  99    ECG rate assessment: normal    Rhythm:     Rhythm: sinus rhythm    Ectopy:     Ectopy: none    QRS:     QRS axis:  Normal  Conduction:     Conduction: abnormal      Abnormal conduction: incomplete RBBB    ST segments:     ST segments:  Non-specific  T waves:     T waves: non-specific    Other findings:     Other findings: poor R wave progression    Comments:      I do not agree with computer reading of cannot rule out anterior infarct, age undetermined  ED Course  ED Course as of Mar 01 0216   Mon Mar 01, 2021   0046 CXR and CBC d/w patient and daughter with patient's permission  9915 Blood consent form signed by patient   26 D/w gyn resident who will see patient in ED and blood ordered for transfusion  Will hold off on TXA infusion for now   0125 Rest of labs d/w patient and sister  0201 D/w gyn resident and patient can be discharged after blood transfusion if stable but patient more comfortable with admission for observation                   HEART Risk Score      Most Recent Value   Heart Score Risk Calculator   History  0 Filed at: 03/01/2021 0105   ECG  1 Filed at: 03/01/2021 0105   Age  0 Filed at: 03/01/2021 0105   Risk Factors  1 Filed at: 03/01/2021 0105   Troponin  0 Filed at: 03/01/2021 0105   HEART Score  2 Filed at: 03/01/2021 0105                                    MDM  Number of Diagnoses or Management Options  Diagnosis management comments: Differential diagnosis including but not limited to: Near syncope, cardiac arrhythmia, MI, ACS, doubt PE since patient is on xarelto, metabolic abnormality, doubt intracranial process or seizure, anemia, GI bleed, dehydration  DDx including but not limited to: menorrhagia, ectopic pregnancy, threatened , missed , incomplete , anemia, coagulopathy, DUB, tumor, retained products of conception, PCOS; doubt ovarian torsion or ruptured ovarian cyst         Amount and/or Complexity of Data Reviewed  Clinical lab tests: ordered and reviewed  Tests in the radiology section of CPT®: ordered and reviewed  Decide to obtain previous medical records or to obtain history from someone other than the patient: yes  Obtain history from someone other than the patient: yes  Review and summarize past medical records: yes  Discuss the patient with other providers: yes  Independent visualization of images, tracings, or specimens: yes        Disposition  Final diagnoses:   Menorrhagia   Anemia   Anticoagulated by anticoagulation treatment   Near syncope   Raised TSH level     Time reflects when diagnosis was documented in both MDM as applicable and the Disposition within this note     Time User Action Codes Description Comment    3/1/2021  1:24 AM Elio Julissa Add [N92 0] Menorrhagia     3/1/2021  1:25 AM Elio Julissa Add [D64 9] Anemia     3/1/2021  1:25 AM Elio Julissa Add [Z79 01] Anticoagulated by anticoagulation treatment     3/1/2021  1:25 AM Elio Julissa Add [R55] Near syncope     3/1/2021  1:25 AM Elio Julissa Add [R79 89] Raised TSH level     3/1/2021  1:25 AM Elio Julissa Modify [N92 0] Menorrhagia     3/1/2021  1:25 AM Elio Julissa Modify [R55] Near syncope       ED Disposition     ED Disposition Condition Date/Time Comment    Admit Stable Mon Mar 1, 2021  2:15 AM Case was discussed with [facility] resident and the patient's admission status was agreed to be Admission Status: observation status to the service of Dr Zahra Gil           Follow-up Information    None         Patient's Medications   Discharge Prescriptions    No medications on file     No discharge procedures on file      PDMP Review       Value Time User    PDMP Reviewed  Yes 3/1/2021 12:27 AM Maureen Galindo MD          ED Provider  Electronically Signed by           Maureen Galindo MD  03/01/21 0977

## 2021-03-02 ENCOUNTER — APPOINTMENT (OUTPATIENT)
Dept: PHYSICAL THERAPY | Facility: CLINIC | Age: 46
End: 2021-03-02
Payer: COMMERCIAL

## 2021-03-02 ENCOUNTER — APPOINTMENT (OUTPATIENT)
Dept: OCCUPATIONAL THERAPY | Facility: CLINIC | Age: 46
End: 2021-03-02
Payer: COMMERCIAL

## 2021-03-02 LAB
ANION GAP SERPL CALCULATED.3IONS-SCNC: 5 MMOL/L (ref 4–13)
BASOPHILS # BLD AUTO: 0.05 THOUSANDS/ΜL (ref 0–0.1)
BASOPHILS NFR BLD AUTO: 1 % (ref 0–1)
BUN SERPL-MCNC: 6 MG/DL (ref 5–25)
CALCIUM SERPL-MCNC: 7.9 MG/DL (ref 8.3–10.1)
CHLORIDE SERPL-SCNC: 109 MMOL/L (ref 100–108)
CO2 SERPL-SCNC: 26 MMOL/L (ref 21–32)
CREAT SERPL-MCNC: 0.84 MG/DL (ref 0.6–1.3)
EOSINOPHIL # BLD AUTO: 0.15 THOUSAND/ΜL (ref 0–0.61)
EOSINOPHIL NFR BLD AUTO: 2 % (ref 0–6)
ERYTHROCYTE [DISTWIDTH] IN BLOOD BY AUTOMATED COUNT: 15.7 % (ref 11.6–15.1)
GFR SERPL CREATININE-BSD FRML MDRD: 85 ML/MIN/1.73SQ M
GLUCOSE SERPL-MCNC: 91 MG/DL (ref 65–140)
HCT VFR BLD AUTO: 25.5 % (ref 34.8–46.1)
HCT VFR BLD AUTO: 30 % (ref 34.8–46.1)
HGB BLD-MCNC: 7.9 G/DL (ref 11.5–15.4)
HGB BLD-MCNC: 9.4 G/DL (ref 11.5–15.4)
IMM GRANULOCYTES # BLD AUTO: 0.02 THOUSAND/UL (ref 0–0.2)
IMM GRANULOCYTES NFR BLD AUTO: 0 % (ref 0–2)
LYMPHOCYTES # BLD AUTO: 2.1 THOUSANDS/ΜL (ref 0.6–4.47)
LYMPHOCYTES NFR BLD AUTO: 34 % (ref 14–44)
MCH RBC QN AUTO: 26 PG (ref 26.8–34.3)
MCHC RBC AUTO-ENTMCNC: 31 G/DL (ref 31.4–37.4)
MCV RBC AUTO: 84 FL (ref 82–98)
MONOCYTES # BLD AUTO: 0.67 THOUSAND/ΜL (ref 0.17–1.22)
MONOCYTES NFR BLD AUTO: 11 % (ref 4–12)
NEUTROPHILS # BLD AUTO: 3.27 THOUSANDS/ΜL (ref 1.85–7.62)
NEUTS SEG NFR BLD AUTO: 52 % (ref 43–75)
NRBC BLD AUTO-RTO: 0 /100 WBCS
PLATELET # BLD AUTO: 253 THOUSANDS/UL (ref 149–390)
PMV BLD AUTO: 11 FL (ref 8.9–12.7)
POTASSIUM SERPL-SCNC: 3.8 MMOL/L (ref 3.5–5.3)
RBC # BLD AUTO: 3.04 MILLION/UL (ref 3.81–5.12)
SODIUM SERPL-SCNC: 140 MMOL/L (ref 136–145)
WBC # BLD AUTO: 6.26 THOUSAND/UL (ref 4.31–10.16)

## 2021-03-02 PROCEDURE — 85025 COMPLETE CBC W/AUTO DIFF WBC: CPT | Performed by: FAMILY MEDICINE

## 2021-03-02 PROCEDURE — P9040 RBC LEUKOREDUCED IRRADIATED: HCPCS

## 2021-03-02 PROCEDURE — NC001 PR NO CHARGE: Performed by: PHYSICIAN ASSISTANT

## 2021-03-02 PROCEDURE — 85018 HEMOGLOBIN: CPT | Performed by: FAMILY MEDICINE

## 2021-03-02 PROCEDURE — 36415 COLL VENOUS BLD VENIPUNCTURE: CPT | Performed by: FAMILY MEDICINE

## 2021-03-02 PROCEDURE — 80048 BASIC METABOLIC PNL TOTAL CA: CPT | Performed by: FAMILY MEDICINE

## 2021-03-02 PROCEDURE — 85014 HEMATOCRIT: CPT | Performed by: FAMILY MEDICINE

## 2021-03-02 PROCEDURE — 99225 PR SBSQ OBSERVATION CARE/DAY 25 MINUTES: CPT | Performed by: FAMILY MEDICINE

## 2021-03-02 PROCEDURE — 99214 OFFICE O/P EST MOD 30 MIN: CPT | Performed by: PHYSICIAN ASSISTANT

## 2021-03-02 RX ADMIN — MEDROXYPROGESTERONE ACETATE 5 MG: 2.5 TABLET ORAL at 08:12

## 2021-03-02 RX ADMIN — ASPIRIN 81 MG: 81 TABLET, COATED ORAL at 08:10

## 2021-03-02 RX ADMIN — Medication 500 MG: at 08:12

## 2021-03-02 RX ADMIN — SODIUM CHLORIDE 125 ML/HR: 0.9 INJECTION, SOLUTION INTRAVENOUS at 05:24

## 2021-03-02 RX ADMIN — RIVAROXABAN 15 MG: 15 TABLET, FILM COATED ORAL at 08:10

## 2021-03-02 RX ADMIN — ATORVASTATIN CALCIUM 40 MG: 40 TABLET, FILM COATED ORAL at 17:34

## 2021-03-02 RX ADMIN — SODIUM CHLORIDE 125 ML/HR: 0.9 INJECTION, SOLUTION INTRAVENOUS at 21:52

## 2021-03-02 NOTE — ED NOTES
PT found sitting on bedside commode  Pt had small formed stool and small blood clot noted in urine-red  Pt repositioned self back on edge of stretcher with negative complications  Steady gait noted  Pt ordered breakfast-requested to take "all my medicine with my breakfast or I'll get sick"  Pt ordered breakfast tray  Pt provided with personal hygiene items-performed self washing while sitting on edge of stretcher  Fresh linen and gown provided  Will continue to monitor       Ritu Hernandez RN  03/02/21 [id number]

## 2021-03-02 NOTE — QUICK NOTE
Patient seen and evaluated this morning by [facility] team  Patient reporting improvement in bleeding, stating only about 1/2 pad was bloody this morning  Patient stating the day prior had multiple lemon-seized clots, which she states has no resolved  Patient is having some lightheadedness this AM but no SOB or further syncopal episodes overnight  Of note, patient repeat H/H s/p 1 unit of PRBC was 8 5 ->9 3  The decision was made to continue to monitor patient and hold off on second transfusion  Per ob/gyn recommendations patient was started on Provera (progesterone only) as combination OCP would be contraindicated given hx of CVA (12/20)  Patient did question whether an individual/family friend may come and donate blood specifically while admitted, however, per conversation with blood bank this is not possible    Patient also reporting that she had upcoming CTA head neck w wo contrast 2/2 internal carotid dissection and subsequent CVA  Will obtain this imaging at this time

## 2021-03-02 NOTE — ASSESSMENT & PLAN NOTE
Presenting with 8 days of menorrhagia  Now presenting with dizziness and pre-syncope  On Xarelto since December for carotid dissection  Heavy menstrual periods since then Hemoglobin 8 5 on admission s/p 1 unit RBCs in the ED with improvement of Hemoglobin to 9 3  Patient with continued clotting and bleeding overnight   This am Hg at 7 9 down from 8 9 at 9pm     Plan:  · Transfuse 2nd unit of PRBC  · Monitor H&H after transfusion, 2pm H&H ordered  · OB/GYN consulted- per recs patient started on Provera 5mg x5- 10 days  · Recommend outpatient follow up with [facility] for long term options  · Continue Xarelto

## 2021-03-02 NOTE — ASSESSMENT & PLAN NOTE
MRI 12/31/2020: Right frontal focus of cortical acute to subacute ischemia    Plan  · Inpatient consult to Neurology  · Followed by Neurology as an outpatient (Dr Aponte)  · Continue aspirin and Xarelto

## 2021-03-02 NOTE — ED NOTES
Spoke to Cristian in blood bank regarding sending up ordered 2nd unit of RBC     Arcelia Holguin RN  03/02/21 8256

## 2021-03-02 NOTE — PLAN OF CARE
Problem: Potential for Falls  Goal: Patient will remain free of falls  Description: INTERVENTIONS:  - Assess patient frequently for physical needs  -  Identify cognitive and physical deficits and behaviors that affect risk of falls  -  [place] fall precautions as indicated by assessment   - Educate patient/family on patient safety including physical limitations  - Instruct patient to call for assistance with activity based on assessment  - Modify environment to reduce risk of injury  - Consider OT/PT consult to assist with strengthening/mobility  Outcome: Progressing     Problem: SAFETY ADULT  Goal: Patient will remain free of falls  Description: INTERVENTIONS:  - Assess patient frequently for physical needs  -  Identify cognitive and physical deficits and behaviors that affect risk of falls    -  [place] fall precautions as indicated by assessment   - Educate patient/family on patient safety including physical limitations  - Instruct patient to call for assistance with activity based on assessment  - Modify environment to reduce risk of injury  - Consider OT/PT consult to assist with strengthening/mobility  Outcome: Progressing  Goal: Maintain or return to baseline ADL function  Description: INTERVENTIONS:  -  Assess patient's ability to carry out ADLs; assess patient's baseline for ADL function and identify physical deficits which impact ability to perform ADLs (bathing, care of mouth/teeth, toileting, grooming, dressing, etc )  - Assess/evaluate cause of self-care deficits   - Assess range of motion  - Assess patient's mobility; develop plan if impaired  - Assess patient's need for assistive devices and provide as appropriate  - Encourage maximum independence but intervene and supervise when necessary  - Involve family in performance of ADLs  - Assess for home care needs following discharge   - Consider OT consult to assist with ADL evaluation and planning for discharge  - Provide patient education as appropriate  Outcome: Progressing  Goal: Maintain or return mobility status to optimal level  Description: INTERVENTIONS:  - Assess patient's baseline mobility status (ambulation, transfers, stairs, etc )    - Identify cognitive and physical deficits and behaviors that affect mobility  - Identify mobility aids required to assist with transfers and/or ambulation (gait belt, sit-to-stand, lift, walker, cane, etc )  - [place] fall precautions as indicated by assessment  - Record patient progress and toleration of activity level on Mobility SBAR; progress patient to next Phase/Stage  - Instruct patient to call for assistance with activity based on assessment  - Consider rehabilitation consult to assist with strengthening/weightbearing, etc   Outcome: Progressing     Problem: DISCHARGE PLANNING  Goal: Discharge to home or other facility with appropriate resources  Description: INTERVENTIONS:  - Identify barriers to discharge w/patient and caregiver  - Arrange for needed discharge resources and transportation as appropriate  - Identify discharge learning needs (meds, wound care, etc )  - Arrange for interpretive services to assist at discharge as needed  - Refer to Case Management Department for coordinating discharge planning if the patient needs post-hospital services based on physician/advanced practitioner order or complex needs related to functional status, cognitive ability, or social support system  Outcome: Progressing     Problem: HEMATOLOGIC - ADULT  Goal: Maintains hematologic stability  Description: INTERVENTIONS  - Assess for signs and symptoms of bleeding or hemorrhage  - Monitor labs  - Administer supportive blood products/factors as ordered and appropriate  Outcome: Progressing

## 2021-03-02 NOTE — ASSESSMENT & PLAN NOTE
Presenting with 8 days of menorrhagia  Now presenting with dizziness and pre-syncope  On Xarelto since December for carotid dissection  Heavy menstrual periods since then Hemoglobin 8 5 on admission s/p 1 unit RBCs in the ED with improvement of Hemoglobin to 9 3  Patient with continued clotting and bleeding overnight  Hg stable this am at 9      Plan:  · Hg stable s/p 2 units of PRBC since admission  · Repeat 2pm, stable at 9 4  · OB/GYN consulted- per recs continue Provera 5mg on day 4/5  · Discussed with Neurosurgery patient cleared to discontinue Xarelto   · Recommend outpatient follow up with [facility] for long term options

## 2021-03-02 NOTE — ASSESSMENT & PLAN NOTE
· Provera x5 days   · Outpatient [facility] O[facility] follow up   · Appreciate inpatient management and recommendations at this time

## 2021-03-02 NOTE — PROGRESS NOTES
Progress Note Winnie Corbett 1975, 41 y o  female MRN: [id number]    Unit/Bed#: ED 27 Encounter: [id number]    Primary Care Provider: Stephanie Fenton MD   Date and time admitted to hospital: 3/1/2021 12:22 AM        * Vaginal bleeding  Assessment & Plan  Presenting with 8 days of menorrhagia  Now presenting with dizziness and pre-syncope  On Xarelto since December for carotid dissection  Heavy menstrual periods since then Hemoglobin 8 5 on admission s/p 1 unit RBCs in the ED with improvement of Hemoglobin to 9 3  Patient with continued clotting and bleeding overnight  This am Hg at 7 9 down from 8 9 at 9pm     Plan:  · Transfuse 2nd unit of PRBC  · Monitor H&H after transfusion, 2pm H&H ordered  · Consider continued transfusion < 8 5, as patient has remained symptomatic (baseline Hg 10-11)  · OB/GYN consulted- per recs patient started on Provera 5mg x5- 10 days  · Recommend outpatient follow up with [facility] for long term options  · Continue Xarelto    Ischemic stroke [facility])  Assessment & Plan  MRI 12/31/2020: Right frontal focus of cortical acute to subacute ischemia    Plan  · Inpatient consult to Neurology  · Followed by Neurology as an outpatient (Dr Aponte)  · Continue aspirin and Xarelto    Anemia  Assessment & Plan  2 year history of iron deficiency anemia now presenting with acute blood loss anemia in the setting of menorrhagia and Xarelto  Plan:  · Continue home Floradix ( iron and herb liquid supplement)  · Reports intolerance to iron tablets 2/2 constipation    Internal carotid artery dissection [facility])  Assessment & Plan  Presented for admission on 12/26/2020 with a persistent headache which began after chiropractic manipulation of her neck on 12/20/2020  CTA head and neck: "High-grade stenosis terminating in occlusion at the C1 level of the right cervical ICA suggesting dissection   There is decreased flow distally in the right cervical and right cavernous ICA possibly from retrograde flow " She was seen by Neurosurgery and Vascular Surgery with no acute interventions recommended  3/1/21- CTA head and neck showing CT angiography: Resolution of the previously identified right internal carotid artery dissection  The vessel is now normal in caliber with no luminal stenosis, intramural hematoma or dissection flap      Other findings: 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 the nasopharynx on series 5 image 124  If not already performed, ENT consultation and direct visual inspection recommended  Plan  · Continue Xarelto and aspirin 81 mg daily  · Inpatient consult to Neurology to discuss continued need for Xarelto as likely a precipitating factor in continued menorrhagia  · PT/OT as an outpatient due to balance and coordination issues        VTE Pharmacologic Prophylaxis:   Pharmacologic: Rivaroxaban (Xarelto)  Mechanical VTE Prophylaxis in Place: Yes    Discussions with Specialists or Other Care Team Provider: Neurology    Education and Discussions with Family / Patient: Discussed plan of care with patient and is in agreement    Current Length of Stay: 0 day(s)    Current Patient Status: Observation     Discharge Plan / Estimated Discharge Date: 24-48 hours    Code Status: Level 1 - Full Code      Subjective:   Patient reports continued fatigue, and increased passage of blood clots overnight  Objective:     Vitals:   Temp (24hrs), Av 6 °F (37 °C), Min:98 4 °F (36 9 °C), Max:98 9 °F (37 2 °C)    Temp:  [98 4 °F (36 9 °C)-98 9 °F (37 2 °C)] 98 4 °F (36 9 °C)  HR:  [65-91] 77  Resp:  [16-18] 18  BP: ()/(59-70) 111/64  SpO2:  [98 %-100 %] 99 %  Body mass index is 27 87 kg/m²  Input and Output Summary (last 24 hours):        Intake/Output Summary (Last 24 hours) at 3/2/2021 1207  Last data filed at 3/2/2021 0540  Gross per 24 hour   Intake 3170 83 ml   Output 500 ml   Net 2670 83 ml       Physical Exam:     Physical Exam  Constitutional:       Appearance: She is well-developed  HENT:      Head: Normocephalic and atraumatic  Nose: Nose normal    Eyes:      Conjunctiva/sclera: Conjunctivae normal    Neck:      Musculoskeletal: Normal range of motion and neck supple  Cardiovascular:      Rate and Rhythm: Normal rate and regular rhythm  Heart sounds: Normal heart sounds  Pulmonary:      Effort: Pulmonary effort is normal       Breath sounds: Normal breath sounds  Abdominal:      General: Bowel sounds are normal       Palpations: Abdomen is soft  Musculoskeletal: Normal range of motion  Skin:     General: Skin is warm  Coloration: Skin is pale  Neurological:      Mental Status: She is alert and oriented to person, place, and time  Additional Data:     Labs:    Results from last 7 days   Lab Units 03/02/21  0517   WBC Thousand/uL 6 26   HEMOGLOBIN g/dL 7 9*   HEMATOCRIT % 25 5*   PLATELETS Thousands/uL 253   NEUTROS PCT % 52   LYMPHS PCT % 34   MONOS PCT % 11   EOS PCT % 2     Results from last 7 days   Lab Units 03/02/21  0517  03/01/21  0034   POTASSIUM mmol/L 3 8   < > 3 5   CHLORIDE mmol/L 109*   < > 106   CO2 mmol/L 26   < > 26   BUN mg/dL 6   < > 3*   CREATININE mg/dL 0 84   < > 0 93   CALCIUM mg/dL 7 9*   < > 8 2*   ALK PHOS U/L  --   --  44*   ALT U/L  --   --  26   AST U/L  --   --  16    < > = values in this interval not displayed  Results from last 7 days   Lab Units 03/01/21  0034   INR  1 14       * I Have Reviewed All Lab Data Listed Above  * Additional Pertinent Lab Tests Reviewed:  All Labs Within Last 24 Hours Reviewed    Imaging:    Imaging Reports Reviewed Today Include: CTA head and neck  Imaging Personally Reviewed by Myself Includes:  As above    Recent Cultures (last 7 days):           Last 24 Hours Medication List:   Current Facility-Administered Medications   Medication Dose Route Frequency Provider Last Rate    aspirin  81 mg Oral Daily Shaheen Murphy DO      atorvastatin  40 mg Oral QPM Damien Knee, DO      magnesium gluconate  500 mg Oral Daily Damien Knee, DO      medroxyPROGESTERone  5 mg Oral Daily Damien Knee, DO      rivaroxaban  15 mg Oral Daily With Breakfast Damien Knee, DO      sodium chloride  125 mL/hr Intravenous Continuous Olivia Summers  mL/hr (03/02/21 0510)        Today, Patient Was Seen By: Eder Brenner MD    ** Please Note: This note has been constructed using a voice recognition system   **

## 2021-03-02 NOTE — ASSESSMENT & PLAN NOTE
· Presented with excessive vaginal bleeding  · H/o R carotid artery dissection from neck manipulation  · On Xarelto and ASA 81mg for previous dissection and frontal stroke  Imaging:   · CTA 3/1/2021: Resolution of previously identified right carotid artery dissection  Vessel is now normal in caliber without limunal thrombus, hematoma, or dissection flap  Plan:   · At this time patient is cleared to stop Xarelto and ASA  · Would recommend patient resume ASA 81mg when it is medically appropriate  · No further acute neurosurgical needs at this time  · Will continue to follow as needed   · Ongoing medical management per primary team    · Continue to follow up outpatient as scheduled  · Call with questions and concerns

## 2021-03-02 NOTE — ED NOTES
Patient transported to room 405 and placed on telemetry, transmission to central station confirmed   Receiving RN present in room upon arrival     Elsa  03/02/21 1516

## 2021-03-02 NOTE — TELEMEDICINE
On-Call Telephone Note    Contacted by Rosy Ely via [facility] text  Unruly Andrade 41 y o  female MRN: [id number]  Unit/Bed#: ED 32 Encounter: [id number]    Per provider report, patient presents with menorrhagia  Prior CVA from right internal carotid dissection  Per report patient's hemoglobin continues to drop even with transfusions  Available past medical history,social history, surgical history, medication list, drug allergies and review of systems were reviewed  /64   Pulse 77   Temp 98 4 °F (36 9 °C) (Oral)   Resp 18   Ht 5' 4 5" (1 638 m)   Wt 74 8 kg (164 lb 14 5 oz)   LMP 02/22/2021   SpO2 99%   BMI 27 87 kg/m²      Imaging personally reviewed  And reviewed with Dr Joy Bryan  CTA demonstrates improvement in right internal carotid dissection  History of right carotid dissection likely secondary to neck manipulation, with small punctate stroke    Assessment and Plan  1  Continue neurological exams  2  Okay to DC Xarelto and aspirin at this time, once patient is stable, restart aspirin 81 mg daily  3   Formal consult to follow  4  Contact Neurosurgery with any further questions or concerns  All questions answered  Provider is in agreement with the course of action  A total of 15 minutes was spent discussing the presentation, physical exam and formulating a management plan with the provider

## 2021-03-02 NOTE — ASSESSMENT & PLAN NOTE
MRI 12/31/2020: Right frontal focus of cortical acute to subacute ischemia    Plan  · Neurosurgery consulted patient cleared to stop Xarelto  · Continue Aspirin  · Followed by Neurology as an outpatient (Dr Aponte)

## 2021-03-02 NOTE — ASSESSMENT & PLAN NOTE
Presented for admission on 12/26/2020 with a persistent headache which began after chiropractic manipulation of her neck on 12/20/2020  CTA head and neck: "High-grade stenosis terminating in occlusion at the C1 level of the right cervical ICA suggesting dissection  There is decreased flow distally in the right cervical and right cavernous ICA possibly from retrograde flow " She was seen by Neurosurgery and Vascular Surgery with no acute interventions recommended  3/1/21- CTA head and neck showing CT angiography: Resolution of the previously identified right internal carotid artery dissection  The vessel is now normal in caliber with no luminal stenosis, intramural hematoma or dissection flap      Other findings: 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

## 2021-03-02 NOTE — CONSULTS
Consult- Unruly Andrade 1975, 41 y o  female MRN: [id number]    Unit/Bed#: S -64 Encounter: [id number]    Primary Care Provider: Julián Johnson MD   Date and time admitted to hospital: 3/1/2021 12:22 AM    Inpatient consult to Neurosurgery  Consult performed by: April Palma PA-C  Consult ordered by: April Palma PA-C        Internal carotid artery dissection [facility])  Assessment & Plan  · Presented with excessive vaginal bleeding  · H/o R carotid artery dissection from neck manipulation  · On Xarelto and ASA 81mg for previous dissection and frontal stroke  Imaging:   · CTA 3/1/2021: Resolution of previously identified right carotid artery dissection  Vessel is now normal in caliber without limunal thrombus, hematoma, or dissection flap  Plan:   · At this time patient is cleared to stop Xarelto and ASA  · Would recommend patient resume ASA 81mg when it is medically appropriate  · No further acute neurosurgical needs at this time  · Will continue to follow as needed   · Ongoing medical management per primary team    · Continue to follow up outpatient as scheduled  · Call with questions and concerns  Ischemic stroke [facility])  Assessment & Plan  · Found on MRI brain secondary to carotid dissection  · Was on ASA 81mg and Xarelto  · Cleared to hold both medication at this time with plans to restart patient on ASA 81mg when medically appropriate  Anemia  Assessment & Plan  · S/p units of blood  · Hemoglobin 9 4 this morning  · Recommend continuing to trend for stability  * Vaginal bleeding  Assessment & Plan  · Provera x5 days   · Outpatient [facility] follow up   · Appreciate inpatient management and recommendations at this time    History of Present Illness   HPI: Unruly Andrade is a 41 y o  female with PMH including IBS, asthma, stroke, carotid dissection who presents with complaint of heavy vaginal bleeding   Known to the neurosurgical service after suffering a right carotid dissection the resulted in near occlusion of the vessel in December 2020 after manipulation  Secondary to the carotid dissection she was found to have a small stroke  At that time she was initiated on Xarelto and aspirin 81 mg  She was ongoing outpatient follow-up until recently when she had a persistent heavy menstruation  She was complaining of some lightheadedness  She was having menstrual bleeding for 8 days which was longer than normal which prompted her to present for evaluation  She received 2 units of red blood cells yesterday and states today she is feeling a little bit better  She continues to admit of fatigue and generalized weakness related to inactivity  She denies any headaches, change in vision, trouble speech, facial weakness, changes in sensation, weakness in arms or legs, dizziness or lightheaded presently  Discussed plan with patient in terms of stopping her AC/AP medication  Patient was appropriately nervous given her history of complications  Reviewed imaging personally with patient as well  Review of Systems   Constitutional: Positive for fatigue  Negative for activity change, appetite change and fever  HENT: Negative for hearing loss, trouble swallowing and voice change  Eyes: Negative for photophobia and visual disturbance  Respiratory: Negative for cough, chest tightness and shortness of breath  Cardiovascular: Negative for chest pain and leg swelling  Gastrointestinal: Negative for abdominal pain, constipation, diarrhea, nausea and vomiting  Genitourinary: Positive for vaginal bleeding  Negative for difficulty urinating  Musculoskeletal: Negative for back pain, gait problem, neck pain and neck stiffness  Skin: Negative for rash and wound  Neurological: Positive for light-headedness  Negative for dizziness, tremors, weakness and numbness  Psychiatric/Behavioral: Negative for agitation, behavioral problems and confusion         Historical Information   Past Medical History:   Diagnosis Date    Asthma     ACTIVITY INDUCED ASTHMA    IBS (irritable bowel syndrome)      Past Surgical History:   Procedure Laterality Date    ABDOMINAL SURGERY      LAPROSCOPY FOR ENDOMETRIOSIS    CERVICAL BIOPSY  W/ LOOP ELECTRODE EXCISION      FINGER SURGERY      LEFT MIDDLE FINGER SURGERY 5 YRS AGO    TUBAL LIGATION       Social History     Substance and Sexual Activity   Alcohol Use Not Currently     Social History     Substance and Sexual Activity   Drug Use No     Social History     Tobacco Use   Smoking Status Former Smoker    Quit date: 2/10/2000    Years since quittin 0   Smokeless Tobacco Never Used   Tobacco Comment    NO SMOKING IN 23 YEARS     Family History   Problem Relation Age of Onset    Rheum arthritis Mother     Diverticulitis Father     Gout Brother     Alzheimer's disease Maternal Grandmother     Transient ischemic attack Maternal Grandmother     Aneurysm Maternal Grandfather     Hypertension Paternal Grandmother     Dementia Paternal Grandmother     COPD Paternal Grandfather     Hypertension Paternal Grandfather     Emphysema Paternal Grandfather        Meds/Allergies   all current active meds have been reviewed, current meds:   Current Facility-Administered Medications   Medication Dose Route Frequency    aspirin (ECOTRIN LOW STRENGTH) EC tablet 81 mg  81 mg Oral Daily    atorvastatin (LIPITOR) tablet 40 mg  40 mg Oral QPM    magnesium gluconate (MAGONATE) tablet 500 mg  500 mg Oral Daily    medroxyPROGESTERone (PROVERA) tablet 5 mg  5 mg Oral Daily    rivaroxaban (XARELTO) tablet 15 mg  15 mg Oral Daily With Breakfast    sodium chloride 0 9 % infusion  125 mL/hr Intravenous Continuous    and PTA meds:   Prior to Admission Medications   Prescriptions Last Dose Informant Patient Reported? Taking?    CHLOROPHYLL PO Not Taking at Unknown time  Yes No   Sig: Take 1 Dose by mouth daily   Cholecalciferol 50 MCG (2000) CAPS Not Taking at Unknown time  Yes No   Sig: Take 1 capsule by mouth daily   Lysine 500 MG CAPS Past Week at Unknown time  Yes Yes   Sig: Take by mouth daily   Magnesium 250 MG TABS Past Week at Unknown time  Yes Yes   Sig: Take 2 capsules by mouth daily    NON FORMULARY Past Week at Unknown time  Yes Yes   Sig: Floradix Iron supplement   aspirin (ECOTRIN LOW STRENGTH) 81 mg EC tablet   No No   Sig: Take 1 tablet (81 mg total) by mouth daily   atorvastatin (LIPITOR) 40 mg tablet 2/28/2021 at Unknown time  No Yes   Sig: Take 1 tablet (40 mg total) by mouth every evening   b complex vitamins tablet Past Week at Unknown time  Yes Yes   Sig: Take 1 tablet by mouth daily   cholecalciferol (VITAMIN D3) 1,000 units tablet 2/28/2021 at Unknown time  Yes Yes   Sig: Take 1,000 Units by mouth daily   gabapentin (NEURONTIN) 100 mg capsule Not Taking at Unknown time  No No   Sig: Take 1 capsule (100 mg total) by mouth 3 (three) times a day as needed (headache)   Patient not taking: Reported on 3/1/2021   rivaroxaban (XARELTO) 15 mg tablet 2/28/2021 at Unknown time  No Yes   Sig: Take 1 tablet (15 mg total) by mouth daily with breakfast for 21 days      Facility-Administered Medications: None     Allergies   Allergen Reactions    Levofloxacin Other (See Comments) and Hives     "nervous system reaction, anxiety for 2 months"   Metronidazole Hives    Nuts Other (See Comments)     Allergy showed up on skin test    Sulfa Antibiotics     Latex Rash     The adhesive bothers her more         Objective   I/O       02/28 0701 - 03/01 0700 03/01 0701 - 03/02 0700 03/02 0701 - 03/03 0700    I V  (mL/kg)  3170 8 (42 4)     Blood 350      IV Piggyback 1000      Total Intake(mL/kg) 1350 (18) 3170 8 (42 4)     Urine (mL/kg/hr) 320 1020 (0 6) 400 (0 6)    Total Output 320 1020 400    Net +1030 +2150 8 -400                 Physical Exam  Constitutional:       Appearance: Normal appearance  She is well-developed     HENT:      Head: Normocephalic and atraumatic  Eyes:      Extraocular Movements: Extraocular movements intact and EOM normal       Conjunctiva/sclera: Conjunctivae normal       Pupils: Pupils are equal, round, and reactive to light  Neck:      Musculoskeletal: Normal range of motion and neck supple  Vascular: No JVD  Trachea: No tracheal deviation  Cardiovascular:      Rate and Rhythm: Normal rate  Pulmonary:      Effort: Pulmonary effort is normal  No respiratory distress  Abdominal:      General: There is no distension  Palpations: Abdomen is soft  Musculoskeletal: Normal range of motion  General: No tenderness or deformity  Skin:     General: Skin is warm and dry  Neurological:      Mental Status: She is alert and oriented to person, place, and time  Cranial Nerves: No cranial nerve deficit  Sensory: No sensory deficit  Motor: No weakness  Gait: Gait is intact  Deep Tendon Reflexes: Reflexes are normal and symmetric  Reflex Scores:       Bicep reflexes are 2+ on the right side and 2+ on the left side  Brachioradialis reflexes are 2+ on the right side and 2+ on the left side  Patellar reflexes are 2+ on the right side and 2+ on the left side  Psychiatric:         Speech: Speech normal          Behavior: Behavior normal          Thought Content: Thought content normal        Neurologic Exam     Mental Status   Oriented to person, place, and time  Attention: normal    Speech: speech is normal   Level of consciousness: alert  Knowledge: good  Normal comprehension  Cranial Nerves     CN III, IV, VI   Pupils are equal, round, and reactive to light  Extraocular motions are normal    Right pupil: Size: 3 mm  Shape: regular  Reactivity: brisk  Left pupil: Size: 3 mm  Shape: regular  Reactivity: brisk  Upgaze: normal  Downgaze: normal    CN V   Facial sensation intact  CN VII   Facial expression full, symmetric       CN VIII   CN VIII normal  Hearing: intact    CN IX, X   CN IX normal      CN XII   CN XII normal    Tongue deviation: none    Motor Exam   Muscle bulk: normal  Right arm tone: normal  Left arm tone: normal  Right leg tone: normal  Left leg tone: normal    Strength   Right deltoid: 5/5  Left deltoid: 5/5  Right biceps: 5/5  Left biceps: 5/5  Right triceps: 5/5  Left triceps: 5/5  Right wrist flexion: 5/5  Left wrist flexion: 5/5  Right wrist extension: 5/5  Left wrist extension: 5/5  Right iliopsoas: 5/5  Left iliopsoas: 5/5  Right quadriceps: 5/5  Left quadriceps: 5/5  Right hamstrin/5  Left hamstrin/5  Right anterior tibial: 5/5  Left anterior tibial: 5/5  Right gastroc: 5/5  Left gastroc: 5/5    Sensory Exam   Light touch normal      Gait, Coordination, and Reflexes     Gait  Gait: normal    Tremor   Resting tremor: absent  Action tremor: absent    Reflexes   Right brachioradialis: 2+  Left brachioradialis: 2+  Right biceps: 2+  Left biceps: 2+  Right patellar: 2+  Left patellar: 2+  Right Chen: absent  Left Chen: absent  Right ankle clonus: absent  Left ankle clonus: absent      Vitals:Blood pressure 109/64, pulse 85, temperature 99 6 °F (37 6 °C), temperature source Oral, resp  rate 17, height 5' 4 5" (1 638 m), weight 74 8 kg (164 lb 14 5 oz), last menstrual period 2021, SpO2 97 %, not currently breastfeeding  ,Body mass index is 27 87 kg/m²  Lab Results:   Results from last 7 days   Lab Units 21  1434 21  0517 21  2119 21  0910  21  0034   WBC Thousand/uL  --  6 26  --  7 87  --  8 91   HEMOGLOBIN g/dL 9 4* 7 9* 8 9* 9 3*   < > 8 5*   HEMATOCRIT % 30 0* 25 5* 28 7* 29 4*   < > 27 2*   PLATELETS Thousands/uL  --  253  --  281  --  290   NEUTROS PCT %  --  52  --   --   --  51   MONOS PCT %  --  11  --   --   --  10    < > = values in this interval not displayed       Results from last 7 days   Lab Units 21  0517 21  0910 21  0034   POTASSIUM mmol/L 3 8 4 4 3 5 CHLORIDE mmol/L 109* 110* 106   CO2 mmol/L 26 27 26   BUN mg/dL 6 5 3*   CREATININE mg/dL 0 84 0 82 0 93   CALCIUM mg/dL 7 9* 8 0* 8 2*   ALK PHOS U/L  --   --  44*   ALT U/L  --   --  26   AST U/L  --   --  16             Results from last 7 days   Lab Units 03/01/21  0034   INR  1 14   PTT seconds 28     No results found for: TROPONINT  ABG:No results found for: PHART, UCH9PJR, PO2ART, OIK1QLR, J1AMGTDT, BEART, SOURCE    Imaging Studies: I have personally reviewed pertinent reports  and I have personally reviewed pertinent films in PACS    Cta Head And Neck W Wo Contrast    Result Date: 3/2/2021  Impression: CT brain: Stable examination with no acute intracranial abnormality  CT angiography: Resolution of the previously identified right internal carotid artery dissection  The vessel is now normal in caliber with no luminal stenosis, intramural hematoma or dissection flap  Other findings: 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 the nasopharynx on series 5 image 124  If not already performed, ENT consultation and direct visual inspection recommended  This examination was marked "significant notification" in Epic in order to begin the standard process by which the radiology reading room liaison alerts the referring practitioner  Workstation performed: [id number]     Xr Chest 1 View Portable    Result Date: 3/1/2021  Impression: No acute cardiopulmonary disease  Workstation performed: [id number]       EKG, Pathology, and Other Studies: I have personally reviewed pertinent reports  VTE Prophylaxis: Sequential compression device (Venodyne)  , aspirin 81 mg, Xarelto    Code Status: Level 1 - Full Code  Advance Directive and Living Will:      Power of :    POLST:      Counseling / Coordination of Care  I spent 30 minutes with the patient

## 2021-03-02 NOTE — UTILIZATION REVIEW
Initial Clinical Review    Admission: Date/Time/Statement:   Admission Orders (From admission, onward)     Ordered        03/01/21 0216  Place in Observation  Once                   Orders Placed This Encounter   Procedures    Place in Observation     Telemetry     Standing Status:   Standing     Number of Occurrences:   1     Order Specific Question:   Level of Care     Answer:   Med Surg [16]     Order Specific Question:   Bed request comments     Answer:   telemetry     ED Arrival Information     Expected Arrival [address] Shea Sim of Arrival Escorted By Service Admission Type    - 3/1/2021 00:22 Urgent Ambulance [facility] EMS General Medicine Urgent    Arrival Complaint    -        Chief Complaint   Patient presents with    Vaginal Bleeding     PT presents to ED via EMS from home after having vaginal bleeding for 8 days  Pt on several blood thinners  VSS  Assessment/Plan: 40 yo female with hx of  Ischemic CVA, internal carotid artery dissection on Xarelto , iron deficiency anemia,hysteroscopy/polypectomy 05/2020 presents to ED via ambulance with lightheadedness  Pt started menses 8 days ago and since started xarelto in 12/2020, her menses is heavy with change of overnight pads q1h  Menses usually lasts 5 days with 1 heavy day, this month longer with passing clots  Today with position change sit to stand became lightheaded and presyncopal   On exam, pt tachycardic, skin is pale  Labs show low Hgb  Hgb was 13 4 in dec 2020  OB/GYN saw pt in ED  Pt admitted as OBS to telemetry with vaginal bleeding and anemia    Plan includes OB/GYN consult, transfuse 1 U PRBC's, monitor H/H, start Provera for 5-10 days per GYN recommendation, outpt f/u for long term options with GYN,continue xarelto, possible referral to Hem/Onc for IV iron if pt intolerant of oral Pt takes liquid iron supplement at home - Floradix  OB/GYN-pt ordered Provera to control menses , other options discussed for long term and pt leading toward endometrial ablation with pt to f/u with her GYN as outpt  3/2  Pt continued with bleeding and some clots  Plan to transfuse with 1 U PRBC's today as H/H dropping  , will repeat Hgb 3/3  Looks like carotid dissection on CTA has resolved  Pt was told to be on AC x 3 months with this being the first week of her 3rd month  Consult placed to neurology  Would ideally like to keep pt on xarelto for now, if bleeding continues to be an issue , may need to consider Coumadin  Neurosurgery-Ok to d/c xarelto ans ASA at this time, once pt stable can restart ASA 81 mg daily  No acute neurosurgery needs at this time, will continue to follow    ED Triage Vitals   Temperature Pulse Respirations Blood Pressure SpO2   03/01/21 0025 03/01/21 0025 03/01/21 0025 03/01/21 0025 03/01/21 0025   98 °F (36 7 °C) 102 16 125/68 100 %      Temp Source Heart Rate Source Patient Position - Orthostatic VS BP Location FiO2 (%)   03/01/21 0025 03/01/21 0330 03/01/21 0330 03/01/21 0330 --   Oral Monitor Lying Left arm       Pain Score       03/02/21 0420       1          Wt Readings from Last 1 Encounters:   03/01/21 74 8 kg (164 lb 14 5 oz)     Additional Vital Signs:   Date/Time  Temp  Pulse  Resp  BP  MAP (mmHg)  SpO2    03/02/21 0707  98 5 °F (36 9 °C)  65  16  109/65  81  99 %    03/02/21 0300  --  80  17  120/70  --  100 %    03/01/21 2015  98 9 °F (37 2 °C)  82  16  111/69  --  100 %    03/01/21 1630  --  74  17  104/61  78  98 %    03/01/21 1450  --  73  16  93/59  --  99 %    03/01/21 1200  --  74  18  110/64  82  99 %    03/01/21 1100  --  76  --  102/64  77  99 %    03/01/21 1030  --  82  18  102/70  81  100 %    03/01/21 0915  --  76  18  104/68  81  99 %    03/01/21 0800  --  60  16  93/54  69  97 %    03/01/21 0700  --  62  17  102/54  74  97 %    03/01/21 0630  --  64  16  97/55  73  95 %    03/01/21 0530  --  72  17  107/61  78  99 %    03/01/21 0500  98 6 °F (37 °C)  78  16  109/62  80  99 %    03/01/21 9555  --  --  --  --  --  96 % 03/01/21 0445  --  70  16  106/55  75  95 %    03/01/21 0415  98 8 °F (37 1 °C)  76  18  107/57  77  97 %    03/01/21 0400  98 4 °F (36 9 °C)  84  16  107/59  79  98 %    03/01/21 0345  98 3 °F (36 8 °C)  78  17  109/62  --  97 %    03/01/21 0330  --  86  16  107/63  --  99 %    03/01/21 0326  98 1 °F (36 7 °C)  106Abnormal   18  100/61  --  --        Pertinent Labs/Diagnostic Test Results:   3/1 CTA head and neck-  3/1 CXR  No acute cardiopulmonary disease    3/1 ECG  Normal sinus rhythm  Cannot rule out Anterior infarct , age undetermined  ST & T wave abnormality, consider inferior ischemia  Abnormal ECG      Results from last 7 days   Lab Units 03/02/21  0517 03/01/21  2119 03/01/21  0910 03/01/21  0540 03/01/21  0034   WBC Thousand/uL 6 26  --  7 87  --  8 91   HEMOGLOBIN g/dL 7 9* 8 9* 9 3* 9 3* 8 5*   HEMATOCRIT % 25 5* 28 7* 29 4* 29 7* 27 2*   PLATELETS Thousands/uL 253  --  281  --  290   NEUTROS ABS Thousands/µL 3 27  --   --   --  4 56         Results from last 7 days   Lab Units 03/02/21 0517 03/01/21  0910 03/01/21  0034   SODIUM mmol/L 140 143 141   POTASSIUM mmol/L 3 8 4 4 3 5   CHLORIDE mmol/L 109* 110* 106   CO2 mmol/L 26 27 26   ANION GAP mmol/L 5 6 9   BUN mg/dL 6 5 3*   CREATININE mg/dL 0 84 0 82 0 93   EGFR ml/min/1 73sq m 85 87 75   CALCIUM mg/dL 7 9* 8 0* 8 2*     Results from last 7 days   Lab Units 03/01/21  0034   AST U/L 16   ALT U/L 26   ALK PHOS U/L 44*   TOTAL PROTEIN g/dL 6 3*   ALBUMIN g/dL 3 2*   TOTAL BILIRUBIN mg/dL 0 18*         Results from last 7 days   Lab Units 03/02/21 0517 03/01/21  0910 03/01/21  0034   GLUCOSE RANDOM mg/dL 91 92 97             No results found for: BETA-HYDROXYBUTYRATE                   Results from last 7 days   Lab Units 03/01/21  0034   TROPONIN I ng/mL <0 02         Results from last 7 days   Lab Units 03/01/21  0034   PROTIME seconds 14 7*   INR  1 14   PTT seconds 28     Results from last 7 days   Lab Units 03/01/21  0034   TSH [address] [place] [address] uIU/mL 5 320*                                                                                           ED Treatment:   Medication Administration from 03/01/2021 0022 to 03/02/2021 0815       Date/Time Order Dose Route Action Action by Comments     03/01/2021 0137 sodium chloride 0 9 % bolus 1,000 mL 0 mL Intravenous Stopped Alex Ac, KIRAN      03/01/2021 0035 sodium chloride 0 9 % bolus 1,000 mL 1,000 mL Intravenous [address] Alex Ac RN      03/02/2021 0524 sodium chloride 0 9 % infusion 125 mL/hr Intravenous [facility] Rick, RN      03/02/2021 0248 sodium chloride 0 9 % infusion 0 mL/hr Intravenous Stopped Chrissymeg Cabrera, KIRAN      03/01/2021 0126 sodium chloride 0 9 % infusion 125 mL/hr Intravenous [address] Alex Ac RN      03/01/2021 0049 ondansetron (ZOFRAN) injection 4 mg 4 mg Intravenous Given Alex Ac RN      03/02/2021 0810 aspirin (ECOTRIN LOW STRENGTH) EC tablet 81 mg 81 mg Oral Given Angela Newton RN      03/01/2021 0910 aspirin (ECOTRIN LOW STRENGTH) EC tablet 81 mg 81 mg Oral Given Angela Newton RN      03/01/2021 1754 atorvastatin (LIPITOR) tablet 40 mg 40 mg Oral Given Wendy Mello RN      03/02/2021 2665 magnesium gluconate (MAGONATE) tablet 500 mg 500 mg Oral Given Angela Newton RN      03/01/2021 0950 magnesium gluconate (MAGONATE) tablet 500 mg 500 mg Oral Given Angela Newton RN      03/02/2021 0810 rivaroxaban (XARELTO) tablet 15 mg 15 mg Oral Given Angela Newton RN      03/02/2021 1069 rivaroxaban (XARELTO) tablet 15 mg 0 mg Oral Hold Gayle Reaves RN Patient wants to take at 0800     03/01/2021 0910 rivaroxaban (XARELTO) tablet 15 mg 15 mg Oral Given Angela Newton RN      03/02/2021 2766 medroxyPROGESTERone (PROVERA) tablet 5 mg 5 mg Oral Given Angela Newton RN      03/01/2021 4722 medroxyPROGESTERone (PROVERA) tablet 5 mg 5 mg Oral Given Angela Newton RN      03/01/2021 0455 ALPRAZolam Layvonne Ala) tablet 0 5 mg 0 5 mg Oral Given Jose Gonzalez RN      03/01/2021 1923 iohexol (OMNIPAQUE) 350 MG/ML injection (SINGLE-DOSE) 100 mL 85 mL Intravenous Given Sharpe [place]         Past Medical History:   Diagnosis Date    Asthma     ACTIVITY INDUCED ASTHMA    IBS (irritable bowel syndrome)      Present on Admission:   Internal carotid artery dissection (HCC)   Anemia   Ischemic stroke [facility])      Admitting Diagnosis: Vaginal bleeding [N93 9]  Age/Sex: 41 y o  female  Admission Orders:  Scheduled Medications:  aspirin, 81 mg, Oral, Daily  atorvastatin, 40 mg, Oral, QPM  magnesium gluconate, 500 mg, Oral, Daily  medroxyPROGESTERone, 5 mg, Oral, Daily  rivaroxaban, 15 mg, Oral, Daily With Breakfast      Continuous IV Infusions:  sodium chloride, 125 mL/hr, Intravenous, Continuous      PRN Meds:none     SCD's     IP CONSULT TO OB GYN    Network Utilization Review Department  ATTENTION: Please call with any questions or concerns to [phone] and carefully listen to the prompts so that you are directed to the right person  All voicemails are confidential   Brock Cardoza all requests for admission clinical reviews, approved or denied determinations and any other requests to dedicated fax number below belonging to the campus where the patient is receiving treatment   List of dedicated fax numbers for the Facilities:  [address] DENIALS (Administrative/Medical Necessity) [phone]   [address] (Maternity/NICU/Pediatrics) [phone]   [address] 40 [address] [facility] Vijay Toro 0150 (Abdirashid Paz "Vandana" 103) [address] [facility] [address] [place]   Abdirashid Patrick 37 [address] [place] [address] [phone]

## 2021-03-02 NOTE — ASSESSMENT & PLAN NOTE
2 year history of iron deficiency anemia now presenting with acute blood loss anemia in the setting of menorrhagia and Xarelto      Plan:  · Continue home Floradix ( iron and herb liquid supplement)  · Reports intolerance to iron tablets 2/2 constipation

## 2021-03-02 NOTE — DISCHARGE INSTR - AVS FIRST PAGE
Dear Jose Coronel,     It was our pleasure to care for you here at [facility]  It is our hope that we were always able to exceed the expected standards for your care during your stay  You were hospitalized due to vaginal bleeding  You were cared for on the ED floor by Eder Brenner MD under the service of Anita Leiva MD with the [facility] who covers for your primary care physician (PCP), eGrald Rivera MD, while you were hospitalized  If you have any questions or concerns related to this hospitalization, you may contact us at [phone]  For follow up as well as any medication refills, we recommend that you follow up with your primary care physician  A registered nurse will reach out to you by phone within a few days after your discharge to answer any additional questions that you may have after going home  However, at this time we provide for you here, the most important instructions / recommendations at discharge:     · Notable Medication Adjustments -   · Provera 5mg x 1 more day, as she has completed 4/5 recommended days per Ob/gyn   · Testing Required after Discharge -   · None  · Important follow up information -   · Follow up with primary Ob/gyn team at [facility]  · Other Instructions -   · None  · Please review this entire after visit summary as additional general instructions including medication list, appointments, activity, diet, any pertinent wound care, and other additional recommendations from your care team that may be provided for you        Sincerely,     Eder Brenner MD

## 2021-03-02 NOTE — DISCHARGE SUMMARY
Discharge- Reji Carbajal 1975, 41 y o  female MRN: [id number]    Unit/Bed#: S -01 Encounter: [id number]    Primary Care Provider: Anastacia Valdivia MD   Date and time admitted to hospital: 3/1/2021 12:22 AM        * Vaginal bleeding  Assessment & Plan  Presenting with 8 days of menorrhagia  Now presenting with dizziness and pre-syncope  On Xarelto since December for carotid dissection  Heavy menstrual periods since then Hemoglobin 8 5 on admission s/p 1 unit RBCs in the ED with improvement of Hemoglobin to 9 3  Patient with continued clotting and bleeding overnight  Hg stable this am at 9  Plan:  · Hg stable s/p 2 units of PRBC since admission  · Repeat 2pm, stable at 9 4  · OB/GYN consulted- per recs continue Provera 5mg on day 4/5  · Discussed with Neurosurgery patient cleared to discontinue Xarelto   · Recommend outpatient follow up with [facility] for long term options    Ischemic stroke [facility])  Assessment & Plan  MRI 12/31/2020: Right frontal focus of cortical acute to subacute ischemia    Plan  · Neurosurgery consulted patient cleared to stop Xarelto  · Continue Aspirin  · Followed by Neurology as an outpatient (Dr Aponte)        Anemia  Assessment & Plan  2 year history of iron deficiency anemia now presenting with acute blood loss anemia in the setting of menorrhagia and Xarelto  Plan:  · Continue home Floradix ( iron and herb liquid supplement)  · Reports intolerance to iron tablets 2/2 constipation    Internal carotid artery dissection [facility])  Assessment & Plan  Presented for admission on 12/26/2020 with a persistent headache which began after chiropractic manipulation of her neck on 12/20/2020  CTA head and neck: "High-grade stenosis terminating in occlusion at the C1 level of the right cervical ICA suggesting dissection   There is decreased flow distally in the right cervical and right cavernous ICA possibly from retrograde flow " She was seen by Neurosurgery and Vascular Surgery with no acute interventions recommended  3/1/21- CTA head and neck showing CT angiography: Resolution of the previously identified right internal carotid artery dissection  The vessel is now normal in caliber with no luminal stenosis, intramural hematoma or dissection flap      Other findings: 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 the nasopharynx on series 5 image 124  If not already performed, ENT consultation and direct visual inspection recommended  Plan  · Discontinue Xarelto  · Continue aspirin 81 mg daily  · Inpatient consult to Neurosurgery- patient cleared to d/c Xarelto as likely a precipitating factor in continued menorrhagia  · PT/OT as an outpatient due to balance and coordination issues      Discharging Resident Physician: Pippa Norton MD  Attending: Taya Moss MD  PCP: Edson Pemberton MD  Admission Date: 3/1/2021  Discharge Date: 03/04/21    Disposition:     Home    Reason for Admission: Vaginal bleeding    Consultations During Hospital Stay:  · Ob/Gyn    Procedures Performed:     · None    Significant Findings / Test Results:     · 3/1/21 CXR- No acute cardiopulmonary disease  · 3/1/21 CTA head and neck w wo contrast-    Incidental Findings:   · None    Test Results Pending at Discharge (will require follow up): · None     Outpatient Tests Requested:  · None    Complications:  None    Hospital Course:     Henrietta Chavira is a 41 y o  female patient with a past medical history of CVA, internal carotid artery dissection, anticoagulation with Xarelto and iron deficiency anemia  who originally presented to the hospital on 3/1/2021 due to lightheadedness and a presyncopal episode at home  Patient reported her menstrual period started on 2/21/21 and was significant for increased menorrhagia and multiple lemon-sized clots throughout the day prior to admission   Patient symptomatically anemic was transfused 2 unit of PRBC during this admission with good response  Ob/gyn was consulted and recommended Provera progesterone only OCP for AUB  Patient will continue with 1 more day of Provera  Discussed importance of follow up with [facility]  Patient now with symptoms resolved is considered stable for discharge  Condition at Discharge: stable     Discharge Day Visit / Exam:     Subjective:  Patient reporting improvement in lightheadedness and fatigue  No bleeding/passage of clots overnight  Vitals: Blood Pressure: 105/65 (03/04/21 0755)  Pulse: 82 (03/04/21 0755)  Temperature: 98 8 °F (37 1 °C) (03/04/21 0755)  Temp Source: Oral (03/04/21 0755)  Respirations: 18 (03/04/21 0755)  Height: 5' 4 5" (163 8 cm) (03/01/21 0025)  Weight - Scale: 74 8 kg (164 lb 14 5 oz) (03/01/21 0025)  SpO2: 98 % (03/04/21 0755)  Exam:   Physical Exam  Constitutional:       Appearance: She is well-developed  She is obese  HENT:      Head: Normocephalic and atraumatic  Nose: Nose normal    Eyes:      Conjunctiva/sclera: Conjunctivae normal    Neck:      Musculoskeletal: Normal range of motion and neck supple  Cardiovascular:      Rate and Rhythm: Normal rate and regular rhythm  Heart sounds: Normal heart sounds  Pulmonary:      Effort: Pulmonary effort is normal       Breath sounds: Normal breath sounds  Abdominal:      General: Bowel sounds are normal       Palpations: Abdomen is soft  Musculoskeletal: Normal range of motion  Skin:     General: Skin is warm and dry  Neurological:      Mental Status: She is oriented to person, place, and time  Discussion with Family: Discussed plan of care with patient    Discharge instructions/Information to patient and family:   See after visit summary for information provided to patient and family  Provisions for Follow-Up Care:  See after visit summary for information related to follow-up care and any pertinent home health orders        Planned Readmission: None     Discharge Medications:  See after visit summary for reconciled discharge medications provided to patient and family        ** Please Note: This note has been constructed using a voice recognition system **

## 2021-03-02 NOTE — ASSESSMENT & PLAN NOTE
· Found on MRI brain secondary to carotid dissection  · Was on ASA 81mg and Xarelto  · Cleared to hold both medication at this time with plans to restart patient on ASA 81mg when medically appropriate

## 2021-03-02 NOTE — ED NOTES
Patient assisted to bedside commode  Patient passed three golf ball sized clots        Phong, [employer], RN  03/02/21 9235

## 2021-03-02 NOTE — ED NOTES
Breakfast tray provided  Pt medicated per admission orders with negative complications   VS  Will continue to monitor     Roddy Tavarez RN  03/02/21 5483

## 2021-03-02 NOTE — ASSESSMENT & PLAN NOTE
· Provera x5 days   · Outpatient [facility] follow up   · Appreciate inpatient management and recommendations at this time

## 2021-03-02 NOTE — ASSESSMENT & PLAN NOTE
Presented for admission on 12/26/2020 with a persistent headache which began after chiropractic manipulation of her neck on 12/20/2020  CTA head and neck: "High-grade stenosis terminating in occlusion at the C1 level of the right cervical ICA suggesting dissection  There is decreased flow distally in the right cervical and right cavernous ICA possibly from retrograde flow " She was seen by Neurosurgery and Vascular Surgery with no acute interventions recommended  3/1/21- CTA head and neck showing CT angiography: Resolution of the previously identified right internal carotid artery dissection  The vessel is now normal in caliber with no luminal stenosis, intramural hematoma or dissection flap      Other findings: 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

## 2021-03-03 ENCOUNTER — TELEPHONE (OUTPATIENT)
Dept: NEUROSURGERY | Facility: CLINIC | Age: 46
End: 2021-03-03

## 2021-03-03 LAB
ABO GROUP BLD BPU: NORMAL
ABO GROUP BLD BPU: NORMAL
ANION GAP SERPL CALCULATED.3IONS-SCNC: 5 MMOL/L (ref 4–13)
BASOPHILS # BLD AUTO: 0.03 THOUSANDS/ΜL (ref 0–0.1)
BASOPHILS NFR BLD AUTO: 0 % (ref 0–1)
BPU ID: NORMAL
BPU ID: NORMAL
BUN SERPL-MCNC: 4 MG/DL (ref 5–25)
CALCIUM SERPL-MCNC: 8 MG/DL (ref 8.3–10.1)
CHLORIDE SERPL-SCNC: 109 MMOL/L (ref 100–108)
CO2 SERPL-SCNC: 27 MMOL/L (ref 21–32)
CREAT SERPL-MCNC: 0.8 MG/DL (ref 0.6–1.3)
CROSSMATCH: NORMAL
CROSSMATCH: NORMAL
EOSINOPHIL # BLD AUTO: 0.17 THOUSAND/ΜL (ref 0–0.61)
EOSINOPHIL NFR BLD AUTO: 2 % (ref 0–6)
ERYTHROCYTE [DISTWIDTH] IN BLOOD BY AUTOMATED COUNT: 15.1 % (ref 11.6–15.1)
GFR SERPL CREATININE-BSD FRML MDRD: 90 ML/MIN/1.73SQ M
GLUCOSE SERPL-MCNC: 90 MG/DL (ref 65–140)
HCT VFR BLD AUTO: 28.6 % (ref 34.8–46.1)
HCT VFR BLD AUTO: 30.8 % (ref 34.8–46.1)
HGB BLD-MCNC: 9 G/DL (ref 11.5–15.4)
HGB BLD-MCNC: 9.6 G/DL (ref 11.5–15.4)
IMM GRANULOCYTES # BLD AUTO: 0.03 THOUSAND/UL (ref 0–0.2)
IMM GRANULOCYTES NFR BLD AUTO: 0 % (ref 0–2)
LYMPHOCYTES # BLD AUTO: 1.96 THOUSANDS/ΜL (ref 0.6–4.47)
LYMPHOCYTES NFR BLD AUTO: 27 % (ref 14–44)
MCH RBC QN AUTO: 26.6 PG (ref 26.8–34.3)
MCHC RBC AUTO-ENTMCNC: 31.5 G/DL (ref 31.4–37.4)
MCV RBC AUTO: 85 FL (ref 82–98)
MONOCYTES # BLD AUTO: 0.63 THOUSAND/ΜL (ref 0.17–1.22)
MONOCYTES NFR BLD AUTO: 9 % (ref 4–12)
NEUTROPHILS # BLD AUTO: 4.47 THOUSANDS/ΜL (ref 1.85–7.62)
NEUTS SEG NFR BLD AUTO: 62 % (ref 43–75)
NRBC BLD AUTO-RTO: 0 /100 WBCS
PLATELET # BLD AUTO: 282 THOUSANDS/UL (ref 149–390)
PMV BLD AUTO: 10.9 FL (ref 8.9–12.7)
POTASSIUM SERPL-SCNC: 3.6 MMOL/L (ref 3.5–5.3)
RBC # BLD AUTO: 3.38 MILLION/UL (ref 3.81–5.12)
SODIUM SERPL-SCNC: 141 MMOL/L (ref 136–145)
UNIT DISPENSE STATUS: NORMAL
UNIT DISPENSE STATUS: NORMAL
UNIT PRODUCT CODE: NORMAL
UNIT PRODUCT CODE: NORMAL
UNIT RH: NORMAL
UNIT RH: NORMAL
WBC # BLD AUTO: 7.29 THOUSAND/UL (ref 4.31–10.16)

## 2021-03-03 PROCEDURE — 85018 HEMOGLOBIN: CPT | Performed by: FAMILY MEDICINE

## 2021-03-03 PROCEDURE — 85014 HEMATOCRIT: CPT | Performed by: FAMILY MEDICINE

## 2021-03-03 PROCEDURE — 85025 COMPLETE CBC W/AUTO DIFF WBC: CPT | Performed by: FAMILY MEDICINE

## 2021-03-03 PROCEDURE — 80048 BASIC METABOLIC PNL TOTAL CA: CPT | Performed by: FAMILY MEDICINE

## 2021-03-03 PROCEDURE — 99232 SBSQ HOSP IP/OBS MODERATE 35: CPT | Performed by: FAMILY MEDICINE

## 2021-03-03 RX ADMIN — MEDROXYPROGESTERONE ACETATE 5 MG: 2.5 TABLET ORAL at 08:36

## 2021-03-03 RX ADMIN — ATORVASTATIN CALCIUM 40 MG: 40 TABLET, FILM COATED ORAL at 17:50

## 2021-03-03 RX ADMIN — Medication 500 MG: at 08:36

## 2021-03-03 RX ADMIN — SODIUM CHLORIDE 125 ML/HR: 0.9 INJECTION, SOLUTION INTRAVENOUS at 13:46

## 2021-03-03 RX ADMIN — SODIUM CHLORIDE 125 ML/HR: 0.9 INJECTION, SOLUTION INTRAVENOUS at 05:17

## 2021-03-03 NOTE — PLAN OF CARE
Problem: Potential for Falls  Goal: Patient will remain free of falls  Description: INTERVENTIONS:  - Assess patient frequently for physical needs  -  Identify cognitive and physical deficits and behaviors that affect risk of falls  -  [place] fall precautions as indicated by assessment   - Educate patient/family on patient safety including physical limitations  - Instruct patient to call for assistance with activity based on assessment  - Modify environment to reduce risk of injury  - Consider OT/PT consult to assist with strengthening/mobility  Outcome: Progressing     Problem: SAFETY ADULT  Goal: Patient will remain free of falls  Description: INTERVENTIONS:  - Assess patient frequently for physical needs  -  Identify cognitive and physical deficits and behaviors that affect risk of falls    -  [place] fall precautions as indicated by assessment   - Educate patient/family on patient safety including physical limitations  - Instruct patient to call for assistance with activity based on assessment  - Modify environment to reduce risk of injury  - Consider OT/PT consult to assist with strengthening/mobility  Outcome: Progressing  Goal: Maintain or return to baseline ADL function  Description: INTERVENTIONS:  -  Assess patient's ability to carry out ADLs; assess patient's baseline for ADL function and identify physical deficits which impact ability to perform ADLs (bathing, care of mouth/teeth, toileting, grooming, dressing, etc )  - Assess/evaluate cause of self-care deficits   - Assess range of motion  - Assess patient's mobility; develop plan if impaired  - Assess patient's need for assistive devices and provide as appropriate  - Encourage maximum independence but intervene and supervise when necessary  - Involve family in performance of ADLs  - Assess for home care needs following discharge   - Consider OT consult to assist with ADL evaluation and planning for discharge  - Provide patient education as appropriate  Outcome: Progressing  Goal: Maintain or return mobility status to optimal level  Description: INTERVENTIONS:  - Assess patient's baseline mobility status (ambulation, transfers, stairs, etc )    - Identify cognitive and physical deficits and behaviors that affect mobility  - Identify mobility aids required to assist with transfers and/or ambulation (gait belt, sit-to-stand, lift, walker, cane, etc )  - [place] fall precautions as indicated by assessment  - Record patient progress and toleration of activity level on Mobility SBAR; progress patient to next Phase/Stage  - Instruct patient to call for assistance with activity based on assessment  - Consider rehabilitation consult to assist with strengthening/weightbearing, etc   Outcome: Progressing     Problem: DISCHARGE PLANNING  Goal: Discharge to home or other facility with appropriate resources  Description: INTERVENTIONS:  - Identify barriers to discharge w/patient and caregiver  - Arrange for needed discharge resources and transportation as appropriate  - Identify discharge learning needs (meds, wound care, etc )  - Arrange for interpretive services to assist at discharge as needed  - Refer to Case Management Department for coordinating discharge planning if the patient needs post-hospital services based on physician/advanced practitioner order or complex needs related to functional status, cognitive ability, or social support system  Outcome: Progressing     Problem: HEMATOLOGIC - ADULT  Goal: Maintains hematologic stability  Description: INTERVENTIONS  - Assess for signs and symptoms of bleeding or hemorrhage  - Monitor labs  - Administer supportive blood products/factors as ordered and appropriate  Outcome: Progressing

## 2021-03-03 NOTE — PLAN OF CARE
Problem: Potential for Falls  Goal: Patient will remain free of falls  Description: INTERVENTIONS:  - Assess patient frequently for physical needs  -  Identify cognitive and physical deficits and behaviors that affect risk of falls  -  [place] fall precautions as indicated by assessment   - Educate patient/family on patient safety including physical limitations  - Instruct patient to call for assistance with activity based on assessment  - Modify environment to reduce risk of injury  - Consider OT/PT consult to assist with strengthening/mobility  Outcome: Progressing     Problem: SAFETY ADULT  Goal: Patient will remain free of falls  Description: INTERVENTIONS:  - Assess patient frequently for physical needs  -  Identify cognitive and physical deficits and behaviors that affect risk of falls    -  [place] fall precautions as indicated by assessment   - Educate patient/family on patient safety including physical limitations  - Instruct patient to call for assistance with activity based on assessment  - Modify environment to reduce risk of injury  - Consider OT/PT consult to assist with strengthening/mobility  Outcome: Progressing  Goal: Maintain or return to baseline ADL function  Description: INTERVENTIONS:  -  Assess patient's ability to carry out ADLs; assess patient's baseline for ADL function and identify physical deficits which impact ability to perform ADLs (bathing, care of mouth/teeth, toileting, grooming, dressing, etc )  - Assess/evaluate cause of self-care deficits   - Assess range of motion  - Assess patient's mobility; develop plan if impaired  - Assess patient's need for assistive devices and provide as appropriate  - Encourage maximum independence but intervene and supervise when necessary  - Involve family in performance of ADLs  - Assess for home care needs following discharge   - Consider OT consult to assist with ADL evaluation and planning for discharge  - Provide patient education as appropriate  Outcome: Progressing  Goal: Maintain or return mobility status to optimal level  Description: INTERVENTIONS:  - Assess patient's baseline mobility status (ambulation, transfers, stairs, etc )    - Identify cognitive and physical deficits and behaviors that affect mobility  - Identify mobility aids required to assist with transfers and/or ambulation (gait belt, sit-to-stand, lift, walker, cane, etc )  - [place] fall precautions as indicated by assessment  - Record patient progress and toleration of activity level on Mobility SBAR; progress patient to next Phase/Stage  - Instruct patient to call for assistance with activity based on assessment  - Consider rehabilitation consult to assist with strengthening/weightbearing, etc   Outcome: Progressing     Problem: DISCHARGE PLANNING  Goal: Discharge to home or other facility with appropriate resources  Description: INTERVENTIONS:  - Identify barriers to discharge w/patient and caregiver  - Arrange for needed discharge resources and transportation as appropriate  - Identify discharge learning needs (meds, wound care, etc )  - Arrange for interpretive services to assist at discharge as needed  - Refer to Case Management Department for coordinating discharge planning if the patient needs post-hospital services based on physician/advanced practitioner order or complex needs related to functional status, cognitive ability, or social support system  Outcome: Progressing     Problem: HEMATOLOGIC - ADULT  Goal: Maintains hematologic stability  Description: INTERVENTIONS  - Assess for signs and symptoms of bleeding or hemorrhage  - Monitor labs  - Administer supportive blood products/factors as ordered and appropriate  Outcome: Progressing

## 2021-03-03 NOTE — PROGRESS NOTES
Progress Note Jean Pierre Horner 1975, 41 y o  female MRN: [id number]    Unit/Bed#: S -01 Encounter: [id number]    Primary Care Provider: Tracy Avelar MD   Date and time admitted to hospital: 3/1/2021 12:22 AM        * Vaginal bleeding  Assessment & Plan  Presenting with 8 days of menorrhagia  Now presenting with dizziness and pre-syncope  On Xarelto since December for carotid dissection  Heavy menstrual periods since then Hemoglobin 8 5 on admission s/p 1 unit RBCs in the ED with improvement of Hemoglobin to 9 3  Patient with continued clotting and bleeding overnight  Hg stable this am at 9  Plan:  · Hg stable s/p 2 units of PRBC since admission  · Repeat H&H 2pm ordered  · Consider continued transfusion < 8 5, as patient has remained symptomatic (baseline Hg 10-11)  · OB/GYN consulted- per recs continue Provera 5mg x5  · Discussed with Neurosurgery that patient is cleared to d/c Xarelto at this time  · Hold Xarelto, for any mental status change or c/o of focal deficits obtain STAT CT head wo contrast  · Recommend outpatient follow up with [facility] for long term options    Ischemic stroke [facility])  Assessment & Plan  MRI 12/31/2020: Right frontal focus of cortical acute to subacute ischemia    Plan  · Neurosurgery consulted appreciate recs  · Followed by Neurology as an outpatient (Dr Aponte)  · Continue aspirin   · Hold Xarelto    Anemia  Assessment & Plan  2 year history of iron deficiency anemia now presenting with acute blood loss anemia in the setting of menorrhagia and Xarelto  Plan:  · Continue home Floradix ( iron and herb liquid supplement)  · Reports intolerance to iron tablets 2/2 constipation    Internal carotid artery dissection [facility])  Assessment & Plan  Presented for admission on 12/26/2020 with a persistent headache which began after chiropractic manipulation of her neck on 12/20/2020    CTA head and neck: "High-grade stenosis terminating in occlusion at the C1 level of the right cervical ICA suggesting dissection  There is decreased flow distally in the right cervical and right cavernous ICA possibly from retrograde flow " She was seen by Neurosurgery and Vascular Surgery with no acute interventions recommended  3/1/21- CTA head and neck showing CT angiography: Resolution of the previously identified right internal carotid artery dissection  The vessel is now normal in caliber with no luminal stenosis, intramural hematoma or dissection flap      Other findings: 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 the nasopharynx on series 5 image 124  If not already performed, ENT consultation and direct visual inspection recommended  Plan  · Hold Xarelto  · Continue aspirin 81 mg daily  · Inpatient consult to Neurosurgery- patient cleared to d/c Xarelto as likely a precipitating factor in continued menorrhagia  · PT/OT as an outpatient due to balance and coordination issues        VTE Pharmacologic Prophylaxis:   Pharmacologic: Per discussion with Neurosurgery will discontinue Xarelto given resolution of cartoid artery dissection  Mechanical VTE Prophylaxis in Place: Yes    Discussions with Specialists or Other Care Team Provider: Neurosurgery     Education and Discussions with Family / Patient: Discussed plan of care with patient and is in agreement    Current Length of Stay: 0 day(s)    Current Patient Status: Inpatient     Discharge Plan / Estimated Discharge Date: 24-48 hours    Code Status: Level 1 - Full Code      Subjective:   Patient reports improvement in clotting, still reporting some fatigue  Objective:     Vitals:   Temp (24hrs), Av 6 °F (37 °C), Min:98 1 °F (36 7 °C), Max:99 6 °F (37 6 °C)    Temp:  [98 1 °F (36 7 °C)-99 6 °F (37 6 °C)] 98 3 °F (36 8 °C)  HR:  [73-85] 73  Resp:  [17-18] 18  BP: ()/(52-64) 100/59  SpO2:  [97 %-100 %] 97 %  Body mass index is 27 87 kg/m²       Input and Output Summary (last 24 hours): Intake/Output Summary (Last 24 hours) at 3/3/2021 1205  Last data filed at 3/3/2021 0630  Gross per 24 hour   Intake --   Output 1500 ml   Net -1500 ml       Physical Exam:     Physical Exam  Constitutional:       Appearance: She is well-developed  HENT:      Head: Normocephalic and atraumatic  Nose: Nose normal    Eyes:      Conjunctiva/sclera: Conjunctivae normal    Neck:      Musculoskeletal: Normal range of motion and neck supple  Cardiovascular:      Rate and Rhythm: Normal rate and regular rhythm  Heart sounds: Normal heart sounds  Pulmonary:      Effort: Pulmonary effort is normal       Breath sounds: Normal breath sounds  Abdominal:      General: Bowel sounds are normal       Palpations: Abdomen is soft  Musculoskeletal: Normal range of motion  Skin:     General: Skin is warm  Coloration: Skin is pale  Neurological:      Mental Status: She is alert and oriented to person, place, and time  Additional Data:     Labs:    Results from last 7 days   Lab Units 03/03/21  0447   WBC Thousand/uL 7 29   HEMOGLOBIN g/dL 9 0*   HEMATOCRIT % 28 6*   PLATELETS Thousands/uL 282   NEUTROS PCT % 62   LYMPHS PCT % 27   MONOS PCT % 9   EOS PCT % 2     Results from last 7 days   Lab Units 03/03/21  0447  03/01/21  0034   POTASSIUM mmol/L 3 6   < > 3 5   CHLORIDE mmol/L 109*   < > 106   CO2 mmol/L 27   < > 26   BUN mg/dL 4*   < > 3*   CREATININE mg/dL 0 80   < > 0 93   CALCIUM mg/dL 8 0*   < > 8 2*   ALK PHOS U/L  --   --  44*   ALT U/L  --   --  26   AST U/L  --   --  16    < > = values in this interval not displayed  Results from last 7 days   Lab Units 03/01/21  0034   INR  1 14       * I Have Reviewed All Lab Data Listed Above  * Additional Pertinent Lab Tests Reviewed:  All Labs Within Last 24 Hours Reviewed    Imaging:    Imaging Reports Reviewed Today Include: No new imaging   Imaging Personally Reviewed by Myself Includes:  As above    Recent Cultures (last 7 days): Last 24 Hours Medication List:   Current Facility-Administered Medications   Medication Dose Route Frequency Provider Last Rate    aspirin  81 mg Oral Daily Jaylen Layton, DO      atorvastatin  40 mg Oral QPM Jaylen Nurys, DO      magnesium gluconate  500 mg Oral Daily Jaylen Nurys, DO      medroxyPROGESTERone  5 mg Oral Daily Jaylen Nurys, DO      sodium chloride  125 mL/hr Intravenous Continuous Darylene Knee,  mL/hr (03/03/21 9367)        Today, Patient Was Seen By: Ulisses Casanova MD    ** Please Note: This note has been constructed using a voice recognition system   **

## 2021-03-03 NOTE — UTILIZATION REVIEW
Initial Inpatient review  OBSERVATION 3/1/21 @0216 CONVERTED TO INPATIENT ADMISSION 3/3/21 @1200 DUE TO CONTINUED STAY REQUIRED TO EVALUATE AND TREAT PATIENT WITH VAGINAL BLEEDING/SYMPTOMATIC ANEMIA AND HX OF CAROTID DISSECTION ON XARELTO UNTIL 3/3/21 WITH CONTINUED H/H MONITORING S/P TRANSFUSIONS, STAT CT-HEAD FOR ANY MENTAL STATUS CHANGES OR FOCAL DEFECITS    Date: 3/3/21                      Admission Orders (From admission, onward)     Ordered        03/03/21 1200  Inpatient Admission  Once         03/01/21 0216  Place in Observation  Once                   Orders Placed This Encounter   Procedures    Inpatient Admission     Standing Status:   Standing     Number of Occurrences:   1     Order Specific Question:   Level of Care     Answer:   Med Surg [16]     Order Specific Question:   Estimated length of stay     Answer:   More than 2 Midnights     Order Specific Question:   Certification     Answer:   I certify that inpatient services are medically necessary for this patient for a duration of greater than two midnights  See H&P and MD Progress Notes for additional information about the patient's course of treatment  Current Patient Class: OBS Current Level of Care: MS    HPI:45 y o  female with hx of  Ischemic CVA, internal carotid artery dissection on Xarelto , iron deficiency anemia,hysteroscopy/polypectomy 05/2020  initially admitted on 3/1/21 thru ED via ambulance as OBS to telemetry with vaginal bleeding, anemia, lightheadedness   Pt had started menses 8 days ago and since started xarelto in 12/2020, her menses is heavy with change of overnight pads q1h  Menses usually lasts 5 days with 1 heavy day, this month longer with passing clots  On day of admission,pt lightheaded with position change sit to stand and presyncopal Labs showed Hgb of 7 9 down from 13 9 in Dec 2020  Plan included telemetry, monitor H/H, continue Eulas Makayla, consult to OB/GYN who started pt on Provera as a temporary measure   Pt transfused with 1 U PRBC's 3/1 with improved Hgb, Hgb subsequently dropped as vaginal bleeding with clots continued and pt transfused with 1 U PRBC's 3/2 as well  OB/GYN discussed long term plan with pt and pt leaning toward option of endometrial ablation  Neurosurgery was consulted 3/2 as CTA showed that carotid dissection for which pt on xarelto may have resolved  neurosurgery ok'ed d/c of xarelto and ASA with plan to restart ASA 81 mg once pt stable  Telemetry d/c'ed 3/2  Assessment/Plan: 3/3/21  Pt remains on IVF   Hgb up to 9 4 on 3/2 post transfusion of 1 U  PRBC's 3/2  Pt continued with vaginal bleeding and clots overnight, reports clotting improved today  Pt remains fatigued  Hgb 3/3 am =9 0  Pt for repeat H/H @1400 today  Will consider continued transfusion hgb < 8 5, as patient has remained symptomatic (baseline Hg 10-11)  Xarelto has been d/c'ed today, will obtain stat CT head for any mental status changes or focal neuro deficits  Pertinent Labs/Diagnostic Results:       Results from last 7 days   Lab Units 03/03/21  0447 03/02/21  1434 03/02/21  0517 03/01/21  2119 03/01/21  0910  03/01/21  0034   WBC Thousand/uL 7 29  --  6 26  --  7 87  --  8 91   HEMOGLOBIN g/dL 9 0* 9 4* 7 9* 8 9* 9 3*   < > 8 5*   HEMATOCRIT % 28 6* 30 0* 25 5* 28 7* 29 4*   < > 27 2*   PLATELETS Thousands/uL 282  --  253  --  281  --  290   NEUTROS ABS Thousands/µL 4 47  --  3 27  --   --   --  4 56    < > = values in this interval not displayed           Results from last 7 days   Lab Units 03/03/21  0447 03/02/21  0517 03/01/21  0910 03/01/21  0034   SODIUM mmol/L 141 140 143 141   POTASSIUM mmol/L 3 6 3 8 4 4 3 5   CHLORIDE mmol/L 109* 109* 110* 106   CO2 mmol/L 27 26 27 26   ANION GAP mmol/L 5 5 6 9   BUN mg/dL 4* 6 5 3*   CREATININE mg/dL 0 80 0 84 0 82 0 93   EGFR ml/min/1 73sq m 90 85 87 75   CALCIUM mg/dL 8 0* 7 9* 8 0* 8 2*     Results from last 7 days   Lab Units 03/01/21  0034   AST U/L 16   ALT U/L 26   ALK PHOS U/L 44*   TOTAL PROTEIN g/dL 6 3*   ALBUMIN g/dL 3 2*   TOTAL BILIRUBIN mg/dL 0 18*         Results from last 7 days   Lab Units 03/03/21  0447 03/02/21  0517 03/01/21  0910 03/01/21  0034   GLUCOSE RANDOM mg/dL 90 91 92 97           Results from last 7 days   Lab Units 03/01/21  0034   TROPONIN I ng/mL <0 02         Results from last 7 days   Lab Units 03/01/21  0034   PROTIME seconds 14 7*   INR  1 14   PTT seconds 28     Results from last 7 days   Lab Units 03/01/21  0034   TSH 3RD GENERATON uIU/mL 5 320*         Vital Signs:   Date/Time  Temp  Pulse  Resp  BP  MAP (mmHg)  SpO2    03/03/21 0733  98 3 °F (36 8 °C)  73  18  100/59  74  97 %    03/02/21 2235  98 1 °F (36 7 °C)  74  18  96/52  --  98 %    03/02/21 1414  99 6 °F (37 6 °C)  85  17  109/64  80  97 %    03/02/21 1332  98 4 °F (36 9 °C)  81  18  118/63  --  100 %          Medications:   Scheduled Medications:  aspirin, 81 mg, Oral, Daily  atorvastatin, 40 mg, Oral, QPM  magnesium gluconate, 500 mg, Oral, Daily  medroxyPROGESTERone, 5 mg, Oral, Daily      Continuous IV Infusions:  sodium chloride, 125 mL/hr, Intravenous, Continuous      PRN Meds:none       Discharge Plan:D    Network Utilization Review Department  ATTENTION: Please call with any questions or concerns to [phone] and carefully listen to the prompts so that you are directed to the right person  All voicemails are confidential   Nadira Lyman all requests for admission clinical reviews, approved or denied determinations and any other requests to dedicated fax number below belonging to the campus where the patient is receiving treatment   List of dedicated fax numbers for the Facilities:  [address] [place] [address] DENIALS (Administrative/Medical Necessity) [phone]   [address] (Maternity/NICU/Pediatrics) 261 [facility],[address]  [address] [address] [place] [address] Gisell [address] [phone]   [place] [address] [phone]

## 2021-03-03 NOTE — TELEPHONE ENCOUNTER
3/5/21: PATIENT WAS DISCHARGED HOME  CONFIRMED BELOW APPOINTMENTS WITH HER     3/4/21: INPATIENT  3/3/21: INPATIENT    TODAYS DATE: 3/3/21  EMAIL FROM PA: Eileen Mac OF EMAIL: 3/2/21    PLAN:  FOLLOW UP WITH APPOINTMENT ALREADY SCHEDULED  W/ CTA HEAD ALSO SCHEDULED      DR Chris Palmer  4/5/21 / 1:00 / [place]    IMAGING: CTA HEAD ON 3/31/21 / Gabbi James

## 2021-03-04 VITALS
OXYGEN SATURATION: 98 % | BODY MASS INDEX: 27.47 KG/M2 | DIASTOLIC BLOOD PRESSURE: 65 MMHG | HEIGHT: 65 IN | WEIGHT: 164.9 LBS | SYSTOLIC BLOOD PRESSURE: 105 MMHG | TEMPERATURE: 98.8 F | RESPIRATION RATE: 18 BRPM | HEART RATE: 82 BPM

## 2021-03-04 LAB
ANION GAP SERPL CALCULATED.3IONS-SCNC: 5 MMOL/L (ref 4–13)
BASOPHILS # BLD AUTO: 0.05 THOUSANDS/ΜL (ref 0–0.1)
BASOPHILS NFR BLD AUTO: 1 % (ref 0–1)
BUN SERPL-MCNC: 4 MG/DL (ref 5–25)
CALCIUM SERPL-MCNC: 8.2 MG/DL (ref 8.3–10.1)
CHLORIDE SERPL-SCNC: 110 MMOL/L (ref 100–108)
CO2 SERPL-SCNC: 26 MMOL/L (ref 21–32)
CREAT SERPL-MCNC: 0.77 MG/DL (ref 0.6–1.3)
EOSINOPHIL # BLD AUTO: 0.24 THOUSAND/ΜL (ref 0–0.61)
EOSINOPHIL NFR BLD AUTO: 3 % (ref 0–6)
ERYTHROCYTE [DISTWIDTH] IN BLOOD BY AUTOMATED COUNT: 15.3 % (ref 11.6–15.1)
FOLATE SERPL-MCNC: 15.9 NG/ML (ref 3.1–17.5)
GFR SERPL CREATININE-BSD FRML MDRD: 94 ML/MIN/1.73SQ M
GLUCOSE SERPL-MCNC: 86 MG/DL (ref 65–140)
HCT VFR BLD AUTO: 28 % (ref 34.8–46.1)
HCT VFR BLD AUTO: 29.6 % (ref 34.8–46.1)
HGB BLD-MCNC: 8.7 G/DL (ref 11.5–15.4)
HGB BLD-MCNC: 9.4 G/DL (ref 11.5–15.4)
IMM GRANULOCYTES # BLD AUTO: 0.02 THOUSAND/UL (ref 0–0.2)
IMM GRANULOCYTES NFR BLD AUTO: 0 % (ref 0–2)
LYMPHOCYTES # BLD AUTO: 2.72 THOUSANDS/ΜL (ref 0.6–4.47)
LYMPHOCYTES NFR BLD AUTO: 36 % (ref 14–44)
MCH RBC QN AUTO: 26.8 PG (ref 26.8–34.3)
MCHC RBC AUTO-ENTMCNC: 31.1 G/DL (ref 31.4–37.4)
MCV RBC AUTO: 86 FL (ref 82–98)
MONOCYTES # BLD AUTO: 0.71 THOUSAND/ΜL (ref 0.17–1.22)
MONOCYTES NFR BLD AUTO: 9 % (ref 4–12)
NEUTROPHILS # BLD AUTO: 3.88 THOUSANDS/ΜL (ref 1.85–7.62)
NEUTS SEG NFR BLD AUTO: 51 % (ref 43–75)
NRBC BLD AUTO-RTO: 0 /100 WBCS
PLATELET # BLD AUTO: 270 THOUSANDS/UL (ref 149–390)
PMV BLD AUTO: 11.1 FL (ref 8.9–12.7)
POTASSIUM SERPL-SCNC: 3.7 MMOL/L (ref 3.5–5.3)
RBC # BLD AUTO: 3.25 MILLION/UL (ref 3.81–5.12)
SODIUM SERPL-SCNC: 141 MMOL/L (ref 136–145)
WBC # BLD AUTO: 7.62 THOUSAND/UL (ref 4.31–10.16)

## 2021-03-04 PROCEDURE — 85018 HEMOGLOBIN: CPT | Performed by: FAMILY MEDICINE

## 2021-03-04 PROCEDURE — 80048 BASIC METABOLIC PNL TOTAL CA: CPT | Performed by: FAMILY MEDICINE

## 2021-03-04 PROCEDURE — 85025 COMPLETE CBC W/AUTO DIFF WBC: CPT | Performed by: FAMILY MEDICINE

## 2021-03-04 PROCEDURE — 82746 ASSAY OF FOLIC ACID SERUM: CPT | Performed by: FAMILY MEDICINE

## 2021-03-04 PROCEDURE — 99238 HOSP IP/OBS DSCHRG MGMT 30/<: CPT | Performed by: FAMILY MEDICINE

## 2021-03-04 PROCEDURE — 85014 HEMATOCRIT: CPT | Performed by: FAMILY MEDICINE

## 2021-03-04 RX ORDER — MEDROXYPROGESTERONE ACETATE 5 MG/1
5 TABLET ORAL DAILY
Qty: 1 TABLET | Refills: 0 | Status: SHIPPED | OUTPATIENT
Start: 2021-03-05 | End: 2021-04-05

## 2021-03-04 RX ADMIN — MEDROXYPROGESTERONE ACETATE 5 MG: 2.5 TABLET ORAL at 08:10

## 2021-03-04 RX ADMIN — Medication 500 MG: at 08:10

## 2021-03-04 RX ADMIN — ASPIRIN 81 MG: 81 TABLET, COATED ORAL at 08:10

## 2021-03-04 RX ADMIN — SODIUM CHLORIDE 125 ML/HR: 0.9 INJECTION, SOLUTION INTRAVENOUS at 05:26

## 2021-03-04 NOTE — PLAN OF CARE
Problem: Potential for Falls  Goal: Patient will remain free of falls  Description: INTERVENTIONS:  - Assess patient frequently for physical needs  -  Identify cognitive and physical deficits and behaviors that affect risk of falls  -  [place] fall precautions as indicated by assessment   - Educate patient/family on patient safety including physical limitations  - Instruct patient to call for assistance with activity based on assessment  - Modify environment to reduce risk of injury  - Consider OT/PT consult to assist with strengthening/mobility  Outcome: Progressing     Problem: SAFETY ADULT  Goal: Patient will remain free of falls  Description: INTERVENTIONS:  - Assess patient frequently for physical needs  -  Identify cognitive and physical deficits and behaviors that affect risk of falls    -  [place] fall precautions as indicated by assessment   - Educate patient/family on patient safety including physical limitations  - Instruct patient to call for assistance with activity based on assessment  - Modify environment to reduce risk of injury  - Consider OT/PT consult to assist with strengthening/mobility  Outcome: Progressing  Goal: Maintain or return to baseline ADL function  Description: INTERVENTIONS:  -  Assess patient's ability to carry out ADLs; assess patient's baseline for ADL function and identify physical deficits which impact ability to perform ADLs (bathing, care of mouth/teeth, toileting, grooming, dressing, etc )  - Assess/evaluate cause of self-care deficits   - Assess range of motion  - Assess patient's mobility; develop plan if impaired  - Assess patient's need for assistive devices and provide as appropriate  - Encourage maximum independence but intervene and supervise when necessary  - Involve family in performance of ADLs  - Assess for home care needs following discharge   - Consider OT consult to assist with ADL evaluation and planning for discharge  - Provide patient education as appropriate  Outcome: Progressing  Goal: Maintain or return mobility status to optimal level  Description: INTERVENTIONS:  - Assess patient's baseline mobility status (ambulation, transfers, stairs, etc )    - Identify cognitive and physical deficits and behaviors that affect mobility  - Identify mobility aids required to assist with transfers and/or ambulation (gait belt, sit-to-stand, lift, walker, cane, etc )  - [place] fall precautions as indicated by assessment  - Record patient progress and toleration of activity level on Mobility SBAR; progress patient to next Phase/Stage  - Instruct patient to call for assistance with activity based on assessment  - Consider rehabilitation consult to assist with strengthening/weightbearing, etc   Outcome: Progressing     Problem: DISCHARGE PLANNING  Goal: Discharge to home or other facility with appropriate resources  Description: INTERVENTIONS:  - Identify barriers to discharge w/patient and caregiver  - Arrange for needed discharge resources and transportation as appropriate  - Identify discharge learning needs (meds, wound care, etc )  - Arrange for interpretive services to assist at discharge as needed  - Refer to Case Management Department for coordinating discharge planning if the patient needs post-hospital services based on physician/advanced practitioner order or complex needs related to functional status, cognitive ability, or social support system  Outcome: Progressing     Problem: HEMATOLOGIC - ADULT  Goal: Maintains hematologic stability  Description: INTERVENTIONS  - Assess for signs and symptoms of bleeding or hemorrhage  - Monitor labs  - Administer supportive blood products/factors as ordered and appropriate  Outcome: Progressing

## 2021-03-04 NOTE — UTILIZATION REVIEW
Continued Stay Review    Date: 3/5/21                        Current Patient Class: IP Current Level of Care: MS    HPI:45 y o  female initially admitted on 3/1 as OBS converted to IP 3/3/21 with vaginal bleeding, symptomatic anemia   Pt has hx carotid dissection and was on xarelto until  3/3/21,when given ok to dc by neurosurgery - CTA 3/2  showed carotid dissection resolved  Pt started on Provera as temporary measure byOB/GYN and was transfused with 1 U PRBC's 3/1 and 1 UPRBC's 3/2 with monitoring of H/H, IVF    Assessment/Plan: 3/4/21  Hgb stable, pt concerned over slightly low hgb today, discussed with pt regarding hemodilution due to IVF for symptomatic anemia  IVF stopped this am and will recheck H/H later today  Pt has stopped vaginal bleeding and is stable for d/c  Pt will continue on ASA daily    Pertinent Labs/Diagnostic Results:       Results from last 7 days   Lab Units 03/04/21  1408 03/04/21  0519 03/03/21  1747 03/03/21  0447 03/02/21  1434 03/02/21  0517   WBC Thousand/uL  --  7 62  --  7 29  --  6 26   HEMOGLOBIN g/dL 9 4* 8 7* 9 6* 9 0* 9 4* 7 9*   HEMATOCRIT % 29 6* 28 0* 30 8* 28 6* 30 0* 25 5*   PLATELETS Thousands/uL  --  270  --  282  --  253   NEUTROS ABS Thousands/µL  --  3 88  --  4 47  --  3 27         Results from last 7 days   Lab Units 03/04/21  0519 03/03/21  0447 03/02/21  0517 03/01/21  0910 03/01/21  0034   SODIUM mmol/L 141 141 140 143 141   POTASSIUM mmol/L 3 7 3 6 3 8 4 4 3 5   CHLORIDE mmol/L 110* 109* 109* 110* 106   CO2 mmol/L 26 27 26 27 26   ANION GAP mmol/L 5 5 5 6 9   BUN mg/dL 4* 4* 6 5 3*   CREATININE mg/dL 0 77 0 80 0 84 0 82 0 93   EGFR ml/min/1 73sq m 94 90 85 87 75   CALCIUM mg/dL 8 2* 8 0* 7 9* 8 0* 8 2*     Results from last 7 days   Lab Units 03/01/21  0034   AST U/L 16   ALT U/L 26   ALK PHOS U/L 44*   TOTAL PROTEIN g/dL 6 3*   ALBUMIN g/dL 3 2*   TOTAL BILIRUBIN mg/dL 0 18*         Results from last 7 days   Lab Units 03/04/21  0519 03/03/21  0447 03/02/21  0517 03/01/21  0910 03/01/21  0034   GLUCOSE RANDOM mg/dL 86 90 91 92 97          Results from last 7 days   Lab Units 03/01/21  0034   TROPONIN I ng/mL <0 02         Results from last 7 days   Lab Units 03/01/21  0034   PROTIME seconds 14 7*   INR  1 14   PTT seconds 28     Results from last 7 days   Lab Units 03/01/21  0034   TSH 3RD GENERATON uIU/mL 5 320*             Vital Signs:   Date/Time  Temp  Pulse  Resp  BP  MAP (mmHg)  SpO2    03/04/21 0800  --  --  --  --  --  --    03/04/21 0755  98 8 °F (37 1 °C)  82  18  105/65  --  98 %    03/04/21 0130  --  --  --  --  --  --    03/03/21 2242  --  --  --  100/62  --  --    03/03/21 2241  98 1 °F (36 7 °C)  74  18  98/56  --  95 %    03/03/21 1536  98 2 °F (36 8 °C)  79  18  103/55  --  98 %          Medications:   Scheduled Medications:  aspirin, 81 mg, Oral, Daily  atorvastatin, 40 mg, Oral, QPM  magnesium gluconate, 500 mg, Oral, Daily  medroxyPROGESTERone, 5 mg, Oral, Daily      Continuous IV Infusions:sodium chloride 0 9 % infusion   Rate: 125 mL/hr Dose: 125 mL/hr  Freq: Continuous Route: IV  Indications of Use: IV Hydration  Last Dose: Stopped (03/04/21 1107)     PRN Meds:none       Discharge Plan: TBD  Network Utilization Review Department  ATTENTION: Please call with any questions or concerns to [phone] and carefully listen to the prompts so that you are directed to the right person  All voicemails are confidential   Finis Feeling all requests for admission clinical reviews, approved or denied determinations and any other requests to dedicated fax number below belonging to the campus where the patient is receiving treatment   List of dedicated fax numbers for the Facilities:  [address] DENIALS (Administrative/Medical Necessity) [phone]   [address] (Maternity/NICU/Pediatrics) [phone] [address] [place] [address] 8273 [facility] Carole Guillermo [phone]   [facility] Vijay Toro [address] ([address]) [address] [place] [address] Gisell Price 1481 [phone]   [place] [address] [phone]

## 2021-03-05 ENCOUNTER — APPOINTMENT (OUTPATIENT)
Dept: PHYSICAL THERAPY | Facility: CLINIC | Age: 46
End: 2021-03-05
Payer: COMMERCIAL

## 2021-03-05 ENCOUNTER — APPOINTMENT (OUTPATIENT)
Dept: OCCUPATIONAL THERAPY | Facility: CLINIC | Age: 46
End: 2021-03-05
Payer: COMMERCIAL

## 2021-03-05 ENCOUNTER — TELEPHONE (OUTPATIENT)
Dept: NEUROSURGERY | Facility: CLINIC | Age: 46
End: 2021-03-05

## 2021-03-05 NOTE — TELEPHONE ENCOUNTER
Patient called stating that she just wanted to make sure that Dr Wood Villatoro was okay with her remaining off of the xarelto? I sent TT to Dr Wood Villatoro who said that yes, patient may remain off of xarelto and can continue asa, which she has been taking

## 2021-03-09 ENCOUNTER — APPOINTMENT (OUTPATIENT)
Dept: OCCUPATIONAL THERAPY | Facility: CLINIC | Age: 46
End: 2021-03-09
Payer: COMMERCIAL

## 2021-03-09 ENCOUNTER — APPOINTMENT (OUTPATIENT)
Dept: PHYSICAL THERAPY | Facility: CLINIC | Age: 46
End: 2021-03-09
Payer: COMMERCIAL

## 2021-03-10 ENCOUNTER — DOCUMENTATION (OUTPATIENT)
Dept: NEUROSURGERY | Facility: CLINIC | Age: 46
End: 2021-03-10

## 2021-03-10 NOTE — PROGRESS NOTES
I went to start auth for ctahead and neck we ordered for march - pt went to [facility] in feb and Dr Mark Yeung ordered the same tests for march and got approved - I called pt she didn't have them done - I explained to pt active [facility] onfile for dr Yung Archuleta she will get his done and bring cd disc from [facility] and cx the one we scheduled for march _BA

## 2021-03-11 NOTE — UTILIZATION REVIEW
Initial Clinical Review    Admission: Date/Time/Statement:   Admission Orders (From admission, onward)     Ordered        03/03/21 1200  Inpatient Admission  Once         03/01/21 0216  Place in Observation  Once                   Orders Placed This Encounter   Procedures    Place in Observation     Telemetry     Standing Status:   Standing     Number of Occurrences:   1     Order Specific Question:   Level of Care     Answer:   Med Surg [16]     Order Specific Question:   Bed request comments     Answer:   telemetry    Inpatient Admission     Standing Status:   Standing     Number of Occurrences:   1     Order Specific Question:   Level of Care     Answer:   Med Surg [16]     Order Specific Question:   Estimated length of stay     Answer:   More than 2 Midnights     Order Specific Question:   Certification     Answer:   I certify that inpatient services are medically necessary for this patient for a duration of greater than two midnights  See H&P and MD Progress Notes for additional information about the patient's course of treatment  ED Arrival Information     Expected Arrival Acuity Means of Arrival Escorted By Service Admission Type    - 3/1/2021 00:22 Urgent Ambulance [facility] General Medicine Urgent    Arrival Complaint    -        Chief Complaint   Patient presents with    Vaginal Bleeding     PT presents to ED via EMS from home after having vaginal bleeding for 8 days  Pt on several blood thinners  VSS  Assessment/Plan: 40 yo female with hx of  Ischemic CVA, internal carotid artery dissection on Xarelto , iron deficiency anemia,hysteroscopy/polypectomy 05/2020 presents to ED via ambulance with lightheadedness  Pt started menses 8 days ago and since started xarelto in 12/2020, her menses is heavy with change of overnight pads q1h  Menses usually lasts 5 days with 1 heavy day, this month longer with passing clots  Today with position change sit to stand became lightheaded and presyncopal   On exam, pt tachycardic, skin is pale  Labs show low Hgb  Hgb was 13 4 in dec 2020  OB/GYN saw pt in ED  Pt admitted as OBS to telemetry with vaginal bleeding and anemia  Plan includes OB/GYN consult, transfuse 1 U PRBC's, monitor H/H, start Provera for 5-10 days per GYN recommendation, outpt f/u for long term options with GYN,continue xarelto, possible referral to Hem/Onc for IV iron if pt intolerant of oral Pt takes liquid iron supplement at home - Floradix  OB/GYN-pt ordered Provera to control menses , other options discussed for long term and pt leading toward endometrial ablation with pt to f/u with her GYN as outpt  3/2  Pt continued with bleeding and some clots  Plan to transfuse with 1 U PRBC's today as H/H dropping  , will repeat Hgb 3/3  Looks like carotid dissection on CTA has resolved  Pt was told to be on AC x 3 months with this being the first week of her 3rd month  Consult placed to neurology  Would ideally like to keep pt on xarelto for now, if bleeding continues to be an issue , may need to consider Coumadin  Neurosurgery-Ok to d/c xarelto ans ASA at this time, once pt stable can restart ASA 81 mg daily  No acute neurosurgery needs at this time, will continue to follow    ED Triage Vitals   Temperature Pulse Respirations Blood Pressure SpO2   03/01/21 0025 03/01/21 0025 03/01/21 0025 03/01/21 0025 03/01/21 0025   98 °F (36 7 °C) 102 16 125/68 100 %      Temp Source Heart Rate Source Patient Position - Orthostatic VS BP Location FiO2 (%)   03/01/21 0025 03/01/21 0330 03/01/21 0330 03/01/21 0330 --   Oral Monitor Lying Left arm       Pain Score       03/02/21 0420       1          Wt Readings from Last 1 Encounters:   03/01/21 74 8 kg (164 lb 14 5 oz)     Additional Vital Signs:   Date/Time  Temp  Pulse  Resp  BP  MAP (mmHg)  SpO2    03/02/21 0707  98 5 °F (36 9 °C)  65  16  109/65  81  99 %    03/02/21 0300  --  80  17  120/70  --  100 %    03/01/21 2015  98 9 °F (37 2 °C)  82  16  111/69  --  100 % 03/01/21 1630  --  74  17  104/61  78  98 %    03/01/21 1450  --  73  16  93/59  --  99 %    03/01/21 1200  --  74  18  110/64  82  99 %    03/01/21 1100  --  76  --  102/64  77  99 %    03/01/21 1030  --  82  18  102/70  81  100 %    03/01/21 0915  --  76  18  104/68  81  99 %    03/01/21 0800  --  60  16  93/54  69  97 %    03/01/21 0700  --  62  17  102/54  74  97 %    03/01/21 0630  --  64  16  97/55  73  95 %    03/01/21 0530  --  72  17  107/61  78  99 %    03/01/21 0500  98 6 °F (37 °C)  78  16  109/62  80  99 %    03/01/21 0455  --  --  --  --  --  96 %    03/01/21 0445  --  70  16  106/55  75  95 %    03/01/21 0415  98 8 °F (37 1 °C)  76  18  107/57  77  97 %    03/01/21 0400  98 4 °F (36 9 °C)  84  16  107/59  79  98 %    03/01/21 0345  98 3 °F (36 8 °C)  78  17  109/62  --  97 %    03/01/21 0330  --  86  16  107/63  --  99 %    03/01/21 0326  98 1 °F (36 7 °C)  106Abnormal   18  100/61  --  --        Pertinent Labs/Diagnostic Test Results:   3/1 CTA head and neck-  3/1 CXR  No acute cardiopulmonary disease    3/1 ECG  Normal sinus rhythm  Cannot rule out Anterior infarct , age undetermined  ST & T wave abnormality, consider inferior ischemia  Abnormal ECG                                      No results found for: BETA-HYDROXYBUTYRATE                                                                                                                         ED Treatment:   Medication Administration from 03/01/2021 0022 to 03/02/2021 0815       Date/Time Order Dose Route Action Action by Comments     03/01/2021 0137 sodium chloride 0 9 % bolus 1,000 mL 0 mL Intravenous Stopped Shilo Cohen RN      03/01/2021 0035 sodium chloride 0 9 % bolus 1,000 mL 1,000 mL Intravenous [address] Shilo Cohen RN      03/02/2021 0524 sodium chloride 0 9 % infusion 125 mL/hr Intravenous [facility] KIRAN Cabrera      03/02/2021 0248 sodium chloride 0 9 % infusion 0 mL/hr Intravenous Stopped [employer], RN      03/01/2021 0126 sodium chloride 0 9 % infusion 125 mL/hr Intravenous [address] iKm Jones RN      03/01/2021 0049 ondansetron (ZOFRAN) injection 4 mg 4 mg Intravenous Given Kim Jones RN      03/02/2021 6419 aspirin (ECOTRIN LOW STRENGTH) EC tablet 81 mg 81 mg Oral Given Hillary Adams RN      03/01/2021 0910 aspirin (ECOTRIN LOW STRENGTH) EC tablet 81 mg 81 mg Oral Given Hillary Adams RN      03/01/2021 1754 atorvastatin (LIPITOR) tablet 40 mg 40 mg Oral Given Stephanie Arteaga RN      03/02/2021 0453 magnesium gluconate (MAGONATE) tablet 500 mg 500 mg Oral Given Hillary Adams RN      03/01/2021 0950 magnesium gluconate (MAGONATE) tablet 500 mg 500 mg Oral Given Hillary Adams RN      03/02/2021 7201 rivaroxaban (XARELTO) tablet 15 mg 15 mg Oral Given Hillary Adams RN      03/02/2021 6828 rivaroxaban (XARELTO) tablet 15 mg 0 mg Oral Hold Chrissy Cabrera RN Patient wants to take at 0800     03/01/2021 0910 rivaroxaban (XARELTO) tablet 15 mg 15 mg Oral Given Hillary Adams RN      03/02/2021 6819 medroxyPROGESTERone (PROVERA) tablet 5 mg 5 mg Oral Given Hillary Adams RN      03/01/2021 0704 medroxyPROGESTERone (PROVERA) tablet 5 mg 5 mg Oral Given Hillary Adams RN      03/01/2021 0455 ALPRAZolam Oskar [place]) tablet 0 5 mg 0 5 mg Oral Given Hillary Adams RN      03/01/2021 1923 iohexol (OMNIPAQUE) 350 MG/ML injection (SINGLE-DOSE) 100 mL 85 mL Intravenous Given Loyda Berg         Past Medical History:   Diagnosis Date    Asthma     ACTIVITY INDUCED ASTHMA    IBS (irritable bowel syndrome)      Present on Admission:   Internal carotid artery dissection (HCC)   Anemia   Ischemic stroke ([facility] [address] )      Admitting Diagnosis: Menorrhagia [N92 0]  Vaginal bleeding [N93 9]  Anemia [D64 9]  Near syncope [R55]  Raised TSH level [R79 89]  Internal carotid artery dissection (HCC) [I77 71]  Anticoagulated by anticoagulation treatment [Z79 01]  Ischemic stroke ([facility] [address] ) [I63 9]  Age/Sex: 41 y  o  female  Admission Orders:  Scheduled Medications:  No current facility-administered medications for this encounter  Continuous IV Infusions:  No current facility-administered medications for this encounter  PRN Meds:none  No current facility-administered medications for this encounter  SCD's     IP CONSULT TO OB GYN  IP CONSULT TO NEUROSURGERY    Network Utilization Review Department  ATTENTION: Please call with any questions or concerns to [phone] and carefully listen to the prompts so that you are directed to the right person  All voicemails are confidential   Brock Cardoza all requests for admission clinical reviews, approved or denied determinations and any other requests to dedicated fax number below belonging to the campus where the patient is receiving treatment   List of dedicated fax numbers for the Facilities:  [address] DENIALS (Administrative/Medical Necessity) [phone]   [address] (Maternity/NICU/Pediatrics) [phone]   [address] [place] [address] Dr Mame Toro [address]26 (  Swati Paz "[address]) [address] [address] Blaise Kelly 1481 [address] [place]) [address] [place] [address] [phone]

## 2021-03-12 ENCOUNTER — APPOINTMENT (OUTPATIENT)
Dept: PHYSICAL THERAPY | Facility: CLINIC | Age: 46
End: 2021-03-12
Payer: COMMERCIAL

## 2021-03-12 ENCOUNTER — APPOINTMENT (OUTPATIENT)
Dept: OCCUPATIONAL THERAPY | Facility: CLINIC | Age: 46
End: 2021-03-12
Payer: COMMERCIAL

## 2021-03-15 ENCOUNTER — TELEPHONE (OUTPATIENT)
Dept: HEMATOLOGY ONCOLOGY | Facility: CLINIC | Age: 46
End: 2021-03-15

## 2021-03-15 NOTE — TELEPHONE ENCOUNTER
Dr Obregon Sessions office calling to find out what type of iron patient received  I do not see that this patient was seen by our practice  I was able to look at the previous medication from infusion which noted Venofer 12/29/2020    Dr Obregon Sessions office thought they were calling to speak with the infusion center directly    I provided them with the number incase they need it in the future

## 2021-03-16 ENCOUNTER — OFFICE VISIT (OUTPATIENT)
Dept: PHYSICAL THERAPY | Facility: CLINIC | Age: 46
End: 2021-03-16
Payer: COMMERCIAL

## 2021-03-16 ENCOUNTER — OFFICE VISIT (OUTPATIENT)
Dept: OCCUPATIONAL THERAPY | Facility: CLINIC | Age: 46
End: 2021-03-16
Payer: COMMERCIAL

## 2021-03-16 DIAGNOSIS — I77.71 DISSECTION OF CAROTID ARTERY (HCC): Primary | ICD-10-CM

## 2021-03-16 PROCEDURE — 97112 NEUROMUSCULAR REEDUCATION: CPT | Performed by: PHYSICAL THERAPIST

## 2021-03-16 PROCEDURE — 97530 THERAPEUTIC ACTIVITIES: CPT

## 2021-03-16 NOTE — PROGRESS NOTES
Daily Note     Today's date: 3/16/2021  Patient name: Marni Magana  : 1975  MRN: [id number]  Referring provider: Sigifredo Wright MD  Dx:   Encounter Diagnosis     ICD-10-CM    1  Dissection of carotid artery (HCC)  I77 71                   Subjective: Patient was hospitalized due to blood loss, low Hb and low iron  She continues to hav esignificantly low Hb and iron  She is to receive infusions 3x over the next 2 weeks  Objective: See treatment diary below    FGA -     Assessment: Completed balance assessment this session but held on all others due to significant fatigue and low Hb  Focused session on gentle balance exercise  Pt will benefit from continued skilled therapy intervention with continued goals below  STG:  Pt will complete 6 mwt in 1200 ft or more within 4 weeks - met  Pt will improve FGA to  within 4 weeks - met  Pt will be participating in home program regularly wihtin 4 weeks - met  Pt will be able to tolerate vestibular exercise with minimal dizziness within 4 weeks - met    LTG  Pt will be able to return to yoga on a regular basis within 8 weeks - cntinued  Pt will be able to go to grocery store and walk around with minimal symptoms wihtn 8 weeks - continued    Plan: Continue per plan of care        Precautions: Carotid occlusion      Manuals 2/24 2/26  3/16                                                             Neuro Re-Ed             Side stepping  3 laps ht/hn  Foam beam  3 laps ht/hn foam         Step taps  Foam beams tandem walking   3 laps  Foam 3 laps tandem         Tandem ht/hn 15 ft x 8 Ht/hn 40'x 2           VOR x 1 45 sec x 2 ea            bakcwards  HT/HN  // bars  3 laps            SLS    30 sec x 3         Rocker board              Ht/hn hallway 40'x 2            bosu ball             VR             Tandem stance    30 sec x 3         Static balance    Foam - 30 sec EC x3         Ther Ex             STS             gastroc stretch             Mini squtas on faom             treadmill  5 min warm up   1  3mph    REP        1  7mph x 3 min     3 min recovery 1 4 mph     3 min 1  8mph     cooldown   2 min recovery 1 4 mph                                                                           Ther Activity                                       Gait Training                                       Modalities

## 2021-03-16 NOTE — PROGRESS NOTES
Occupational Therapy Daily Note:    Today's date: 3/16/2021  Patient name: Mariposa Quiroz  : 1975  MRN: [id number]  Referring provider: Maryann Wells MD  Dx:   Encounter Diagnosis   Name Primary?  Dissection of carotid artery (HCC) Yes                  Subjective: "I am just so tired"    Objective: Pt seen for OT treatment session focusing on /VM skills, working memory, and susatined visual and cognitive attention  Pt engaged in [employer] worksheet to address multidirectional saccades, working memory/mental manipulation and near to far point accommodation  Pt completed with >75% accuracy, cue to locate error, pt able to self correct  Pt engaged in spot the difference completed in horizontal and vertical planes to address sustained visual attention, multiidrectional saccades, and visual tolerance in cluttered field  Pt req inc time to complete with >75% completed indep        Assessment: Tolerated treatment well  Pt with recent medical decline with anemia/blood loss and drop in iron/hemoglobin levels  Pt with dec endurance and tolerance overall following medical change  Will continue to monitor activity tolerance and endurance with medical changes  Patient would benefit from continued skilled OT  Plan: Continued skilled OT per POC      INTERVENTION COMMENTS:  Diagnosis: Dissection of carotid artery ([facility] [address] ) [I77 71]  Precautions: NO OVERHEAD LIFTING, no lifting >10 lbs  FOTO: n/a  Insurance: Payor: Veto Huger / Plan: Veto Huger / Product Type: Medicaid [facility] Ed Overlie  98 OG 64 KUSHAL SANDS due 3/27

## 2021-03-19 ENCOUNTER — APPOINTMENT (OUTPATIENT)
Dept: PHYSICAL THERAPY | Facility: CLINIC | Age: 46
End: 2021-03-19
Payer: COMMERCIAL

## 2021-03-19 ENCOUNTER — APPOINTMENT (OUTPATIENT)
Dept: OCCUPATIONAL THERAPY | Facility: CLINIC | Age: 46
End: 2021-03-19
Payer: COMMERCIAL

## 2021-03-19 ENCOUNTER — DOCUMENTATION (OUTPATIENT)
Dept: NEUROSURGERY | Facility: CLINIC | Age: 46
End: 2021-03-19

## 2021-03-19 NOTE — PROGRESS NOTES
Pt called states she marielle had a ctahed and neck in hosp in 3/1/2021 does she really need another one ?  Discussed with alinia no 3/1/2021 is ok to use - pt will cx 3/3/44488 _BA

## 2021-03-24 ENCOUNTER — APPOINTMENT (OUTPATIENT)
Dept: OCCUPATIONAL THERAPY | Facility: CLINIC | Age: 46
End: 2021-03-24
Payer: COMMERCIAL

## 2021-03-24 ENCOUNTER — APPOINTMENT (OUTPATIENT)
Dept: PHYSICAL THERAPY | Facility: CLINIC | Age: 46
End: 2021-03-24
Payer: COMMERCIAL

## 2021-03-26 ENCOUNTER — APPOINTMENT (OUTPATIENT)
Dept: PHYSICAL THERAPY | Facility: CLINIC | Age: 46
End: 2021-03-26
Payer: COMMERCIAL

## 2021-03-26 ENCOUNTER — APPOINTMENT (OUTPATIENT)
Dept: OCCUPATIONAL THERAPY | Facility: CLINIC | Age: 46
End: 2021-03-26
Payer: COMMERCIAL

## 2021-03-30 ENCOUNTER — OFFICE VISIT (OUTPATIENT)
Dept: PHYSICAL THERAPY | Facility: CLINIC | Age: 46
End: 2021-03-30
Payer: COMMERCIAL

## 2021-03-30 ENCOUNTER — OFFICE VISIT (OUTPATIENT)
Dept: OCCUPATIONAL THERAPY | Facility: CLINIC | Age: 46
End: 2021-03-30
Payer: COMMERCIAL

## 2021-03-30 DIAGNOSIS — I77.71 DISSECTION OF CAROTID ARTERY (HCC): Primary | ICD-10-CM

## 2021-03-30 PROCEDURE — 97110 THERAPEUTIC EXERCISES: CPT | Performed by: PHYSICAL THERAPIST

## 2021-03-30 PROCEDURE — 97530 THERAPEUTIC ACTIVITIES: CPT

## 2021-03-30 PROCEDURE — 97150 GROUP THERAPEUTIC PROCEDURES: CPT

## 2021-03-30 PROCEDURE — 97112 NEUROMUSCULAR REEDUCATION: CPT | Performed by: PHYSICAL THERAPIST

## 2021-03-30 PROCEDURE — 97112 NEUROMUSCULAR REEDUCATION: CPT

## 2021-03-30 NOTE — PROGRESS NOTES
Occupational Therapy Daily Note:    Today's date: 3/30/2021  Patient name: Akin Rueda  : 1975  MRN: [id number]  Referring provider: Levada Schirmer, MD  Dx:   Encounter Diagnosis   Name Primary?  Dissection of carotid artery ([facility] ) Yes              Time: 8748-2686 one on one   5240-7990 group   1775-0754: unsup    Subjective: "im just having a hard time with my endurance and concentration"    Objective: Pt seen for OT treatment session focusing on /VM skills, working memory/sustained attention, cognitive organization with /VM skills  Pt tolerated 10 min monocular and b/l peripheral field taping to engage in number search on yellow paper for improved visual tolerance, endurance, and accuracy with multidirectional saccades and accommodation  OT positioned number sequences on slant board,     Pt engaged in IQ fit for sustained attention/concentration and visual problem solving  Pt completed deductive reasoning puzzle to address cognitive organization, attention and working memory with task /clues positioned on slant board and tabletop    Assessment: Tolerated treatment well  Pt demo cognitive and visual fatigue following tx session today  Patient would benefit from continued skilled OT  Plan: Continued skilled OT per POC      INTERVENTION COMMENTS:  Diagnosis: Dissection of carotid artery ([facility] ) [I77 71]  Precautions: NO OVERHEAD LIFTING, no lifting >10 lbs  FOTO: n/a  Insurance: Payor: Maverick Kate / Plan: Maverick Kate / Product Type: Medicaid O Trevor Mejiand  02 NC 53 CRYSTAL PN due 3/27

## 2021-03-31 ENCOUNTER — OFFICE VISIT (OUTPATIENT)
Dept: PHYSICAL THERAPY | Facility: CLINIC | Age: 46
End: 2021-03-31
Payer: COMMERCIAL

## 2021-03-31 ENCOUNTER — OFFICE VISIT (OUTPATIENT)
Dept: OCCUPATIONAL THERAPY | Facility: CLINIC | Age: 46
End: 2021-03-31
Payer: COMMERCIAL

## 2021-03-31 DIAGNOSIS — I77.71 DISSECTION OF CAROTID ARTERY (HCC): Primary | ICD-10-CM

## 2021-03-31 PROCEDURE — 97110 THERAPEUTIC EXERCISES: CPT | Performed by: PHYSICAL THERAPIST

## 2021-03-31 PROCEDURE — 97535 SELF CARE MNGMENT TRAINING: CPT

## 2021-03-31 PROCEDURE — 97112 NEUROMUSCULAR REEDUCATION: CPT

## 2021-03-31 PROCEDURE — 97112 NEUROMUSCULAR REEDUCATION: CPT | Performed by: PHYSICAL THERAPIST

## 2021-03-31 PROCEDURE — 97530 THERAPEUTIC ACTIVITIES: CPT

## 2021-03-31 NOTE — PROGRESS NOTES
Daily Note     Today's date: 3/31/2021  Patient name: Deanna Tierney  : 1975  MRN: [id number]  Referring provider: Benigno John MD  Dx:   Encounter Diagnosis     ICD-10-CM    1  Dissection of carotid artery (HCC)  I77 71                   Subjective: Was tired after leaving yesterday but recovered quicly and feels fine today  Objective: See treatment diary below    Assessment: Ended session 15 min early as patient was significantly fatigued  Added more strengthening this session compared to previous  Plan: Continue per plan of care  Precautions: Carotid occlusion      Manuals 2/24 2/26  3/16  3/30  3/31                                                         Neuro Re-Ed             Side stepping  3 laps ht/hn  Foam beam  3 laps ht/hn foam    OTB 3 laps //bars with knee flex     Step taps  Foam beams tandem walking   3 laps  Foam 3 laps tandem    Step ups 15x ea fwd/lat 8"     Tandem ht/hn 15 ft x 8 Ht/hn 40'x 2           VOR x 1 45 sec x 2 ea            bakcwards  HT/HN  // bars  3 laps            SLS    30 sec x 3         Rocker board              Ht/hn hallway 40'x 2            bosu ball        10x mini squat     VR             Tandem stance    30 sec x 3         Static balance    Foam - 30 sec EC x3         Ther Ex             STS             gastroc stretch             Mini squtas on faom             treadmill  5 min warm up   1  3mph    REP        1  7mph x 3 min     3 min recovery 1 4 mph     3 min 1  8mph     cooldown   2 min recovery 1 4 mph                5 min warm up   1  8mph    REP        2 4 mph x 3 min     3 min recovery 1 8 mph     3 min  2  4mph     cooldown   2 min recovery 1 8 mph    5 min warm up   1  8mph    REP        2 4 mph x 3 min     3 min recovery 1 8 mph     3 min  2  4mph     cooldown   2 min recovery 1 8 mph                                                         Ther Activity                                       Gait Training Modalities

## 2021-03-31 NOTE — PROGRESS NOTES
Occupational Therapy Daily Note:    Today's date: 3/31/2021  Patient name: Yu Prieto  : 1975  MRN: [id number]  Referring provider: Sonal Bruner MD  Dx:   Encounter Diagnosis   Name Primary?  Dissection of carotid artery (HCC) Yes            Pt was treated by EVELIA Jimenez and treatment was supervised and documentation was reviewed by Anabela ALTAMIRANO/L       Subjective: "My eyes feel tired after the taping"    Objective: Pt seen for OT treatment session focusing on susatined visual and cognitive attention and VM/ skills to inc engagement in ADL/IADLs  Pt tolerated 10 min monocular and b/l peripheral field taping to engage in Joss's activity with iSpy card placed on tabletop in central field of vision and item card on slant board to focus on near/far saccadic movements w/board to table task  Pt engaged in [place] wheel simultaneously w/ball throw w/cog load to focus on pursuits, screen tolerance and divided attention  Pt instructed to catch ball and identify first letter w/word beginning w/that letter  Alternating w/ball catch, pt completed first trial of 35 numbers in reverse sequence w/90% accuracy  Activity upgraded and pt completed trailing task improving by third trial to 40 letters and numbers w/central fixation w/84% accuracy and 5 ball drops throughout all trials  Assessment: Tolerated treatment well  Pt demo min increase in symptoms as evidenced by no c/o HA throughout tx  Pt demo visual fatigue at end of session and would benefit from continued skilled OT to focus on oculomotor re-ed  Plan: Continued skilled OT per POC      INTERVENTION COMMENTS:  Diagnosis: Dissection of carotid artery ([facility] [address] ) [I77 71]  Precautions: NO OVERHEAD LIFTING, no lifting >10 lbs  FOTO: n/a  Insurance: Payor: Ace Kins / Plan: Ace Kins / Product Type: Medicaid HMO Shelby Roof  79 CI 13 ZCYSJR, PN due 3/27

## 2021-03-31 NOTE — PROGRESS NOTES
Daily Note     Today's date: 3/30/2021  Patient name: Andres Luevano  : 1975  MRN: [id number]  Referring provider: Sirena Camacho MD  Dx:   Encounter Diagnosis     ICD-10-CM    1  Dissection of carotid artery (HCC)  I77 71                   Subjective: Had blood levels checked again several times las tweek as pt had menstrual cycle and again was bleeding and felt weak  She had more iron infusions an dis to have one more  Scheduled for cauterization in 3 weeks  Would like to focus on getting improved endurance and strength before then  Objective: See treatment diary below    Assessment: Pt tolerated session well with improved ability to complete exercise although no tat th elevel she was prior to onset of uncontrolled bleeding  She will continue to benefit from progression of endurance as she wi able to tolerate  Plan: Continue per plan of care  Precautions: Carotid occlusion      Manuals 2/24 2/26  3/16  3/30                                                           Neuro Re-Ed             Side stepping  3 laps ht/hn  Foam beam  3 laps ht/hn foam         Step taps  Foam beams tandem walking   3 laps  Foam 3 laps tandem         Tandem ht/hn 15 ft x 8 Ht/hn 40'x 2           VOR x 1 45 sec x 2 ea            bakcwards  HT/HN  // bars  3 laps            SLS    30 sec x 3         Rocker board              Ht/hn hallway 40'x 2            bosu ball             VR             Tandem stance    30 sec x 3         Static balance    Foam - 30 sec EC x3         Ther Ex             STS             gastroc stretch             Mini squtas on faom             treadmill  5 min warm up   1  3mph    REP        1  7mph x 3 min     3 min recovery 1 4 mph     3 min 1  8mph     cooldown   2 min recovery 1 4 mph                5 min warm up   1  8mph    REP        2 4 mph x 3 min     3 min recovery 1 8 mph     3 min  2  4mph     cooldown   2 min recovery 1 8 mph                                                             Ther Activity                                       Gait Training                                       Modalities

## 2021-04-05 ENCOUNTER — OFFICE VISIT (OUTPATIENT)
Dept: NEUROSURGERY | Facility: CLINIC | Age: 46
End: 2021-04-05
Payer: COMMERCIAL

## 2021-04-05 VITALS
SYSTOLIC BLOOD PRESSURE: 110 MMHG | TEMPERATURE: 98.2 F | HEIGHT: 65 IN | BODY MASS INDEX: 27.66 KG/M2 | RESPIRATION RATE: 16 BRPM | WEIGHT: 166 LBS | DIASTOLIC BLOOD PRESSURE: 60 MMHG | HEART RATE: 79 BPM

## 2021-04-05 DIAGNOSIS — I77.71 INTERNAL CAROTID ARTERY DISSECTION (HCC): Primary | ICD-10-CM

## 2021-04-05 DIAGNOSIS — I63.9 ISCHEMIC STROKE (HCC): ICD-10-CM

## 2021-04-05 PROCEDURE — 99214 OFFICE O/P EST MOD 30 MIN: CPT | Performed by: NEUROLOGICAL SURGERY

## 2021-04-05 RX ORDER — MULTIVIT-MIN/IRON/FOLIC ACID/K 18-600-40
CAPSULE ORAL
COMMUNITY

## 2021-04-05 NOTE — PROGRESS NOTES
Patient Id: Crystal Morrow is a 41 y o  female        Handedness: Right     Assessment/Plan:    Diagnoses and all orders for this visit:    Internal carotid artery dissection ([address] )    Ischemic stroke ([address] )    Other orders      Discussion Summary:   1  Right carotid dissection, likely secondary to neck manipulation, with small punctate stroke  She is now approximately 3 months status post initial evaluation  She recently presented to the emergency room for uncontrolled menstrual bleeding while on anticoagulation  At this time CTA was repeated and demonstrated reconstitution and healing of her previously dissected right internal carotid artery  Anticoagulation was stopped at that time and she remains only on baby aspirin  Given her radiographic improvement and normal neurologic status I have recommended her continue baby aspirin for a total of 1 year and to avoid neck manipulation  I do not believe she requires further radiographic imaging of her cervical carotid at this time  2   Return to school   we talked about return to normal activities in school  I have no contraindication to her returning for schooling  I once again counseled her on the importance of avoiding neck manipulation  Chief Complaint: Follow-up        HPI:   This is a very pleasant 80-year-old female who presented to the hospital after neck pain and headaches that began immediately after neck manipulation and worsened over the course of several days  CTA was performed which demonstrated a right-sided carotid dissection and occlusion from proximal cervical segment to the skull base  Ultimately she was started on anticoagulation and aspirin due to a lack of neurologic findings  Unfortunately she was discharged the hospital and readmitted with worsening symptoms in vision changes  An MRI and MRA was done at that time which showed a right frontal small punctate emboli/DWI hit  Her symptoms have slowly improved  Neurologically the patient was doing very well  Unfortunately in March she was admitted to the hospital for uncontrolled vaginal bleeding  Repeat CTA was done at that time which showed healing of her dissection  Anticoagulation was stopped  She remains on baby aspirin  She has had no new neurologic deficits  She denies any new history of neck manipulation  She is scheduled for endometrial ablation in the near future  Her past medical history significant for hypertension  Past surgical history significant for D&C, tubal ligation, and finger surgery      She is not   She has 3 children  She denies any history of alcohol tobacco or illicit drug use  She is a       There is no significant family history of stroke or other carotid vascular diseases      Review of systems obtained by the MA reviewed and updated below  Review of Systems   Constitutional: Negative  HENT: Positive for tinnitus (occa in her ears)  Eyes: Negative  Respiratory: Negative  Cardiovascular: Negative  Gastrointestinal: Negative  Endocrine: Negative  Genitourinary: Negative  Musculoskeletal: Negative  Skin: Negative  Allergic/Immunologic: Negative  Neurological: Negative  Hematological: Negative  Psychiatric/Behavioral: Negative  Physical Exam  Vitals:    04/05/21 1314   BP: 110/60   Pulse: 79   Resp: 16   Temp: 98 2 °F (36 8 °C)   She is well appearing  Affect is appropriate  Body mass index is 28 05 kg/m²  Gerhardt Sep She is awake alert and oriented  Hearing and vision are grossly intact  Her pupils are equal round reactive to light  Her extraocular movements are intact  Her face is symmetric  Tongue is midline  Facial sensation is intact and symmetric throughout  Shoulder shrug is 5/5  There is no drift or dysmetria  She has full strength in her bilateral upper and lower extremities  She has normal muscle tone muscle bulk    Her biceps reflexes and patellar reflexes are 2+ and symmetric  Corby sign negative bilaterally  Sensation intact to light touch and pinprick throughout  Her gait is normal      Her heart rate is regular  normal respiratory effort    2+ radial pulses       The following portions of the patient's history were reviewed and updated as appropriate: allergies, current medications, past family history, past medical history, past social history, past surgical history and problem list     Active Ambulatory Problems     Diagnosis Date Noted    Hydronephrosis, right 12/20/2016    Internal carotid artery dissection ([facility] [address] ) 12/26/2020    Anemia 12/26/2020    Vision abnormalities 12/31/2020    Ischemic stroke ([facility] [address] ) 12/31/2020    Vaginal bleeding 03/01/2021     Resolved Ambulatory Problems     Diagnosis Date Noted    Acute nonintractable headache 12/26/2020     Past Medical History:   Diagnosis Date    Asthma     IBS (irritable bowel syndrome)        Past Surgical History:   Procedure Laterality Date    ABDOMINAL SURGERY      LAPROSCOPY FOR ENDOMETRIOSIS    CERVICAL BIOPSY  W/ LOOP ELECTRODE EXCISION      FINGER SURGERY      LEFT MIDDLE FINGER SURGERY 5 YRS AGO    TUBAL LIGATION           Current Outpatient Medications:     Ascorbic Acid (Vitamin C) 500 MG CAPS, Take by mouth Between 500-1000mg daily, Disp: , Rfl:     aspirin (ECOTRIN LOW STRENGTH) 81 mg EC tablet, Take 1 tablet (81 mg total) by mouth daily, Disp: 30 tablet, Rfl: 0    atorvastatin (LIPITOR) 40 mg tablet, Take 1 tablet (40 mg total) by mouth every evening, Disp: 30 tablet, Rfl: 0    b complex vitamins tablet, Take 1 tablet by mouth daily, Disp: , Rfl:     Magnesium 250 MG TABS, Take 2 capsules by mouth daily , Disp: , Rfl:     medroxyPROGESTERone (PROVERA) 5 mg tablet, Take 1 tablet (5 mg total) by mouth daily for 1 day (Patient taking differently: Take 10 mg by mouth daily ), Disp: 1 tablet, Rfl: 0    CHLOROPHYLL PO, Take 1 Dose by mouth daily, Disp: , Rfl:    cholecalciferol (VITAMIN D3) 1,000 units tablet, Take 1,000 Units by mouth daily, Disp: , Rfl:     Cholecalciferol 50 MCG (2000 UT) CAPS, Take 1 capsule by mouth daily, Disp: , Rfl:     gabapentin (NEURONTIN) 100 mg capsule, Take 1 capsule (100 mg total) by mouth 3 (three) times a day as needed (headache) (Patient not taking: Reported on 3/1/2021), Disp: 30 capsule, Rfl: 0    Lysine 500 MG CAPS, Take by mouth daily, Disp: , Rfl:     NON FORMULARY, Floradix Iron supplement, Disp: , Rfl:     Results/Data:   Imaging reviewed in detail as well as the report

## 2021-04-06 ENCOUNTER — OFFICE VISIT (OUTPATIENT)
Dept: PHYSICAL THERAPY | Facility: CLINIC | Age: 46
End: 2021-04-06
Payer: COMMERCIAL

## 2021-04-06 ENCOUNTER — OFFICE VISIT (OUTPATIENT)
Dept: OCCUPATIONAL THERAPY | Facility: CLINIC | Age: 46
End: 2021-04-06
Payer: COMMERCIAL

## 2021-04-06 DIAGNOSIS — I77.71 DISSECTION OF CAROTID ARTERY (HCC): Primary | ICD-10-CM

## 2021-04-06 PROCEDURE — 97110 THERAPEUTIC EXERCISES: CPT | Performed by: PHYSICAL THERAPIST

## 2021-04-06 PROCEDURE — 97530 THERAPEUTIC ACTIVITIES: CPT

## 2021-04-06 PROCEDURE — 97150 GROUP THERAPEUTIC PROCEDURES: CPT

## 2021-04-06 PROCEDURE — 97112 NEUROMUSCULAR REEDUCATION: CPT | Performed by: PHYSICAL THERAPIST

## 2021-04-06 PROCEDURE — 97150 GROUP THERAPEUTIC PROCEDURES: CPT | Performed by: PHYSICAL THERAPIST

## 2021-04-06 NOTE — PROGRESS NOTES
Daily Note     Today's date: 2021  Patient name: Jeni Johansen  : 1975  MRN: [id number]  Referring provider: Nat Berg MD  Dx:   Encounter Diagnosis     ICD-10-CM    1  Dissection of carotid artery (HCC)  I77 71        Start Time: 1000  Stop Time: 1100  Total time in clinic (min): 60 minutes    Subjective:  Has been feeling well and given clearance by neuro surgeon for more activity  Objective: See treatment diary below    Assessment: Pt able to do more functional exercise today walking up and down hills  Patient fatigued by end of session but able to continue to exercise through the hour  Plan: Continue per plan of care  Precautions: Carotid occlusion      Manuals 2/24 2/26  3/16  3/30  3/31  4/6                                                       Neuro Re-Ed             Side stepping  3 laps ht/hn  Foam beam  3 laps ht/hn foam    OTB 3 laps //bars with knee flex     Step taps  Foam beams tandem walking   3 laps  Foam 3 laps tandem    Step ups 15x ea fwd/lat 8"  Step ups with hold 15x ea 8" step   Tandem ht/hn 15 ft x 8 Ht/hn 40'x 2        Foam 4 laps    Static 30 sec ht/hn 30 sec x 3   VOR x 1 45 sec x 2 ea            bakcwards  HT/HN  // bars  3 laps            SLS    30 sec x 3         Rocker board              Ht/hn hallway 40'x 2            bosu ball        10x mini squat     VR             Tandem stance    30 sec x 3         Static balance    Foam - 30 sec EC x3         Ther Ex             STS             gastroc stretch             Mini squtas on faom             treadmill  5 min warm up   1  3mph    REP        1  7mph x 3 min     3 min recovery 1 4 mph     3 min 1  8mph     cooldown   2 min recovery 1 4 mph                5 min warm up   1  8mph    REP        2 4 mph x 3 min     3 min recovery 1 8 mph     3 min  2  4mph     cooldown   2 min recovery 1 8 mph    5 min warm up   1  8mph    REP        2 4 mph x 3 min     3 min recovery 1 8 mph     3 min  2  4mph     cooldown   2 min recovery 1 8 mph  3 min warm up 2 4 mph to 3 0 mph in 3 min intervals to 15 min                                                       Ther Activity                                       Gait Training             Grass and sidewalks an dhills          10 min outside                Modalities

## 2021-04-06 NOTE — PROGRESS NOTES
Daily Note     Today's date: 2021  Patient name: Koko Lima  : 1975  MRN: [id number]  Referring provider: Gilda Carter MD  Dx:   Encounter Diagnosis   Name Primary?  Dissection of carotid artery (HCC) Yes                  11:00-11:25= 1/  11:25-11:50= Unsupervised  11:50-12:00= Group    Subjective: I tried to use the alphabetizing to remember the words  Objective: See treatment diary below    Pt participated in skilled OT focusing on susatined visual and cognitive attention, VM/ skills, mental manipulation, verbal direction following, and memory strategies, for increased engagement in ADL/IADLs  Pt engaged in arrow/number grid transfer with copies placed on slant board in front of pt in addition to written sheets placed on tabletop, encouraging /VM skills and sustained/divided attention  Pt alternated attention between letter/number grid manipulation with 2 minute timer  Pt taking increased time with mod difficulty at first  Pt completed with increased time and no A to get back on track after getting visually lost in grid  Pt completed with overall 80% accuracy and increased time  Therapist did not have to repeat directions more than x 1, with pt stating verbal understanding  Pt engaged in cross check of letters with number identity sheet placed on slant board in front of pt with written sheet on tabletop, encouraging /VM skills, near/far gaze, place keeping, stm/recall, and sustained attention  Pt then had 5 minutes to study list x 20 words  Pt chose to use alphabetizing strategy to try and memorize words  Pt also wrote words in order  Post study time, pt recalled 14/20 words correctly  Therapist providing min verbal cues to aid pt to recall x 4 words on word list       Assessment: Tolerated treatment well   Patient exhibited good technique with therapeutic exercises and would benefit from continued OT      Plan: Continued skilled OT per POC        INTERVENTION COMMENTS:  Diagnosis: Dissection of carotid artery ([facility] [address] ) [I77 71]  Precautions: NO OVERHEAD LIFTING, no lifting >10 lbs  FOTO: n/a  Insurance: Payor: Maryana Certain / Plan: Maryana Certain / Product Type: Medicaid HMO Leena Scales  97 HW 60 KPAXXH, PN due 3/27

## 2021-04-09 ENCOUNTER — OFFICE VISIT (OUTPATIENT)
Dept: OCCUPATIONAL THERAPY | Facility: CLINIC | Age: 46
End: 2021-04-09
Payer: COMMERCIAL

## 2021-04-09 ENCOUNTER — OFFICE VISIT (OUTPATIENT)
Dept: PHYSICAL THERAPY | Facility: CLINIC | Age: 46
End: 2021-04-09
Payer: COMMERCIAL

## 2021-04-09 DIAGNOSIS — I77.71 DISSECTION OF CAROTID ARTERY (HCC): Primary | ICD-10-CM

## 2021-04-09 PROCEDURE — 97112 NEUROMUSCULAR REEDUCATION: CPT | Performed by: PHYSICAL THERAPIST

## 2021-04-09 PROCEDURE — 97110 THERAPEUTIC EXERCISES: CPT

## 2021-04-09 PROCEDURE — 97530 THERAPEUTIC ACTIVITIES: CPT

## 2021-04-09 PROCEDURE — 97112 NEUROMUSCULAR REEDUCATION: CPT

## 2021-04-09 PROCEDURE — 97150 GROUP THERAPEUTIC PROCEDURES: CPT | Performed by: PHYSICAL THERAPIST

## 2021-04-09 NOTE — PROGRESS NOTES
Occupational Therapy Daily Note:    Today's date: 2021  Patient name: Remigio Paulson  : 1975  MRN: [id number]  Referring provider: Rangel Hou MD  Dx:   Encounter Diagnosis   Name Primary?  Dissection of carotid artery (HCC) Yes            Pt was treated by EVELIA Fermin and treatment was supervised and documentation was reviewed by Ritesh ALTAMIRANO/L       Subjective: "The arm bike made my arm very tired" "My eyes feel like they've gotten a lot better"    Objective: Pt seen for OT treatment session focusing on /VM skills, alternating attention, and UE strength and endurance to inc engagement in ADL/IADLs  Pt tolerated 5 min x 2 (prograde/retrograde motions at L1 resist) seated on UBE with focus on increasing proximal UE strength, endurance, act tolerance and ROM to inc indep and safety with ADL/IADL fxn and fxnl reaching tasks  Pt stated multimodal environment was overwhelming and tx moved to private room  Pt engaged in Picoto Chart activity taped monocularly to address eye strengthening  Activity upgraded to include cog load and pt instructed to read every other letter and alternate identifying foods and names to address alternating attention  Activity upgraded to Picoto Chart procedure 2 and pt taped peripherally to address sustained convergence w/cog load of naming places and animals  Pt demo loss of place 2x and required inc time for cog load  Pt completed w/min c/o fatigue  Assessment: Tolerated treatment well  Pt demo and stated difficulty focusing in multimodal environment  Pt demo inc visual endurance  Pt stated no neck strain activity for 6-12 months per doctors orders  Patient would benefit from continued skilled OT  Plan: Continued skilled OT per POC      INTERVENTION COMMENTS:  Diagnosis: Dissection of carotid artery ([facility] [address] ) [I77 71]  Precautions: NO OVERHEAD LIFTING, no lifting >10 lbs  FOTO: n/a  Insurance: Payor: Maurine Shone / Plan: Adria Whiteside JOI / Product Type: Medicaid [facility] Zander Doty  60 IU 13 ERIN, PN due 3/27

## 2021-04-09 NOTE — PROGRESS NOTES
Daily Note     Today's date: 2021  Patient name: Marita Razo  : 1975  MRN: [id number]  Referring provider: Maile Canavan, MD  Dx:   Encounter Diagnosis     ICD-10-CM    1  Dissection of carotid artery (HCC)  I77 71                   Subjective:  Has been feeling very good     Objective: See treatment diary below    Assessment: Pt did well with session with minimal activities  Discussed with patient in one week trasition to HEP with one check in  Plan: Continue per plan of care  Precautions: Carotid occlusion      Manuals 4/9       3/31  4/6                                                       Neuro Re-Ed             Side stepping        OTB 3 laps //bars with knee flex     Step taps Step ups with hold 15x ea 12" step       Step ups 15x ea fwd/lat 8"  Step ups with hold 15x ea 8" step   Tandem ht/hn 5 laps          Foam 4 laps    Static 30 sec ht/hn 30 sec x 3   VOR x 1             bakcwards             SLS             Rocker board                          bosu ball 30x       10x mini squat     VR             Tandem stance             Marches supine TRA [id number]    Hip abd/add  [id number] ea iso            Ther Ex             STS 30x            gastroc stretch             Mini squtas on faom             treadmill        5 min warm up   1  8mph    REP        2 4 mph x 3 min     3 min recovery 1 8 mph     3 min  2  4mph     cooldown   2 min recovery 1 8 mph  3 min warm up 2 4 mph to 3 0 mph in 3 min intervals to 15 min                                                       Ther Activity                                       Gait Training             Grass and sidewalks an dhills          10 min outside                Modalities

## 2021-04-12 ENCOUNTER — APPOINTMENT (OUTPATIENT)
Dept: PHYSICAL THERAPY | Facility: CLINIC | Age: 46
End: 2021-04-12
Payer: COMMERCIAL

## 2021-04-12 ENCOUNTER — APPOINTMENT (OUTPATIENT)
Dept: OCCUPATIONAL THERAPY | Facility: CLINIC | Age: 46
End: 2021-04-12
Payer: COMMERCIAL

## 2021-04-14 ENCOUNTER — APPOINTMENT (OUTPATIENT)
Dept: PHYSICAL THERAPY | Facility: CLINIC | Age: 46
End: 2021-04-14
Payer: COMMERCIAL

## 2021-04-14 ENCOUNTER — APPOINTMENT (OUTPATIENT)
Dept: OCCUPATIONAL THERAPY | Facility: CLINIC | Age: 46
End: 2021-04-14
Payer: COMMERCIAL

## 2021-04-16 ENCOUNTER — OFFICE VISIT (OUTPATIENT)
Dept: PHYSICAL THERAPY | Facility: CLINIC | Age: 46
End: 2021-04-16
Payer: COMMERCIAL

## 2021-04-16 ENCOUNTER — OFFICE VISIT (OUTPATIENT)
Dept: OCCUPATIONAL THERAPY | Facility: CLINIC | Age: 46
End: 2021-04-16
Payer: COMMERCIAL

## 2021-04-16 DIAGNOSIS — I77.71 DISSECTION OF CAROTID ARTERY (HCC): Primary | ICD-10-CM

## 2021-04-16 PROCEDURE — 97110 THERAPEUTIC EXERCISES: CPT

## 2021-04-16 PROCEDURE — 97112 NEUROMUSCULAR REEDUCATION: CPT

## 2021-04-16 PROCEDURE — 97530 THERAPEUTIC ACTIVITIES: CPT

## 2021-04-16 PROCEDURE — 97112 NEUROMUSCULAR REEDUCATION: CPT | Performed by: PHYSICAL THERAPIST

## 2021-04-16 NOTE — PROGRESS NOTES
Daily Note     Today's date: 2021  Patient name: Tracy Orantes  : 1975  MRN: [id number]  Referring provider: Taan Gastelum MD  Dx:   Encounter Diagnosis     ICD-10-CM    1  Dissection of carotid artery (HCC)  I77 71                   Subjective:  Has been feeling very good     Objective: See treatment diary below    Assessment: Pt did well with session with minimal activities  Discussed with patient in one week trasition to HEP with one check in  Plan: Continue per plan of care  Precautions: Carotid occlusion      Manuals 4/9       3/31  4/6                                                       Neuro Re-Ed             Side stepping        OTB 3 laps //bars with knee flex     Step taps Step ups with hold 15x ea 12" step       Step ups 15x ea fwd/lat 8"  Step ups with hold 15x ea 8" step   Tandem ht/hn 5 laps          Foam 4 laps    Static 30 sec ht/hn 30 sec x 3   VOR x 1             bakcwards             SLS             Rocker board                          bosu ball 30x       10x mini squat     VR             Tandem stance             Marches supine TRA [id number]    Hip abd/add  [id number] ea iso            Ther Ex             STS 30x            gastroc stretch             Mini squtas on faom             treadmill        5 min warm up   1  8mph    REP        2 4 mph x 3 min     3 min recovery 1 8 mph     3 min  2  4mph     cooldown   2 min recovery 1 8 mph  3 min warm up 2 4 mph to 3 0 mph in 3 min intervals to 15 min                                                       Ther Activity                                       Gait Training             Grass and sidewalks an dhills          10 min outside                Modalities

## 2021-04-16 NOTE — PROGRESS NOTES
Occupational Therapy Daily Note:    Today's date: 2021  Patient name: Stefan Keita  : 1975  MRN: [id number]  Referring provider: Frantz Diaz MD  Dx:   Encounter Diagnosis   Name Primary?  Dissection of carotid artery (HCC) Yes            Pt was treated by EVELIA Marroquin and treatment was supervised and documentation was reviewed by Howie ALTAMIRANO/SALEEM       Subjective: "My eyes feel fine but my arm is really tired"    Objective: Pt seen for OT treatment session focusing on oculomotor re-ed, UE endurance and GMS to inc engagement and ind in ADL/IADL and school tasks  Pt tolerated 5 min x 2 (prograde/retrograde motions at L1 resist) seated on UEB with focus on increasing proximal UE strength, endurance, act tolerance and ROM to inc indep and safety with ADL/IADL and fxnl reaching tasks  Pt tolerated 10 min monocular taping supine to engage in near point sustained convergence task of "One Tray Schwab" worksheet to address visual tolerance, endurance, and accuracy with multidirectional saccades  Pt instructed to color sheet taped to underside of table about 12 inches away to incorporate UE endurance  Pt c/o UE pain and required frequent rest breaks  Pt tolerated 10 min b/l peripheral field taping to focus on convergence while engaging in line tangles worksheets  Pt requested seated position due to fatigue in arm  Pt engaged in golf ball and cone pass to focus on visual pursuits  Pt completed 1 ball pass independently  Activity upgraded to pass 4 at a time and pt demo 1 drop throughout w/o c/o HA or visual fatigue  Assessment: Tolerated treatment well  Pt demo G visual tolerance/endurance and difficulty w/UE endurance  Patient would benefit from continued skilled OT  Plan: Continued skilled OT per POC      INTERVENTION COMMENTS:  Diagnosis: Dissection of carotid artery ([address] ) [I77 71]  Precautions: NO OVERHEAD LIFTING, no lifting >10 lbs  FOTO: n/a  Insurance: Payor: Swathi Notice / Plan: Swathi Notice / Product Type: Medicaid [facility] Tabatha Schmid  50 DT 95 NGSPNR, PN due 3/27

## 2021-04-28 ENCOUNTER — TRANSCRIBE ORDERS (OUTPATIENT)
Dept: OCCUPATIONAL THERAPY | Facility: CLINIC | Age: 46
End: 2021-04-28

## 2021-04-28 ENCOUNTER — EVALUATION (OUTPATIENT)
Dept: OCCUPATIONAL THERAPY | Facility: CLINIC | Age: 46
End: 2021-04-28
Payer: COMMERCIAL

## 2021-04-28 DIAGNOSIS — I77.71 DISSECTION OF CAROTID ARTERY (HCC): Primary | ICD-10-CM

## 2021-04-28 PROCEDURE — 97530 THERAPEUTIC ACTIVITIES: CPT

## 2021-04-28 NOTE — LETTER
April 28, 2021    Pennelope Kayser, MD  [facility]    Patient: Jackson Branham   YOB: 1975   Date of Visit: 4/28/2021     Encounter Diagnosis     ICD-10-CM    1  Dissection of carotid artery [facility])  I77 71        Dear Dr Annamaria Henning:    Thank you for your recent referral of Jackson Branham  Please review the attached evaluation summary from Edna's recent visit  Please verify that you agree with the plan of care by signing the attached order  If you have any questions or concerns, please do not hesitate to call  I sincerely appreciate the opportunity to share in the care of one of your patients and hope to have another opportunity to work with you in the near future  Sincerely,    Mary Aguilar, OT      Referring Provider:     I certify that I have read the below Plan of Care and certify the need for these services furnished under this plan of treatment while under my care  Pennelope Kayser, MD  [facility] [address]  [address]  Via Fax: [phone]        OCCUPATIONAL THERAPY PROGRESS UPDATE:    4/28/2021  Jackson Branham  1975  [id number]  Margueritte Osler, MD  1  Dissection of carotid artery (HCC)          Subjective    "I feel much better after my surgery"    PATIENT GOAL: "I feel like I can continue with everything at home"      Assessment/Plan    Skilled Analysis:  Pt is a 41 y o  female referred to Occupational Therapy s/p Dissection of carotid artery (HCC) [I77 71] s/p carotid artery dissection  Pt has not been to OP therapies due to recent sx to control pt's bleeding  Pt reports since the procedure she has felt "much better" and has been progressing physically, cognitively and visually  Pt states she is cleared to return to exercise as tolerated with lifting restrictions only   Pt states that Pt reports that since participation in OP OT services, she has noticed an improvement in her visual tolerance, UB endurance/strength and dec dizziness, eye strain  Pt is continuing to participate in school tasks 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  Following testing, pt has demonstrated excellent improvements overall visually and cognitively as well as UB strength and endurance  Pt at this time does not demo the need for continued OP OT services with recommendations to continue HEP at home environment  Pt in agreement   D/C OT     Goals:    Short Term Goals (4-6wks)    VISION      · Pt will tolerate oculomotor skills for improved saccades, pursuits, con/divergent tasks for improved reading tolerance  and computer/screen tasks  with minimal increase in symptoms:  MET      · Pt will increase oculomotor control and tolerance for improved dynamic activities with head turns, board/screen to table tasks for 30 min with minimal increase in symptoms for improved school, life and work roles  : MET    · Pt will demo ability to tolerate x 30minutes of screen time with minimal increase in symptoms to inc engagement and tolerance for school and work roles and salient tasks :  MET    · Pt will tolerate multi-modal envt x 30 min with cog load and min increase of symptoms  (2 levels or less) in HA/dizziness/nausea :MET    · Pt will demo good carryover of self calming strategies for hypersensitivities for HA management and improved tolerance of cog load tasks :MET          MOTOR    · Pt will increase L UE strength to 4+/5 and  strength R=L, through the use of strengthening exercises and home program for eventual return to school, life and work roles and salient tasks  MET    · Pt will demo with G carryover of Home Exercise Program to improve functional progression towards goals in Plan of care and for improved functional use of UE  MET       *goal added today 2/26/21  Short Term Goals: (4 weeks-6 weeks)    COGNITION      - Attention    o Pt will demo ability to participate in dual tasking/divided attention task with % accuracy in multimodal environment to simulate return to school and life roles: MET    o Pt will demo ability to alternate attention between 2 tasks with cog loading and % accuracy with G retention of task directions in multimodal environment to simulate return to school and life  roles : MET            Treatment Interventions  N/a d/c        Pain Levels:     Restin    With Activity:  2 (headaches)    Objective    Impairment Observations:      Upper Extremity       LUE RUE Comments                 UPPER EXTREMITY FXN Impaired Intact Dominant Hand: R                         /Pinch Strength         Dynamometer      - Gross Grasp 55 lbs 55 lbs improved   Pinch Meter       - PINCER 11 lbs 15 lbs     - TRIPOD 14 lbs 15 lbs     - LATERAL 18 lbs  19 5  lbs              SENSATION      Myofilaments (3 61 Wnl) 3 61 3 61    Sharp Dull  Intact Intact    Proprioception Intact Intact    Hot/Cold Temp Intact Intact          COORDINATION      9 Hole Peg Test 17 seconds 15 seconds improved   Fxnl Dexterity Test 16 10 seconds 14 seconds improved       Vision      R EYE     L EYE Comments                                        VISION SCREEN          NONE          Visual Acuity (near) 20/25 20/20 improved   Binocularity (near) orthotropia and orthophoria orthotropia and orthophoria    Binocularity (far) orthotropia and orthophoria orthotropia and orthophoria    Convergence  Diplopia seen at 2 inches      [employer]  X   X and alignment            Accommodation Blurriness reported at 3 inches   imporved   Pursuits smooth in all planes smooth in all planes No s/s   Saccades accurate in all planes accurate in all planes    [employer] [facility]    Visual perceptual midline              Midline at at            [place]  at at        INTERVENTION COMMENTS:  Diagnosis: Dissection of carotid artery (HCC) [I77 71]  Precautions: NO OVERHEAD LIFTING, no lifting >10 lbs  FOTO: n/a  Insurance: Payor: Adam Height / Plan: Adam Height / Product Type: Medicaid O Lisa Alford  38 DA 12 XXFUOB, PN n/a

## 2021-04-28 NOTE — PROGRESS NOTES
OCCUPATIONAL THERAPY PROGRESS UPDATE:    4/28/2021  Theresa Landau  1975  [id number]  Getachew Putnam MD  1  Dissection of carotid artery (HCC)          Subjective    "I feel much better after my surgery"    PATIENT GOAL: "I feel like I can continue with everything at home"      Assessment/Plan    Skilled Analysis:  Pt is a 41 y o  female referred to Occupational Therapy s/p Dissection of carotid artery (HCC) [I77 71] s/p carotid artery dissection  Pt has not been to OP therapies due to recent sx to control pt's bleeding  Pt reports since the procedure she has felt "much better" and has been progressing physically, cognitively and visually  Pt states she is cleared to return to exercise as tolerated with lifting restrictions only  Pt states that Pt reports that since participation in OP OT services, she has noticed an improvement in her visual tolerance, UB endurance/strength and dec dizziness, eye strain  Pt is continuing to participate in school tasks 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  Following testing, pt has demonstrated excellent improvements overall visually and cognitively as well as UB strength and endurance  Pt at this time does not demo the need for continued OP OT services with recommendations to continue HEP at home environment  Pt in agreement   D/C OT     Goals:    Short Term Goals (4-6wks)    VISION      · Pt will tolerate oculomotor skills for improved saccades, pursuits, con/divergent tasks for improved reading tolerance  and computer/screen tasks  with minimal increase in symptoms:  MET      · Pt will increase oculomotor control and tolerance for improved dynamic activities with head turns, board/screen to table tasks for 30 min with minimal increase in symptoms for improved school, life and work roles  : MET    · Pt will demo ability to tolerate x 30minutes of screen time with minimal increase in symptoms to inc engagement and tolerance for school and work roles and salient tasks :  MET    · Pt will tolerate multi-modal envt x 30 min with cog load and min increase of symptoms  (2 levels or less) in HA/dizziness/nausea :MET    · Pt will demo good carryover of self calming strategies for hypersensitivities for HA management and improved tolerance of cog load tasks :MET          MOTOR    · Pt will increase L UE strength to 4+/5 and  strength R=L, through the use of strengthening exercises and home program for eventual return to school, life and work roles and salient tasks  MET    · Pt will demo with G carryover of Home Exercise Program to improve functional progression towards goals in Plan of care and for improved functional use of UE  MET       *goal added today 21  Short Term Goals: (4 weeks-6 weeks)    COGNITION      - Attention    o Pt will demo ability to participate in dual tasking/divided attention task with % accuracy in multimodal environment to simulate return to school and life roles: MET    o Pt will demo ability to alternate attention between 2 tasks with cog loading and % accuracy with G retention of task directions in multimodal environment to simulate return to school and life  roles : MET            Treatment Interventions  N/a d/c        Pain Levels:     Restin    With Activity:  2 (headaches)    Objective    Impairment Observations:      Upper Extremity       LUE RUE Comments                 UPPER EXTREMITY FXN Impaired Intact Dominant Hand: R                         /Pinch Strength         Dynamometer      - Gross Grasp 55 lbs 55 lbs improved   Pinch Meter       - PINCER 11 lbs 15 lbs     - TRIPOD 14 lbs 15 lbs     - LATERAL 18 lbs  19 5  lbs              SENSATION      Myofilaments (3 61 Wnl) 3 61 3 61    Sharp Dull  Intact Intact    Proprioception Intact Intact    Hot/Cold Temp Intact Intact          COORDINATION      9 Hole Peg Test 17 seconds 15 seconds improved   Fxnl Dexterity Test 16 10 seconds 14 seconds improved Vision      R EYE     L EYE Comments                                        VISION SCREEN          NONE          Visual Acuity (near) 20/25 20/20 improved   Binocularity (near) orthotropia and orthophoria orthotropia and orthophoria    Binocularity (far) orthotropia and orthophoria orthotropia and orthophoria    Convergence  Diplopia seen at 2 inches      [employer]  X   X and alignment            Accommodation Blurriness reported at 3 inches   imporved   Pursuits smooth in all planes smooth in all planes No s/s   Saccades accurate in all planes accurate in all planes    [facility]    Visual perceptual midline              Midline at at            [place]  at at        INTERVENTION COMMENTS:  Diagnosis: Dissection of carotid artery ([facility] [address] ) [I77 71]  Precautions: NO OVERHEAD LIFTING, no lifting >10 lbs  FOTO: n/a  Insurance: Payor: [employer] / Plan: Ritu Mejia / Product Type: Medicaid O Michaela Bailey  78 FN 56 JVERN, PN n/a

## 2024-02-01 DIAGNOSIS — Z00.6 ENCOUNTER FOR EXAMINATION FOR NORMAL COMPARISON OR CONTROL IN CLINICAL RESEARCH PROGRAM: ICD-10-CM

## 2024-03-07 ENCOUNTER — APPOINTMENT (OUTPATIENT)
Dept: LAB | Facility: AMBULARY SURGERY CENTER | Age: 49
End: 2024-03-07
Payer: COMMERCIAL

## 2024-03-07 DIAGNOSIS — R53.83 TIREDNESS: ICD-10-CM

## 2024-03-07 DIAGNOSIS — Z00.6 ENCOUNTER FOR EXAMINATION FOR NORMAL COMPARISON OR CONTROL IN CLINICAL RESEARCH PROGRAM: ICD-10-CM

## 2024-03-07 LAB
ALBUMIN SERPL BCP-MCNC: 3.7 G/DL (ref 3.5–5)
ALP SERPL-CCNC: 30 U/L (ref 34–104)
ALT SERPL W P-5'-P-CCNC: 12 U/L (ref 7–52)
ANA SER QL IA: NEGATIVE
ANION GAP SERPL CALCULATED.3IONS-SCNC: 6 MMOL/L
AST SERPL W P-5'-P-CCNC: 17 U/L (ref 13–39)
BASOPHILS # BLD AUTO: 0.05 THOUSANDS/ÂΜL (ref 0–0.1)
BASOPHILS NFR BLD AUTO: 1 % (ref 0–1)
BILIRUB SERPL-MCNC: 0.42 MG/DL (ref 0.2–1)
BUN SERPL-MCNC: 11 MG/DL (ref 5–25)
CALCIUM SERPL-MCNC: 9.3 MG/DL (ref 8.4–10.2)
CHLORIDE SERPL-SCNC: 102 MMOL/L (ref 96–108)
CO2 SERPL-SCNC: 29 MMOL/L (ref 21–32)
CREAT SERPL-MCNC: 0.92 MG/DL (ref 0.6–1.3)
EOSINOPHIL # BLD AUTO: 0.2 THOUSAND/ÂΜL (ref 0–0.61)
EOSINOPHIL NFR BLD AUTO: 3 % (ref 0–6)
ERYTHROCYTE [DISTWIDTH] IN BLOOD BY AUTOMATED COUNT: 14.5 % (ref 11.6–15.1)
GFR SERPL CREATININE-BSD FRML MDRD: 74 ML/MIN/1.73SQ M
GLUCOSE P FAST SERPL-MCNC: 80 MG/DL (ref 65–99)
HCT VFR BLD AUTO: 39.8 % (ref 34.8–46.1)
HGB BLD-MCNC: 12.7 G/DL (ref 11.5–15.4)
IMM GRANULOCYTES # BLD AUTO: 0.03 THOUSAND/UL (ref 0–0.2)
IMM GRANULOCYTES NFR BLD AUTO: 1 % (ref 0–2)
LYMPHOCYTES # BLD AUTO: 1.91 THOUSANDS/ÂΜL (ref 0.6–4.47)
LYMPHOCYTES NFR BLD AUTO: 31 % (ref 14–44)
MCH RBC QN AUTO: 29.6 PG (ref 26.8–34.3)
MCHC RBC AUTO-ENTMCNC: 31.9 G/DL (ref 31.4–37.4)
MCV RBC AUTO: 93 FL (ref 82–98)
MONOCYTES # BLD AUTO: 0.74 THOUSAND/ÂΜL (ref 0.17–1.22)
MONOCYTES NFR BLD AUTO: 12 % (ref 4–12)
NEUTROPHILS # BLD AUTO: 3.27 THOUSANDS/ÂΜL (ref 1.85–7.62)
NEUTS SEG NFR BLD AUTO: 52 % (ref 43–75)
NRBC BLD AUTO-RTO: 0 /100 WBCS
PLATELET # BLD AUTO: 235 THOUSANDS/UL (ref 149–390)
PMV BLD AUTO: 11.9 FL (ref 8.9–12.7)
POTASSIUM SERPL-SCNC: 4.3 MMOL/L (ref 3.5–5.3)
PROT SERPL-MCNC: 6.4 G/DL (ref 6.4–8.4)
RBC # BLD AUTO: 4.29 MILLION/UL (ref 3.81–5.12)
SODIUM SERPL-SCNC: 137 MMOL/L (ref 135–147)
T3 SERPL-MCNC: 0.8 NG/ML
T4 FREE SERPL-MCNC: 0.72 NG/DL (ref 0.61–1.12)
TSH SERPL DL<=0.05 MIU/L-ACNC: 3.4 UIU/ML (ref 0.45–4.5)
WBC # BLD AUTO: 6.2 THOUSAND/UL (ref 4.31–10.16)

## 2024-03-07 PROCEDURE — 36415 COLL VENOUS BLD VENIPUNCTURE: CPT

## 2024-03-07 PROCEDURE — 86038 ANTINUCLEAR ANTIBODIES: CPT

## 2024-03-07 PROCEDURE — 86003 ALLG SPEC IGE CRUDE XTRC EA: CPT

## 2024-03-07 PROCEDURE — 82785 ASSAY OF IGE: CPT

## 2024-03-07 PROCEDURE — 84439 ASSAY OF FREE THYROXINE: CPT

## 2024-03-07 PROCEDURE — 84480 ASSAY TRIIODOTHYRONINE (T3): CPT

## 2024-03-07 PROCEDURE — 80053 COMPREHEN METABOLIC PANEL: CPT

## 2024-03-07 PROCEDURE — 85025 COMPLETE CBC W/AUTO DIFF WBC: CPT

## 2024-03-07 PROCEDURE — 84443 ASSAY THYROID STIM HORMONE: CPT

## 2024-03-11 LAB
A ALTERNATA IGE QN: <0.1 KUA/I
A FUMIGATUS IGE QN: <0.1 KUA/I
ALMOND IGE QN: <0.1 KUA/I
BERMUDA GRASS IGE QN: <0.1 KUA/I
BOXELDER IGE QN: <0.1 KUA/I
C HERBARUM IGE QN: <0.1 KUA/I
CASHEW NUT IGE QN: <0.1 KUA/I
CAT DANDER IGE QN: <0.1 KUA/I
CMN PIGWEED IGE QN: <0.1 KUA/I
CODFISH IGE QN: <0.1 KUA/I
COMMON RAGWEED IGE QN: <0.1 KUA/I
COTTONWOOD IGE QN: <0.1 KUA/I
D FARINAE IGE QN: <0.1 KUA/I
D PTERONYSS IGE QN: <0.1 KUA/I
DOG DANDER IGE QN: <0.1 KUA/I
EGG WHITE IGE QN: <0.1 KUA/I
GLUTEN IGE QN: <0.1 KUA/I
HAZELNUT IGE QN: <0.1 KUA/L
LONDON PLANE IGE QN: <0.1 KUA/I
MILK IGE QN: <0.1 KUA/I
MOUSE URINE PROT IGE QN: <0.1 KUA/I
MT JUNIPER IGE QN: <0.1 KUA/I
MUGWORT IGE QN: <0.1 KUA/I
P NOTATUM IGE QN: <0.1 KUA/I
PEANUT IGE QN: <0.1 KUA/I
ROACH IGE QN: <0.1 KUA/I
SALMON IGE QN: <0.1 KUA/I
SCALLOP IGE QN: <0.1 KUA/L
SESAME SEED IGE QN: <0.1 KUA/I
SHEEP SORREL IGE QN: <0.1 KUA/I
SHRIMP IGE QN: <0.1 KUA/L
SILVER BIRCH IGE QN: <0.1 KUA/I
SOYBEAN IGE QN: <0.1 KUA/I
TIMOTHY IGE QN: <0.1 KUA/I
TOTAL IGE SMQN RAST: 187 KU/L (ref 0–113)
TOTAL IGE SMQN RAST: 187 KU/L (ref 0–113)
TUNA IGE QN: <0.1 KUA/I
WALNUT IGE QN: <0.1 KUA/I
WALNUT IGE QN: <0.1 KUA/I
WHEAT IGE QN: <0.1 KUA/I
WHITE ASH IGE QN: <0.1 KUA/I
WHITE ELM IGE QN: <0.1 KUA/I
WHITE MULBERRY IGE QN: <0.1 KUA/I
WHITE OAK IGE QN: <0.1 KUA/I

## 2024-04-30 LAB
APOB+LDLR+PCSK9 GENE MUT ANL BLD/T: NOT DETECTED
BRCA1+BRCA2 DEL+DUP + FULL MUT ANL BLD/T: NOT DETECTED
MLH1+MSH2+MSH6+PMS2 GN DEL+DUP+FUL M: NOT DETECTED

## 2025-07-30 ENCOUNTER — OFFICE VISIT (OUTPATIENT)
Age: 50
End: 2025-07-30
Payer: COMMERCIAL

## 2025-07-30 DIAGNOSIS — R63.5 WEIGHT GAIN: Primary | ICD-10-CM

## 2025-07-30 PROBLEM — Z85.819 HISTORY OF MALIGNANT NEOPLASM OF NASOPHARYNX: Status: ACTIVE | Noted: 2024-05-02

## 2025-07-30 PROBLEM — D64.9 ANEMIA: Status: RESOLVED | Noted: 2020-12-26 | Resolved: 2025-07-30

## 2025-07-30 PROBLEM — N93.9 VAGINAL BLEEDING: Status: RESOLVED | Noted: 2021-03-01 | Resolved: 2025-07-30

## 2025-07-30 PROBLEM — R87.810 CERVICAL HIGH RISK HPV (HUMAN PAPILLOMAVIRUS) TEST POSITIVE: Status: ACTIVE | Noted: 2022-05-06

## 2025-07-30 PROCEDURE — 99205 OFFICE O/P NEW HI 60 MIN: CPT | Performed by: OBSTETRICS & GYNECOLOGY

## 2025-07-30 RX ORDER — LEVALBUTEROL TARTRATE 45 UG/1
2 AEROSOL, METERED ORAL DAILY PRN
COMMUNITY

## 2025-07-31 ENCOUNTER — APPOINTMENT (OUTPATIENT)
Dept: LAB | Facility: CLINIC | Age: 50
End: 2025-07-31
Payer: COMMERCIAL

## 2025-07-31 DIAGNOSIS — R63.5 WEIGHT GAIN: ICD-10-CM

## 2025-07-31 LAB
CORTIS AM PEAK SERPL-MCNC: 10.5 UG/DL (ref 6.7–22.6)
TESTOST SERPL-MSCNC: 35 NG/DL (ref 0–75)

## 2025-07-31 PROCEDURE — 36415 COLL VENOUS BLD VENIPUNCTURE: CPT

## 2025-07-31 PROCEDURE — 82627 DEHYDROEPIANDROSTERONE: CPT

## 2025-07-31 PROCEDURE — 84403 ASSAY OF TOTAL TESTOSTERONE: CPT

## 2025-07-31 PROCEDURE — 82533 TOTAL CORTISOL: CPT

## 2025-08-01 LAB — DHEA-S SERPL-MCNC: 138 UG/DL (ref 41.2–243.7)

## 2025-08-05 DIAGNOSIS — R63.5 WEIGHT GAIN: Primary | ICD-10-CM
